# Patient Record
Sex: MALE | Race: WHITE | Employment: OTHER | URBAN - NONMETROPOLITAN AREA
[De-identification: names, ages, dates, MRNs, and addresses within clinical notes are randomized per-mention and may not be internally consistent; named-entity substitution may affect disease eponyms.]

---

## 2017-01-09 ENCOUNTER — AMBULATORY - GICH (OUTPATIENT)
Dept: SCHEDULING | Facility: OTHER | Age: 72
End: 2017-01-09

## 2017-01-09 ENCOUNTER — TRANSFERRED RECORDS (OUTPATIENT)
Dept: HEALTH INFORMATION MANAGEMENT | Facility: OTHER | Age: 72
End: 2017-01-09

## 2017-02-08 ENCOUNTER — COMMUNICATION - GICH (OUTPATIENT)
Dept: INTERNAL MEDICINE | Facility: OTHER | Age: 72
End: 2017-02-08

## 2017-02-08 DIAGNOSIS — R10.13 EPIGASTRIC PAIN: ICD-10-CM

## 2017-05-30 ENCOUNTER — COMMUNICATION - GICH (OUTPATIENT)
Dept: INTERNAL MEDICINE | Facility: OTHER | Age: 72
End: 2017-05-30

## 2017-05-30 DIAGNOSIS — R10.13 EPIGASTRIC PAIN: ICD-10-CM

## 2017-07-12 ENCOUNTER — OFFICE VISIT - GICH (OUTPATIENT)
Dept: INTERNAL MEDICINE | Facility: OTHER | Age: 72
End: 2017-07-12

## 2017-07-12 ENCOUNTER — HISTORY (OUTPATIENT)
Dept: INTERNAL MEDICINE | Facility: OTHER | Age: 72
End: 2017-07-12

## 2017-07-12 DIAGNOSIS — R73.09 OTHER ABNORMAL GLUCOSE: ICD-10-CM

## 2017-07-12 DIAGNOSIS — Z23 ENCOUNTER FOR IMMUNIZATION: ICD-10-CM

## 2017-07-12 DIAGNOSIS — E78.2 MIXED HYPERLIPIDEMIA: ICD-10-CM

## 2017-07-12 DIAGNOSIS — R73.03 PREDIABETES: ICD-10-CM

## 2017-07-12 DIAGNOSIS — Z86.59 PERSONAL HISTORY OF OTHER MENTAL AND BEHAVIORAL DISORDERS: ICD-10-CM

## 2017-07-12 LAB
CHOL/HDL RATIO - HISTORICAL: 3.72
CHOLESTEROL TOTAL: 186 MG/DL
ESTIMATED AVERAGE GLUCOSE: 105 MG/DL
HDLC SERPL-MCNC: 50 MG/DL (ref 23–92)
HEMOGLOBIN A1C MONITORING (POCT) - HISTORICAL: 5.3 % (ref 4–6.2)
LDLC SERPL CALC-MCNC: 117 MG/DL
NON-HDL CHOLESTEROL - HISTORICAL: 136 MG/DL
PATIENT STATUS - HISTORICAL: ABNORMAL
TRIGL SERPL-MCNC: 93 MG/DL

## 2017-07-12 ASSESSMENT — PATIENT HEALTH QUESTIONNAIRE - PHQ9: SUM OF ALL RESPONSES TO PHQ QUESTIONS 1-9: 0

## 2017-07-12 ASSESSMENT — ANXIETY QUESTIONNAIRES
3. WORRYING TOO MUCH ABOUT DIFFERENT THINGS: NOT AT ALL
GAD7 TOTAL SCORE: 0
6. BECOMING EASILY ANNOYED OR IRRITABLE: NOT AT ALL
2. NOT BEING ABLE TO STOP OR CONTROL WORRYING: NOT AT ALL
5. BEING SO RESTLESS THAT IT IS HARD TO SIT STILL: NOT AT ALL
7. FEELING AFRAID AS IF SOMETHING AWFUL MIGHT HAPPEN: NOT AT ALL
4. TROUBLE RELAXING: NOT AT ALL
1. FEELING NERVOUS, ANXIOUS, OR ON EDGE: NOT AT ALL

## 2017-11-27 ENCOUNTER — COMMUNICATION - GICH (OUTPATIENT)
Dept: INTERNAL MEDICINE | Facility: OTHER | Age: 72
End: 2017-11-27

## 2017-11-27 DIAGNOSIS — F41.9 ANXIETY DISORDER: ICD-10-CM

## 2017-12-28 NOTE — TELEPHONE ENCOUNTER
Patient Information     Patient Name MRN Sex Lauro Vázquez [id number] Male 1945      Telephone Encounter by Opal Rider RN at 2017  4:16 PM     Author:  Opal Rider RN Service:  (none) Author Type:  NURS- Registered Nurse     Filed:  2017  4:18 PM Encounter Date:  2017 Status:  Signed     :  Opal Rider RN (NURS- Registered Nurse)            Medication filled 2017 #90 R3  Unable to complete prescription refill per RN Medication Refill Policy.................... Opal Rider RN ....................  2017   4:17 PM

## 2017-12-28 NOTE — ADDENDUM NOTE
Patient Information     Patient Name MRN Sex Lauro Vázquez [id number] Male 1945      Addendum Note by Emmanuelle Austin at 2017  1:57 PM     Author:  Emmanuelle Austin Service:  (none) Author Type:  (none)     Filed:  2017  1:57 PM Encounter Date:  2017 Status:  Signed     :  Emmanuelle Austin       Addended by: EMMANUELLE AUSTIN on: 2017 01:57 PM        Modules accepted: Orders

## 2017-12-28 NOTE — PROGRESS NOTES
Patient Information     Patient Name MRN Sex Lauro Vázquez [id number] Male 1945      Progress Notes by Marco A Hampton MD at 2017  9:20 AM     Author:  Marco A Hampton MD Service:  (none) Author Type:  Physician     Filed:  2017 12:47 PM Encounter Date:  2017 Status:  Signed     :  Marco A Hampton MD (Physician)            Nursing Notes:   Emmanuelle Austin  2017  9:39 AM  Signed  Patient presents to the clinic for medication management.      Emmanuelle Austin LPN        2017 9:18 AM    Lauro Pritchard presents to clinic today for:   Chief Complaint    Patient presents with      Medication Management     HPI: Mr. Pritchard is a 72 y.o. male who presents today for evaluation of above.     (R73.03) Prediabetes - NEW Dx - 2015 - A1c 5.8%  (primary encounter diagnosis)  (E78.2) Mixed hyperlipidemia  (Z86.59) History of anxiety  (Z23) Need for pneumococcal vaccination  (R73.09) Elevated random blood glucose level     Prediabetes, has been really watching his diet.  He cut out a lot of sugared sweets and candies.  He like his labs rechecked today.  + Took metformin for a while, has now stopped. Last A1c was in high-normal.    -Labs today show his prediabetes has resolved.  HEMOGLOBIN A1C MONITORING (POCT) (%)    Date Value   2017 5.3     Hyperlipidemia, tolerating pravastatin 80 mg daily very well.  Denies having any significant myalgias issues.  - Needs refills today.  Last Lipids:  Chol: 2017 186   93  HDL: 2017 50   LDL: 2017 117    History of anxiety, very well controlled with low-dose Zoloft.  He was on 50 mg daily for quite a while but dropped down to 25 mg daily.  This is been managing his symptoms well.  He would like a refill today.    Pneumococcal vaccination due.    Heartburn issues are doing well. No longer using pepcid, rarely using omeprazole.     Mr. Pritchard's Body mass index is 27.12 kg/(m^2). This is out of the normal range for a 72 y.o.  Normal range for ages 18+ is between 18.5 and 24.9. To lose weight we reviewed risks and benefits of appropriate options such as diet, exercise, and medications. Patient's strategy will be  self-directed nutrition plan and self-directed exercise program   BP Readings from Last 1 Encounters:07/12/17 : 134/82  Mr. Sahni blood pressure is out of the normal range for adults. Per JNC-8 guidelines normal adult blood pressure is < 120/80, pre-hypertensive is between 120/80 and 139/89, and hypertension is 140/90 or greater. Risks of hypertension were discussed. Patient's strategy will be weight loss, increased activity and reduced salt intake    Functional Capacity: > 4 METS.   Reports that he can climb a flight of stairs without any chest pain/heaviness or shortness of breath.   Patient reports no current symptoms of fevers, chills, nausea/vomiting.   No cough. No shortness of breath.   No change in bowel/bladder habits. No melena, hematochezia. No Hematuria.   No rashes. No palpitations.  No orthopnea/paroxysmal nocturnal dyspnea   No vision or hearing issues.   No significant mood issues -- hx of severe anxiety - resolved with low-dose zoloft.   No bruising.     JOSE LUIS:  JOSE LUIS-7 ANXIETY SCREENING 7/7/2016 7/12/2017   JOSE LUIS date (doc flow) 7/7/2016 7/12/2017   Nervous, anxious 1 0   Cannot stop worrying 0 0   Worry about different things 0 0   Cannot relax 0 0   Feeling restless 0 0   Easily annoyed/irritated 0 0   Afraid of awful event 0 0   Score 1 0   Severity none none       PHQ9:  PHQ Depression Screening 12/21/2016 7/12/2017   Date of PHQ exam (doc flow) 12/21/2016 7/12/2017   1. Lack of interest/pleasure 0 - Not at all 0 - Not at all   2. Feeling down/depressed 0 - Not at all 0 - Not at all   PHQ-2 TOTAL SCORE 0 0   3. Trouble sleeping 0 - Not at all 0 - Not at all   4. Decreased energy 0 - Not at all 0 - Not at all   5. Appetite change 0 - Not at all 0 - Not at all   6. Feelings of failure 0 - Not at all 0 - Not at all    7. Trouble concentrating 0 - Not at all 0 - Not at all   8. Activity level 0 - Not at all 0 - Not at all   9. Hurting yourself 0 - Not at all 0 - Not at all   PHQ-9 TOTAL SCORE 0 0   PHQ-9 Severity Level none none   Functional Impairment not difficult at all not difficult at all        I have personally reviewed the past medical history, past surgical history, medications, allergies, family and social history as listed below, on 7/12/2017.    Patient Active Problem List      Diagnosis Date Noted     History of prediabetes 07/12/2017     Complete tear of right rotator cuff 12/21/2016     Arthritis of right acromioclavicular joint 12/21/2016     Nocturia 07/07/2016     Special screening for malignant neoplasms, colon 09/09/2015     History of colon polyps 04/22/2015     Mixed hyperlipidemia 09/17/2014     History of tobacco use 09/11/2014     Health care maintenance 09/11/2014     Dyspepsia 07/10/2013     History of anxiety 07/10/2013     Past Medical History:     Diagnosis  Date     Anxiety     lifetime      Past Surgical History:      Procedure  Laterality Date     COLONOSCOPY  2010    follow up due 2015 -- s/p polyp removal --> next in 2018       KNEE ARTHROSCOPY Right 2015     DE COLONOSCOPY REMOVE JULIO POLYP LESN SNARE  9/10/2015            Current Outpatient Prescriptions       Medication  Sig Dispense Refill     omeprazole (PRILOSEC) 20 mg Delayed-Release capsule TAKE 1 CAPSULE BY MOUTH ONCE DAILY. TAKE 30 TO 60 MINUTES PRIOR TO 1ST MEAL OF THE DAY. 90 capsule 1     pravastatin (PRAVACHOL) 80 mg tablet Take 1 tablet by mouth at bedtime. For Cholesterol and Heart Attack Risk Reduction 90 tablet 3     sertraline (ZOLOFT) 25 mg tablet Take 1 tablet by mouth once daily. 90 tablet 3     No Known Allergies  Family History       Problem   Relation Age of Onset     Osteoporosis  Mother      Genetic        Mother - Diabetes.  No FHx of CVA, No early family HX of CAD, no known cancers       Family Status     Relation   Status     Father  at age 91    heart failure      Mother  at age 88    heart failure      Sister Alive     Sister Alive     Brother Alive     Brother Alive     Brother Alive     Sister Alive    6 sibs healthy      Social History     Social History        Marital status:       Spouse name: Pamela     Number of children:  4     Years of education:  N/A     Occupational History               retired      Social History Main Topics         Smoking status:  Former Smoker      Packs/day: 0.50      Years: 20.00      Types: Cigarettes      Quit date: 7/10/1985      Smokeless tobacco:  Never Used      Alcohol use  7.0 oz/week     14 Cans of beer per week      Drug use:  No      Sexual activity:  Not on file      Other Topics   Concern      Service  Yes     [employer] x[employer] years - served on a [employer]      Seat Belt  Yes     Social History Narrative     , lives with wife, retired .     Pertinent ROS was performed and was negative as noted in HPI above.     EXAM:   Vitals:     17 0919   BP: 134/82   Pulse: 60   Weight: 90.7 kg (200 lb)   Height: 1.829 m (6')     BP Readings from Last 3 Encounters:    17 134/82   16 136/76   16 138/74     Wt Readings from Last 3 Encounters:    17 90.7 kg (200 lb)   16 93.2 kg (205 lb 6 oz)   16 92.8 kg (204 lb 8 oz)     Estimated body mass index is 27.12 kg/(m^2) as calculated from the following:    Height as of this encounter: 1.829 m (6').    Weight as of this encounter: 90.7 kg (200 lb).     EXAM:  Constitutional: Pleasant, alert, appropriate appearance for age. No acute distress  ENT: Normocephalic, Atraumatic, Thyroid without nodules or tenderness  Lymphatic Exam: Non-palpable nodes in neck, clavicular regions  Pulmonary: Lungs are clear to auscultation bilaterally, without wheezes or crackles  Cardiovascular Exam: regular rate and rhythm, brisk carotid upstroke without bruits, no pedal edema, no  murmur, click, rub or gallop appreciated  Gastrointestinal Exam: Soft, non-tender, non-distended, positive bowel sounds  Integument: No abnormal rashes, sores, or ulcerations noted  Neurologic Exam: CN 3-12 grossly intact   Musculoskeletal Exam: Moves lower extremities symmetrically, No focal weakness, some mild limited right shoulder ROM.   Gait and station appear grossly normal  Psychiatric Exam: Awake and Alert, Affect and mood appropriate  Speech is fluent, Thought process is normal    INVESTIGATIONS:  Results for orders placed or performed in visit on 07/12/17      LIPID PANEL      Result  Value Ref Range    CHOLESTEROL,TOTAL 186 <200 mg/dL    TRIGLYCERIDES 93 <150 mg/dL    HDL CHOLESTEROL 50 23 - 92 mg/dL    NON-HDL CHOLESTEROL 136 <145 mg/dl    CHOL/HDL RATIO            3.72 <4.50                    LDL CHOLESTEROL 117 (H) <100 mg/dL    PATIENT STATUS            FASTING                   Hgb A1c      Result  Value Ref Range    HEMOGLOBIN A1C MONITORING (POCT) 5.3 4.0 - 6.2 %    ESTIMATED AVERAGE GLUCOSE  105 mg/dL       ASSESSMENT AND PLAN:  Lauro was seen today for medication management.    Diagnoses and all orders for this visit:    Prediabetes - NEW Dx - 9/4/2015 - A1c 5.8%  -     pravastatin (PRAVACHOL) 80 mg tablet; Take 1 tablet by mouth at bedtime. For Cholesterol and Heart Attack Risk Reduction    Mixed hyperlipidemia  -     pravastatin (PRAVACHOL) 80 mg tablet; Take 1 tablet by mouth at bedtime. For Cholesterol and Heart Attack Risk Reduction  -     LIPID PANEL; Future  -     LIPID PANEL    History of anxiety  -     sertraline (ZOLOFT) 25 mg tablet; Take 1 tablet by mouth once daily.    Need for pneumococcal vaccination  -     OMNI PNEUMOCOCCAL VACCINE 23-VALENT => 3 YO IM  -     DE ADMIN PNEUMOCOCCAL VACC (MEDICARE)    Elevated random blood glucose level  -     Hgb A1c; Future  -     Hgb A1c      lab results and schedule of future lab studies reviewed with patient, reviewed diet, exercise and  weight control, recommended sodium restriction, cardiovascular risk and specific lipid/LDL goals reviewed    -- Expected clinical course discussed   -- Medications and their side effects discussed    The 10-year ASCVD risk score ([place] DC Jr, et al., 2013) is: 21.5%    Values used to calculate the score:      Age: 72 years      Sex: Male      Is Non- : No      Diabetic: No      Tobacco smoker: No      Systolic Blood Pressure: 134 mmHg      Is BP treated: No      HDL Cholesterol: 50 mg/dL      Total Cholesterol: 186 mg/dL    Lauro is also recommended to eat a heart-healthy diet, do regular aerobic exercises, maintain a desirable body weight, and avoid tobacco products. These recommendations are from the American Heart Association (AHA) which stresses the importance of lifestyle changes to lower cardiovascular disease risk.     Return in about 1 year (around 7/12/2018) for -- annual follow-up.    Patient Instructions     Immunization History     Administered  Date(s) Administered     Herpes-zoster Vaccine 01/20/2016     Influenza, IIV3 (Age >=3 years) 09/23/2014     Pneumococcal conj 13-Valent (Prevnar 13) 01/19/2016     Tdap 09/22/2014        Pneumococcal PCV 23 shot today.     Pneumococcal Pneumonia vaccines (PCV 13 and PCV 23)     Pneumococcal Conjugate 13 - Valent Vaccine (One time only).     Pneumococcal 23 - Valent Vaccine -- Two doses (One before age 65 and One After)    -- repeat every 5 years in certain patient populations.     PCV 13 should be given prior to PCV 23 -- THEN -- In eight weeks or more, PCV 23 can be given.   If the patient has already received PCV 23, they should not receive PCV 13 for one year.      Medications refilled.    Recheck cholesterol level and hemoglobin A1c today.    Return in approximately 1 year, or sooner as needed for follow-up with Dr. Hampton.  -- annual follow-up    Clinic : [phone]  Appointment line: [phone]      Marco A Hampton,  MD

## 2017-12-29 NOTE — PATIENT INSTRUCTIONS
Patient Information     Patient Name MRN Sex Lauro Vázquez [id number] Male 1945      Patient Instructions by Marco A Hampton MD at 2017  9:20 AM     Author:  Marco A Hampton MD  Service:  (none) Author Type:  Physician     Filed:  2017  9:44 AM  Encounter Date:  2017 Status:  Addendum     :  Marco A Hampton MD (Physician)        Related Notes: Original Note by Marco A Hampton MD (Physician) filed at 2017  9:43 AM            Immunization History     Administered  Date(s) Administered     Herpes-zoster Vaccine 2016     Influenza, IIV3 (Age >=3 years) 2014     Pneumococcal conj 13-Valent (Prevnar 13) 2016     Tdap 2014        Pneumococcal PCV 23 shot today.     Pneumococcal Pneumonia vaccines (PCV 13 and PCV 23)     Pneumococcal Conjugate 13 - Valent Vaccine (One time only).     Pneumococcal 23 - Valent Vaccine -- Two doses (One before age 65 and One After)    -- repeat every 5 years in certain patient populations.     PCV 13 should be given prior to PCV 23 -- THEN -- In eight weeks or more, PCV 23 can be given.   If the patient has already received PCV 23, they should not receive PCV 13 for one year.      Medications refilled.    Recheck cholesterol level and hemoglobin A1c today.    Return in approximately 1 year, or sooner as needed for follow-up with Dr. Hampton.  -- annual follow-up    Clinic : [phone]  Appointment line: [phone]

## 2017-12-30 NOTE — NURSING NOTE
Patient Information     Patient Name MRN Lauro Linda [id number] Male 1945      Nursing Note by Emmanuelle Austin at 2017  9:20 AM     Author:  Emmanuelle Austin Service:  (none) Author Type:  (none)     Filed:  2017  9:39 AM Encounter Date:  2017 Status:  Signed     :  Emmanuelle Austin            Patient presents to the clinic for medication management.      Emmanuelle Austin LPN        2017 9:18 AM

## 2018-01-03 NOTE — TELEPHONE ENCOUNTER
Patient Information     Patient Name MRN Sex Lauro Vázquez [id number] Male 1945      Telephone Encounter by Annette Arthur RN at 2017  1:55 PM     Author:  Annette Arthur RN Service:  (none) Author Type:  NURS- Registered Nurse     Filed:  2017  1:57 PM Encounter Date:  2017 Status:  Signed     :  Annette Arthur RN (NURS- Registered Nurse)            Proton Pump Inhibitors    Office visit in the past 12 months or per provider note.    Last visit with MICHELE PEREYRA was on: 2016 in [facility]  Next visit with MICHELE PEREYRA is on: No future appointment listed with this provider  Next visit with Internal Medicine is on: No future appointment listed in this department    Max refill for 12 months from last office visit or per provider note.  Prescription refilled per RN Medication Refill Policy.................... ANNETTE ARTHUR RN ....................  2017   1:57 PM

## 2018-01-05 NOTE — TELEPHONE ENCOUNTER
Patient Information     Patient Name MRN Sex Lauro Vázquez [id number] Male 1945      Telephone Encounter by Annette Arthur RN at 2017  9:03 AM     Author:  Annette Arthur RN Service:  (none) Author Type:  NURS- Registered Nurse     Filed:  2017  9:04 AM Encounter Date:  2017 Status:  Signed     :  Annette Arthur RN (NURS- Registered Nurse)            Proton Pump Inhibitors    Office visit in the past 12 months or per provider note.    Last visit with MICHELE PEREYRA was on: 2016 in [facility]  Next visit with MICHELE PEREYRA is on: 2017 in [facility]  Next visit with Internal Medicine is on: 2017 in [facility]    Max refill for 12 months from last office visit or per provider note.  Prescription refilled per RN Medication Refill Policy.................... ANNETTE ARTHUR RN ....................  2017   9:04 AM

## 2018-01-26 VITALS
DIASTOLIC BLOOD PRESSURE: 82 MMHG | HEIGHT: 72 IN | SYSTOLIC BLOOD PRESSURE: 134 MMHG | WEIGHT: 200 LBS | HEART RATE: 60 BPM | BODY MASS INDEX: 27.09 KG/M2

## 2018-02-02 ASSESSMENT — ANXIETY QUESTIONNAIRES: GAD7 TOTAL SCORE: 0

## 2018-02-02 ASSESSMENT — PATIENT HEALTH QUESTIONNAIRE - PHQ9: SUM OF ALL RESPONSES TO PHQ QUESTIONS 1-9: 0

## 2018-02-19 DIAGNOSIS — R10.13 DYSPEPSIA: Primary | ICD-10-CM

## 2018-02-21 ENCOUNTER — DOCUMENTATION ONLY (OUTPATIENT)
Dept: FAMILY MEDICINE | Facility: OTHER | Age: 73
End: 2018-02-21

## 2018-02-21 PROBLEM — Z87.898 HISTORY OF PREDIABETES: Status: ACTIVE | Noted: 2017-07-12

## 2018-02-21 RX ORDER — PRAVASTATIN SODIUM 80 MG/1
80 TABLET ORAL AT BEDTIME
COMMUNITY
Start: 2017-07-12 | End: 2018-08-15

## 2018-02-21 RX ORDER — SERTRALINE HYDROCHLORIDE 25 MG/1
25 TABLET, FILM COATED ORAL DAILY
COMMUNITY
Start: 2017-07-12 | End: 2018-08-15

## 2018-02-22 DIAGNOSIS — R12 HEARTBURN: Primary | ICD-10-CM

## 2018-02-22 NOTE — TELEPHONE ENCOUNTER
Prescription approved per [facility] Refill Protocol.  LOV: 7/12/17  Bre León RN on 2/22/2018 at 2:21 PM

## 2018-02-28 NOTE — TELEPHONE ENCOUNTER
Refill request for Omeprazole 20 mg from [facility].    Jovan Sandhu, AGNIESZKA 02/28/18 8:25 AM

## 2018-03-08 ENCOUNTER — DOCUMENTATION ONLY (OUTPATIENT)
Dept: FAMILY MEDICINE | Facility: OTHER | Age: 73
End: 2018-03-08

## 2018-03-25 ENCOUNTER — HEALTH MAINTENANCE LETTER (OUTPATIENT)
Age: 73
End: 2018-03-25

## 2018-07-23 NOTE — PROGRESS NOTES
Patient Information     Patient Name  Lauro Pritchard MRN  [id number] Sex  Male   1945      Letter by Marco A Hampton MD at      Author:  Marco A Hampton MD Service:  (none) Author Type:  (none)    Filed:   Encounter Date:  2017 Status:  (Other)           Lauro Pritchard  [address] [facility] [address]          2017    Dear Mr. Pritchard:    Following are the tests completed during your last clinic visit.  The results of these tests are included below.      Cholesterol levels look much better.  Recommend continuing pravastatin at the current dose.    Your prediabetes has totally resolved.  Hemoglobin A1c is now normal.    Keep working to try maintain healthy weight, healthy diet and regular exercise.     Results for orders placed or performed in visit on 17      LIPID PANEL      Result  Value Ref Range    CHOLESTEROL,TOTAL 186 <200 mg/dL    TRIGLYCERIDES 93 <150 mg/dL    HDL CHOLESTEROL 50 23 - 92 mg/dL    NON-HDL CHOLESTEROL 136 <145 mg/dl    CHOL/HDL RATIO            3.72 <4.50                    LDL CHOLESTEROL 117 (H) <100 mg/dL    PATIENT STATUS            FASTING                   Hgb A1c      Result  Value Ref Range    HEMOGLOBIN A1C MONITORING (POCT) 5.3 4.0 - 6.2 %    ESTIMATED AVERAGE GLUCOSE  105 mg/dL         If you have any further questions or problems contact my office at  [phone]   --- or send [employer] message --- otherwise schedule an appointment.    Clinic : [phone]  Appointment line: [phone]     Thank you,    Marco A Hampton MD    Internal Medicine  [facility]     Reviewed and electronically signed by provider.

## 2018-08-15 ENCOUNTER — OFFICE VISIT (OUTPATIENT)
Dept: INTERNAL MEDICINE | Facility: OTHER | Age: 73
End: 2018-08-15
Attending: INTERNAL MEDICINE
Payer: MEDICARE

## 2018-08-15 VITALS
HEART RATE: 72 BPM | WEIGHT: 203.13 LBS | DIASTOLIC BLOOD PRESSURE: 72 MMHG | BODY MASS INDEX: 27.55 KG/M2 | SYSTOLIC BLOOD PRESSURE: 118 MMHG

## 2018-08-15 DIAGNOSIS — E78.2 MIXED HYPERLIPIDEMIA: Primary | ICD-10-CM

## 2018-08-15 DIAGNOSIS — R12 HEARTBURN: ICD-10-CM

## 2018-08-15 DIAGNOSIS — G89.29 CHRONIC NECK PAIN: ICD-10-CM

## 2018-08-15 DIAGNOSIS — M54.2 CHRONIC NECK PAIN: ICD-10-CM

## 2018-08-15 DIAGNOSIS — Z12.5 SPECIAL SCREENING FOR MALIGNANT NEOPLASM OF PROSTATE: ICD-10-CM

## 2018-08-15 DIAGNOSIS — F41.9 ANXIETY: ICD-10-CM

## 2018-08-15 PROCEDURE — 99214 OFFICE O/P EST MOD 30 MIN: CPT | Performed by: INTERNAL MEDICINE

## 2018-08-15 PROCEDURE — G0463 HOSPITAL OUTPT CLINIC VISIT: HCPCS

## 2018-08-15 RX ORDER — PRAVASTATIN SODIUM 80 MG/1
80 TABLET ORAL AT BEDTIME
Qty: 90 TABLET | Refills: 3 | Status: SHIPPED | OUTPATIENT
Start: 2018-08-15 | End: 2019-05-31

## 2018-08-15 RX ORDER — SERTRALINE HYDROCHLORIDE 25 MG/1
25 TABLET, FILM COATED ORAL DAILY
Qty: 90 TABLET | Refills: 3 | Status: SHIPPED | OUTPATIENT
Start: 2018-08-15 | End: 2019-05-31

## 2018-08-15 ASSESSMENT — ENCOUNTER SYMPTOMS
CONFUSION: 0
NECK PAIN: 1
DIARRHEA: 0
SHORTNESS OF BREATH: 0
VOMITING: 0
BRUISES/BLEEDS EASILY: 0
CHILLS: 0
NAUSEA: 0
FEVER: 0
ABDOMINAL PAIN: 0
DYSURIA: 0
LIGHT-HEADEDNESS: 0
EYE PAIN: 0
FATIGUE: 0
ARTHRALGIAS: 0
WHEEZING: 0
AGITATION: 0
PALPITATIONS: 0
MYALGIAS: 0
COUGH: 0
DIZZINESS: 0
HEMATURIA: 0

## 2018-08-15 ASSESSMENT — ANXIETY QUESTIONNAIRES
5. BEING SO RESTLESS THAT IT IS HARD TO SIT STILL: NOT AT ALL
1. FEELING NERVOUS, ANXIOUS, OR ON EDGE: NOT AT ALL
7. FEELING AFRAID AS IF SOMETHING AWFUL MIGHT HAPPEN: NOT AT ALL
2. NOT BEING ABLE TO STOP OR CONTROL WORRYING: NOT AT ALL
GAD7 TOTAL SCORE: 0
3. WORRYING TOO MUCH ABOUT DIFFERENT THINGS: NOT AT ALL
6. BECOMING EASILY ANNOYED OR IRRITABLE: NOT AT ALL
IF YOU CHECKED OFF ANY PROBLEMS ON THIS QUESTIONNAIRE, HOW DIFFICULT HAVE THESE PROBLEMS MADE IT FOR YOU TO DO YOUR WORK, TAKE CARE OF THINGS AT HOME, OR GET ALONG WITH OTHER PEOPLE: NOT DIFFICULT AT ALL

## 2018-08-15 ASSESSMENT — PAIN SCALES - GENERAL: PAINLEVEL: MODERATE PAIN (5)

## 2018-08-15 ASSESSMENT — PATIENT HEALTH QUESTIONNAIRE - PHQ9: 5. POOR APPETITE OR OVEREATING: NOT AT ALL

## 2018-08-15 NOTE — NURSING NOTE
Patient presents to the clinic for medication management.    Chief Complaint   Patient presents with     Recheck Medication       Initial There were no vitals taken for this visit. Estimated body mass index is 27.12 kg/(m^2) as calculated from the following:    Height as of 7/12/17: 6' (1.829 m).    Weight as of 7/12/17: 200 lb (90.7 kg).  Medication Reconciliation: complete    Emmanuelle Austin LPN

## 2018-08-15 NOTE — MR AVS SNAPSHOT
After Visit Summary   8/15/2018    Lauro Pritchard    MRN: [id number]           Patient Information     Date Of Birth          1945        Visit Information        Provider Department      8/15/2018 8:20 AM Marco A Hampton MD [facility]        Today's Diagnoses     Mixed hyperlipidemia    -  1    Anxiety        Heartburn        Special screening for malignant neoplasm of prostate        Chronic neck pain          Care Instructions    1. Mixed hyperlipidemia  - pravastatin (PRAVACHOL) 80 MG tablet; Take 1 tablet (80 mg) by mouth At Bedtime  Dispense: 90 tablet; Refill: 3  - Lipid Panel; Future    2. Anxiety  - sertraline (ZOLOFT) 25 MG tablet; Take 1 tablet (25 mg) by mouth daily  Dispense: 90 tablet; Refill: 3    3. Heartburn  - omeprazole (PRILOSEC) 20 MG CR capsule; Take 1 capsule (20 mg) by mouth daily Take 30 to 60 minutes prior to 1st meal of the day.  Dispense: 90 capsule; Refill: 3    4. Special screening for malignant neoplasm of prostate  - PSA Screen GH; Future    5. Chronic neck pain  - XR Cervical Spine Flex/Ext 2/3 Views; Future    bilateral upper cervical pain, just lateral from midline bilaterally ? facet arthropathy.   --Depending on x-ray results, could consider facet injection.    Facet Joint Injection  Back or neck pain may be caused by a problem with your facet joints. If so, a facet joint injection may help. With this treatment, medicine is injected into certain facet joints. The injection can help your doctor find problem joints. It may also relieve your pain.    What is a facet joint?  Bones called vertebrae make up your spine. Each vertebra has facets (flat surfaces) that touch where the vertebrae fit together. These form a structure called a facet joint on each side of the vertebrae.  What is a facet joint injection?  One or more facet joints in your back or neck can become inflamed (swollen and irritated). This may cause pain. During a facet joint  injection, medicine is injected into the inflamed joints. This treatment may help reduce inflammation and relieve pain. Pain relief may last for weeks to months or longer. If the pain returns, you may need a repeat injection.    Getting ready  To get ready for your treatment, do the following:    At least a week before treatment, tell your healthcare provider what medicines you take. This includes aspirin. Ask whether you should stop taking any of them before treatment.    Tell your provider if you are pregnant or allergic to any medicines.    Follow any directions you are given for not eating or drinking before the treatment.    If asked, bring X-rays, MRIs, or other tests with you on the day of your treatment.  Date Last Reviewed: 9/21/2015 2000-2017 [employer]. [address] [place] [address], [place] [address]. All rights reserved. This information is not intended as a substitute for professional medical care. Always follow your healthcare professional's instructions.      Return in approximately 1 year, or sooner as needed for follow-up with Dr. Hampton.  - Annual follow-up    Clinic : [phone]  Appointment line: [phone]            Follow-ups after your visit        Follow-up notes from your care team     Return in about 1 year (around 8/15/2019) for - Annual follow-up.      Future tests that were ordered for you today     Open Future Orders        Priority Expected Expires Ordered    XR Cervical Spine Flex/Ext 2/3 Views Routine 8/15/2018 8/15/2019 8/15/2018    Lipid Panel Routine  8/15/2019 8/15/2018    PSA Screen GH Routine  8/15/2019 8/15/2018            Who to contact     If you have questions or need follow up information about today's clinic visit or your schedule please contact [facility] directly at [phone].  Normal or non-critical lab and imaging results will be communicated to you by MyChart, letter or phone within 4 business days after the clinic  has received the results. If you do not hear from us within 7 days, please contact the clinic through [employer] or phone. If you have a critical or abnormal lab result, we will notify you by phone as soon as possible.  Submit refill requests through [employer] or call your pharmacy and they will forward the refill request to us. Please allow 3 business days for your refill to be completed.          Additional Information About Your Visit        [employer] Information     [employer] gives you secure access to your electronic health record. If you see a primary care provider, you can also send messages to your care team and make appointments. If you have questions, please call your primary care clinic.  If you do not have a primary care provider, please call [phone] and they will assist you.        Care EveryWhere ID     This is your Care EveryWhere ID. This could be used by other organizations to access your [facility] medical records  [id number]        Your Vitals Were     Pulse BMI (Body Mass Index)                72 27.55 kg/m2           Blood Pressure from Last 3 Encounters:   08/15/18 118/72   07/12/17 134/82   12/21/16 136/76    Weight from Last 3 Encounters:   08/15/18 203 lb 2 oz (92.1 kg)   07/12/17 200 lb (90.7 kg)   12/21/16 205 lb 6 oz (93.2 kg)                 Where to get your medicines      These medications were sent to [id number] - [place], [place] - [address]  [address], [facility] [address]     Phone:  [phone]     omeprazole 20 MG CR capsule    pravastatin 80 MG tablet    sertraline 25 MG tablet          Primary Care Provider Office Phone # Fax #    Marco A Hampton [phone] [address]  [facility] [address]        Equal Access to Services     ORA JAY : Margarita Steward, anibal garcia, qastacia barone. So [facility] [phone].    ATENCIÓN: Si  jenny guerra, tiene a nava disposición servicios gratuitos de asistencia lingüística. Sal maier [phone].    We comply with applicable federal civil rights laws and [place] laws. We do not discriminate on the basis of race, color, national origin, age, disability, sex, sexual orientation, or gender identity.            Thank you!     Thank you for choosing [facility]  for your care. Our goal is always to provide you with excellent care. Hearing back from our patients is one way we can continue to improve our services. Please take a few minutes to complete the written survey that you may receive in the mail after your visit with us. Thank you!             Your Updated Medication List - Protect others around you: Learn how to safely use, store and throw away your medicines at www.disposemymeds.org.          This list is accurate as of 8/15/18  8:51 AM.  Always use your most recent med list.                   Brand Name Dispense Instructions for use Diagnosis    omeprazole 20 MG CR capsule    priLOSEC    90 capsule    Take 1 capsule (20 mg) by mouth daily Take 30 to 60 minutes prior to 1st meal of the day.    Heartburn       pravastatin 80 MG tablet    PRAVACHOL    90 tablet    Take 1 tablet (80 mg) by mouth At Bedtime    Mixed hyperlipidemia       sertraline 25 MG tablet    ZOLOFT    90 tablet    Take 1 tablet (25 mg) by mouth daily    Anxiety

## 2018-08-15 NOTE — PATIENT INSTRUCTIONS
1. Mixed hyperlipidemia  - pravastatin (PRAVACHOL) 80 MG tablet; Take 1 tablet (80 mg) by mouth At Bedtime  Dispense: 90 tablet; Refill: 3  - Lipid Panel; Future    2. Anxiety  - sertraline (ZOLOFT) 25 MG tablet; Take 1 tablet (25 mg) by mouth daily  Dispense: 90 tablet; Refill: 3    3. Heartburn  - omeprazole (PRILOSEC) 20 MG CR capsule; Take 1 capsule (20 mg) by mouth daily Take 30 to 60 minutes prior to 1st meal of the day.  Dispense: 90 capsule; Refill: 3    4. Special screening for malignant neoplasm of prostate  - PSA Screen GH; Future    5. Chronic neck pain  - XR Cervical Spine Flex/Ext 2/3 Views; Future    bilateral upper cervical pain, just lateral from midline bilaterally ? facet arthropathy.   --Depending on x-ray results, could consider facet injection.    Facet Joint Injection  Back or neck pain may be caused by a problem with your facet joints. If so, a facet joint injection may help. With this treatment, medicine is injected into certain facet joints. The injection can help your doctor find problem joints. It may also relieve your pain.    What is a facet joint?  Bones called vertebrae make up your spine. Each vertebra has facets (flat surfaces) that touch where the vertebrae fit together. These form a structure called a facet joint on each side of the vertebrae.  What is a facet joint injection?  One or more facet joints in your back or neck can become inflamed (swollen and irritated). This may cause pain. During a facet joint injection, medicine is injected into the inflamed joints. This treatment may help reduce inflammation and relieve pain. Pain relief may last for weeks to months or longer. If the pain returns, you may need a repeat injection.    Getting ready  To get ready for your treatment, do the following:    At least a week before treatment, tell your healthcare provider what medicines you take. This includes aspirin. Ask whether you should stop taking any of them before  treatment.    Tell your provider if you are pregnant or allergic to any medicines.    Follow any directions you are given for not eating or drinking before the treatment.    If asked, bring X-rays, MRIs, or other tests with you on the day of your treatment.  Date Last Reviewed: 9/21/2015 2000-2017 [employer] [address] [place] [address]. All rights reserved. This information is not intended as a substitute for professional medical care. Always follow your healthcare professional's instructions.      Return in approximately 1 year, or sooner as needed for follow-up with Dr. Hampton.  - Annual follow-up    Clinic : [phone]  Appointment line: [phone]

## 2018-08-15 NOTE — PROGRESS NOTES
Nursing Notes:   Emmanuelle Austin LPN  8/15/2018  8:37 AM  Signed  Patient presents to the clinic for medication management.    Chief Complaint   Patient presents with     Recheck Medication       Initial There were no vitals taken for this visit. Estimated body mass index is 27.12 kg/(m^2) as calculated from the following:    Height as of 7/12/17: 6' (1.829 m).    Weight as of 7/12/17: 200 lb (90.7 kg).  Medication Reconciliation: complete    Emmanuelle Austin LPN    Nursing note reviewed with patient.  Accurracy and completeness verified.   Mr. Pritchard is a 73 year old male who:  Patient presents with:  Recheck Medication    HPI     ICD-10-CM    1. Mixed hyperlipidemia E78.2 pravastatin (PRAVACHOL) 80 MG tablet     Lipid Panel   2. Anxiety F41.9 sertraline (ZOLOFT) 25 MG tablet   3. Heartburn R12 omeprazole (PRILOSEC) 20 MG CR capsule   4. Special screening for malignant neoplasm of prostate Z12.5 PSA Screen GH   5. Chronic neck pain M54.2 XR Cervical Spine Flex/Ext 2/3 Views    G89.29      Patient presents for yearly follow-up.  Tolerating pravastatin well.  Needs refills.  Check labs.    Anxiety, chronic, doing well with Zoloft.  Would like to continue with same dose.  Refills today.    Heartburn, much improved.  Now rarely uses omeprazole.  He needs refills today.  Reports was drinking beer regularly which he has now discontinued and his heartburn significantly reduced.    History of nocturia, most recently does not have any more of these issues.  He would like PSA screening today for prostate cancer.    Chronic neck pain, mostly upper neck.  Neck rotation and even sometimes flexion-extension causes some pain just lateral from midline in the upper neck.  Has had issues with this on and off for years.  Does not radiate down the arms at all.  No hand weakness or numbness or tingling.  Localized pain.  Pain is somewhat sharp with movement or activity.  Mild to moderate in severity.  Rarely needs any medication for  this.  Check neck x-rays today.  Consider facet joint injection if noted abnormal on x-ray.  Patient will let us know when symptoms worsen enough to proceed with any further investigations.    Functional Capacity: > 4 METS.   Reports that he can climb a flight of stairs without any chest pain/heaviness or shortness of breath.   No orthopnea/paroxysmal nocturnal dyspnea  Review of Systems   Constitutional: Negative for chills, fatigue and fever.   HENT: Negative for congestion and hearing loss.    Eyes: Negative for pain and visual disturbance.   Respiratory: Negative for cough, shortness of breath and wheezing.    Cardiovascular: Negative for chest pain and palpitations.   Gastrointestinal: Negative for abdominal pain, diarrhea, nausea and vomiting.   Endocrine: Negative for cold intolerance and heat intolerance.   Genitourinary: Negative for dysuria and hematuria.   Musculoskeletal: Positive for neck pain. Negative for arthralgias and myalgias.   Skin: Negative for pallor.   Allergic/Immunologic: Negative for immunocompromised state.   Neurological: Negative for dizziness and light-headedness.   Hematological: Does not bruise/bleed easily.   Psychiatric/Behavioral: Negative for agitation and confusion.      JOSE LUIS:   JOSE LUIS-7 SCORE 7/7/2016 7/12/2017 8/15/2018   Total Score 1 0 0     PHQ9:  PHQ-9 SCORE 12/21/2016 7/12/2017 8/15/2018   Total Score 0 0 0       I have personally reviewed the past medical history, past surgical history, medications, allergies, family and social history as listed below, on 8/15/2018.    No Known Allergies    Current Outpatient Prescriptions   Medication Sig Dispense Refill     omeprazole (PRILOSEC) 20 MG CR capsule Take 1 capsule (20 mg) by mouth daily Take 30 to 60 minutes prior to 1st meal of the day. 90 capsule 3     pravastatin (PRAVACHOL) 80 MG tablet Take 1 tablet (80 mg) by mouth At Bedtime 90 tablet 3     sertraline (ZOLOFT) 25 MG tablet Take 1 tablet (25 mg) by mouth daily 90 tablet  3     [DISCONTINUED] pravastatin (PRAVACHOL) 80 MG tablet Take 80 mg by mouth At Bedtime       [DISCONTINUED] sertraline (ZOLOFT) 25 MG tablet Take 25 mg by mouth daily          Patient Active Problem List    Diagnosis Date Noted     Chronic neck pain 08/15/2018     Priority: Medium     History of prediabetes 07/12/2017     Priority: Medium     Arthritis of right acromioclavicular joint 12/21/2016     Priority: Medium     Complete tear of right rotator cuff 12/21/2016     Priority: Medium     Special screening for malignant neoplasms, colon 09/09/2015     Priority: Medium     History of colon polyps 04/22/2015     Priority: Medium     Mixed hyperlipidemia 09/17/2014     Priority: Medium     Health care maintenance 09/11/2014     Priority: Medium     History of tobacco use 09/11/2014     Priority: Medium     History of anxiety 07/10/2013     Priority: Medium     Past Medical History:   Diagnosis Date     Anxiety disorder     lifetime     Past Surgical History:   Procedure Laterality Date     ARTHROSCOPY KNEE      2015     COLONOSCOPY      2010,follow up due 2015 -- s/p polyp removal --> next in 2018     OTHER SURGICAL HISTORY      9/10/2015,29771.0,DC COLONOSCOPY REMOVE JULIO POLYP LESN JANETTE     Social History     Social History     Marital status:      Spouse name: Pamela     Number of children: N/A     Years of education: N/A     Social History Main Topics     Smoking status: Former Smoker     Packs/day: 0.50     Years: 20.00     Types: Cigarettes     Quit date: 7/10/1985     Smokeless tobacco: Never Used     Alcohol use 7.0 oz/week     Drug use: None      Comment: Drug use: No     Sexual activity: Not Asked     Other Topics Concern     None     Social History Narrative    , lives with wife, retired .     Family History   Problem Relation Age of Onset     Osteoperosis Mother      Osteoporosis     Genetic Disorder Other      Genetic,Mother - Diabetes.  No FHx of CVA, No early family HX of CAD,  no known cancers       EXAM:   Vitals:    08/15/18 0830   BP: 118/72   BP Location: Right arm   Patient Position: Sitting   Cuff Size: Adult Regular   Pulse: 72   Weight: 203 lb 2 oz (92.1 kg)       Current Pain Score: Moderate Pain (5)     BP Readings from Last 3 Encounters:   08/15/18 118/72   07/12/17 134/82   12/21/16 136/76    Wt Readings from Last 3 Encounters:   08/15/18 203 lb 2 oz (92.1 kg)   07/12/17 200 lb (90.7 kg)   12/21/16 205 lb 6 oz (93.2 kg)      Estimated body mass index is 27.55 kg/(m^2) as calculated from the following:    Height as of 7/12/17: 6' (1.829 m).    Weight as of this encounter: 203 lb 2 oz (92.1 kg).     Physical Exam   Constitutional: He appears well-developed and well-nourished. No distress.   HENT:   Head: Normocephalic and atraumatic.   Eyes: Conjunctivae are normal. No scleral icterus.   Neck: No thyromegaly present.   Cardiovascular: Normal rate and regular rhythm.    No carotid Bruits   Pulmonary/Chest: Effort normal. No respiratory distress. He has no wheezes.   Abdominal: Soft. There is no tenderness.   Musculoskeletal: He exhibits no deformity.   Mild tenderness just lateral from midline in the upper cervical spine.  More focal tenderness to palpation over the facet joints.  Minimal paraspinal muscle spasticity.  No step-offs.  No spinous process tenderness.   Lymphadenopathy:     He has no cervical adenopathy.   Neurological: He is alert. No cranial nerve deficit.   Skin: Skin is warm and dry.   Psychiatric: He has a normal mood and affect.        INVESTIGATIONS:  Results for orders placed or performed in visit on 07/12/17   Lipid Profile   Result Value Ref Range    HDL Cholesterol 50 23 - 92 mg/dL    Cholesterol Total 186 <200 mg/dL    Non-HDL Cholesterol - Historical 136 <145 mg/dL    LDL Cholesterol Calculated 117 (H) <100 mg/dL    Triglycerides 93 <150 mg/dL    Cholesterol/HDL Ratio - Historical 3.72 <4.50    Patient Status - Historical FASTING    Hemoglobin A1c POCT    Result Value Ref Range    Hemoglobin A1C Monitoring - Historical 5.3 4.0 - 6.2 %    Estimated Average Glucose 105 mg/dL    Narrative         (<=6.9%)           Indicates good control       (7.0% to 7.9%)     Indicates fair control       (>=8.0%)           Indicates poor control     NOTE:  These thresholds are guidelines and            individual targets may vary.       ASSESSMENT AND PLAN:  Problem List Items Addressed This Visit        Nervous and Auditory    Chronic neck pain    Relevant Orders    XR Cervical Spine Flex/Ext 2/3 Views       Endocrine    Mixed hyperlipidemia - Primary    Relevant Medications    pravastatin (PRAVACHOL) 80 MG tablet    Other Relevant Orders    Lipid Panel      Other Visit Diagnoses     Anxiety        Relevant Medications    sertraline (ZOLOFT) 25 MG tablet    Heartburn        Relevant Medications    omeprazole (PRILOSEC) 20 MG CR capsule    Special screening for malignant neoplasm of prostate        Relevant Orders    PSA Screen GH        reviewed diet, exercise and weight control, recommended sodium restriction, cardiovascular risk and specific lipid/LDL goals reviewed  -- Expected clinical course discussed    -- Medications and their side effects discussed    The 10-year ASCVD risk score (Chris VILLA Jr, et al., 2013) is: 18.8%    Values used to calculate the score:      Age: 73 years      Sex: Male      Is Non- : No      Diabetic: No      Tobacco smoker: No      Systolic Blood Pressure: 118 mmHg      Is BP treated: No      HDL Cholesterol: 50 mg/dL      Total Cholesterol: 186 mg/dL    Patient Instructions   1. Mixed hyperlipidemia  - pravastatin (PRAVACHOL) 80 MG tablet; Take 1 tablet (80 mg) by mouth At Bedtime  Dispense: 90 tablet; Refill: 3  - Lipid Panel; Future    2. Anxiety  - sertraline (ZOLOFT) 25 MG tablet; Take 1 tablet (25 mg) by mouth daily  Dispense: 90 tablet; Refill: 3    3. Heartburn  - omeprazole (PRILOSEC) 20 MG CR capsule; Take 1 capsule (20  mg) by mouth daily Take 30 to 60 minutes prior to 1st meal of the day.  Dispense: 90 capsule; Refill: 3    4. Special screening for malignant neoplasm of prostate  - PSA Screen GH; Future    5. Chronic neck pain  - XR Cervical Spine Flex/Ext 2/3 Views; Future    bilateral upper cervical pain, just lateral from midline bilaterally ? facet arthropathy.   --Depending on x-ray results, could consider facet injection.    Facet Joint Injection  Back or neck pain may be caused by a problem with your facet joints. If so, a facet joint injection may help. With this treatment, medicine is injected into certain facet joints. The injection can help your doctor find problem joints. It may also relieve your pain.    What is a facet joint?  Bones called vertebrae make up your spine. Each vertebra has facets (flat surfaces) that touch where the vertebrae fit together. These form a structure called a facet joint on each side of the vertebrae.  What is a facet joint injection?  One or more facet joints in your back or neck can become inflamed (swollen and irritated). This may cause pain. During a facet joint injection, medicine is injected into the inflamed joints. This treatment may help reduce inflammation and relieve pain. Pain relief may last for weeks to months or longer. If the pain returns, you may need a repeat injection.    Getting ready  To get ready for your treatment, do the following:    At least a week before treatment, tell your healthcare provider what medicines you take. This includes aspirin. Ask whether you should stop taking any of them before treatment.    Tell your provider if you are pregnant or allergic to any medicines.    Follow any directions you are given for not eating or drinking before the treatment.    If asked, bring X-rays, MRIs, or other tests with you on the day of your treatment.  Date Last Reviewed: 9/21/2015 2000-2017 [employer]. [address] [place] [address], [place] [address]. All  rights reserved. This information is not intended as a substitute for professional medical care. Always follow your healthcare professional's instructions.      Return in approximately 1 year, or sooner as needed for follow-up with Dr. Hampton.  - Annual follow-up    Clinic : [phone]  Appointment line: [phone]      Marco A Hampton MD  Internal Medicine  [facility]

## 2018-08-16 ASSESSMENT — PATIENT HEALTH QUESTIONNAIRE - PHQ9: SUM OF ALL RESPONSES TO PHQ QUESTIONS 1-9: 0

## 2018-08-16 ASSESSMENT — ANXIETY QUESTIONNAIRES: GAD7 TOTAL SCORE: 0

## 2018-08-20 ENCOUNTER — HOSPITAL ENCOUNTER (OUTPATIENT)
Dept: GENERAL RADIOLOGY | Facility: OTHER | Age: 73
Discharge: HOME | End: 2018-08-20
Attending: INTERNAL MEDICINE | Admitting: INTERNAL MEDICINE
Payer: MEDICARE

## 2018-08-20 ENCOUNTER — MYC MEDICAL ADVICE (OUTPATIENT)
Dept: INTERNAL MEDICINE | Facility: OTHER | Age: 73
End: 2018-08-20

## 2018-08-20 DIAGNOSIS — M54.2 CHRONIC NECK PAIN: ICD-10-CM

## 2018-08-20 DIAGNOSIS — G89.29 CHRONIC NECK PAIN: ICD-10-CM

## 2018-08-20 PROCEDURE — 72040 X-RAY EXAM NECK SPINE 2-3 VW: CPT

## 2018-08-23 ENCOUNTER — MYC MEDICAL ADVICE (OUTPATIENT)
Dept: INTERNAL MEDICINE | Facility: OTHER | Age: 73
End: 2018-08-23

## 2018-08-23 DIAGNOSIS — G89.29 CHRONIC NECK PAIN: Primary | ICD-10-CM

## 2018-08-23 DIAGNOSIS — M54.2 CHRONIC NECK PAIN: Primary | ICD-10-CM

## 2018-08-23 NOTE — TELEPHONE ENCOUNTER
Dear Lauro Pritchard,     The result of your recent tests are included below:     Arthritic changes noted.         Results for orders placed or performed during the hospital encounter of 08/20/18   XR Cervical Spine Flex/Ext 2/3 Views     Narrative     XR CERVICAL SPINE FLEX/EXT 2/3 VW     HISTORY: bilateral upper cervical pain, just lateral from midline  bilaterally ? facet arthropathy; Chronic neck pain; Chronic neck pain  .     TECHNIQUE: Flexion and extension views of the cervical spine.     COMPARISON: None.     FINDINGS:     Assessment is limited inferior to C6. No acute fracture. The  craniocervical junction is intact. Range of motion with flexion is  slightly limited. Degenerative changes are present diffusely,  relatively pronounced at the atlantodens interval and at C2-3.      Impression     IMPRESSION:      Diffuse degenerative changes. No evidence of abnormal translation.       CALOS SANDERS MD

## 2018-08-23 NOTE — TELEPHONE ENCOUNTER
Noted.  Would probably start with epidural cervical injection and see how much improvement was noted.  May end up needing an MRI of different injections are needed.    We can order this if he would like to proceed.    Marco A Hampton MD

## 2018-08-27 DIAGNOSIS — Z86.0101 H/O ADENOMATOUS POLYP OF COLON: Primary | ICD-10-CM

## 2018-08-27 NOTE — TELEPHONE ENCOUNTER
Screening Questions for the Scheduling of Screening Colonoscopies   (If Colonoscopy is diagnostic, Provider should review the chart before scheduling.)  Are you younger than 50 or older than 80?   NO   Do you take aspirin or fish oil?  NO  (if yes, tell patient to stop 1 week prior to Colonoscopy)  Do you take warfarin (Coumadin), clopidogrel (Plavix), apixaban (Eliquis), dabigatram (Pradaxa), rivaroxaban (Xarelto) or any blood thinner?  NO   Do you use oxygen at home?   NO   Do you have kidney disease?   NO   Are you on dialysis?   NO   Have you had a stroke or heart attack in the last year?   NO   Have you had a stent in your heart or any blood vessel in the last year?  NO   Have you had a transplant of any organ?  NO   Have you had a colonoscopy or upper endoscopy (EGD) before?  YES           When?   2015   -  [facility]     Date of scheduled Colonoscopy.  09/17/2018  [facility]

## 2018-08-29 RX ORDER — BISACODYL 5 MG
TABLET, DELAYED RELEASE (ENTERIC COATED) ORAL
Qty: 2 TABLET | Refills: 0 | Status: SHIPPED | OUTPATIENT
Start: 2018-08-29 | End: 2018-11-28

## 2018-08-29 RX ORDER — POLYETHYLENE GLYCOL 3350, SODIUM CHLORIDE, SODIUM BICARBONATE, POTASSIUM CHLORIDE 420; 11.2; 5.72; 1.48 G/4L; G/4L; G/4L; G/4L
4000 POWDER, FOR SOLUTION ORAL ONCE
Qty: 4000 ML | Refills: 0 | Status: SHIPPED | OUTPATIENT
Start: 2018-08-29 | End: 2018-08-29

## 2018-08-30 ENCOUNTER — MYC MEDICAL ADVICE (OUTPATIENT)
Dept: INTERNAL MEDICINE | Facility: OTHER | Age: 73
End: 2018-08-30

## 2018-08-30 DIAGNOSIS — M54.2 CHRONIC NECK PAIN: Primary | ICD-10-CM

## 2018-08-30 DIAGNOSIS — M62.830 MUSCLE SPASM OF BACK: ICD-10-CM

## 2018-08-30 DIAGNOSIS — G89.29 CHRONIC NECK PAIN: Primary | ICD-10-CM

## 2018-08-30 DIAGNOSIS — M19.90 ARTHRITIS: ICD-10-CM

## 2018-09-05 ENCOUNTER — MYC MEDICAL ADVICE (OUTPATIENT)
Dept: INTERNAL MEDICINE | Facility: OTHER | Age: 73
End: 2018-09-05

## 2018-09-06 ENCOUNTER — TELEPHONE (OUTPATIENT)
Dept: INTERNAL MEDICINE | Facility: OTHER | Age: 73
End: 2018-09-06

## 2018-09-06 DIAGNOSIS — M54.2 CHRONIC NECK PAIN: Primary | ICD-10-CM

## 2018-09-06 DIAGNOSIS — G89.29 CHRONIC NECK PAIN: Primary | ICD-10-CM

## 2018-09-06 NOTE — TELEPHONE ENCOUNTER
Patient needs an MRI of the neck in order to get future injections.      Emmanuelle Austin LPN 9/6/2018 4:35 PM

## 2018-09-07 ENCOUNTER — MYC MEDICAL ADVICE (OUTPATIENT)
Dept: INTERNAL MEDICINE | Facility: OTHER | Age: 73
End: 2018-09-07

## 2018-09-07 DIAGNOSIS — F40.240 CLAUSTROPHOBIA: Primary | ICD-10-CM

## 2018-09-07 RX ORDER — ALPRAZOLAM 1 MG
1 TABLET ORAL ONCE
Qty: 1 TABLET | Refills: 0 | Status: SHIPPED | OUTPATIENT
Start: 2018-09-07 | End: 2018-09-07

## 2018-09-07 NOTE — TELEPHONE ENCOUNTER
1. Claustrophobia  START:   - ALPRAZolam (XANAX) 1 MG tablet; Take 1 tablet (1 mg) by mouth once for 1 dose  Dispense: 1 tablet; Refill: 0    Marco A Hampton MD

## 2018-09-07 NOTE — TELEPHONE ENCOUNTER
Notified patient Marco A Hampton MD has wrote a prescription for anxiety medication and this will be faxed to [facility] pharmacy.  He will call with any questions or concerns.    Janice Lopez LPN............9/7/2018 1:55 PM

## 2018-09-14 ENCOUNTER — HOSPITAL ENCOUNTER (OUTPATIENT)
Dept: MRI IMAGING | Facility: OTHER | Age: 73
Discharge: HOME | End: 2018-09-14
Attending: INTERNAL MEDICINE | Admitting: INTERNAL MEDICINE
Payer: MEDICARE

## 2018-09-14 ENCOUNTER — HOSPITAL ENCOUNTER (OUTPATIENT)
Dept: GENERAL RADIOLOGY | Facility: OTHER | Age: 73
End: 2018-09-14
Attending: INTERNAL MEDICINE
Payer: MEDICARE

## 2018-09-14 DIAGNOSIS — M54.2 CHRONIC NECK PAIN: ICD-10-CM

## 2018-09-14 DIAGNOSIS — G89.29 CHRONIC NECK PAIN: ICD-10-CM

## 2018-09-14 PROCEDURE — 25000125 ZZHC RX 250: Performed by: RADIOLOGY

## 2018-09-14 PROCEDURE — 72141 MRI NECK SPINE W/O DYE: CPT

## 2018-09-14 PROCEDURE — 62321 NJX INTERLAMINAR CRV/THRC: CPT

## 2018-09-14 PROCEDURE — 25500064 ZZH RX 255 OP 636: Performed by: RADIOLOGY

## 2018-09-14 PROCEDURE — 25000128 H RX IP 250 OP 636: Performed by: RADIOLOGY

## 2018-09-14 RX ORDER — LIDOCAINE HYDROCHLORIDE 10 MG/ML
10 INJECTION, SOLUTION INFILTRATION; PERINEURAL ONCE
Status: COMPLETED | OUTPATIENT
Start: 2018-09-14 | End: 2018-09-14

## 2018-09-14 RX ORDER — BETAMETHASONE SODIUM PHOSPHATE AND BETAMETHASONE ACETATE 3; 3 MG/ML; MG/ML
12 INJECTION, SUSPENSION INTRA-ARTICULAR; INTRALESIONAL; INTRAMUSCULAR; SOFT TISSUE ONCE
Status: COMPLETED | OUTPATIENT
Start: 2018-09-14 | End: 2018-09-14

## 2018-09-14 RX ADMIN — BETAMETHASONE SODIUM PHOSPHATE AND BETAMETHASONE ACETATE 12 MG: 3; 3 INJECTION, SUSPENSION INTRA-ARTICULAR; INTRALESIONAL; INTRAMUSCULAR at 11:29

## 2018-09-14 RX ADMIN — IOHEXOL 2 ML: 240 INJECTION, SOLUTION INTRATHECAL; INTRAVASCULAR; INTRAVENOUS; ORAL at 11:28

## 2018-09-14 RX ADMIN — LIDOCAINE HYDROCHLORIDE 2 ML: 10 INJECTION, SOLUTION INFILTRATION; PERINEURAL at 11:29

## 2018-09-17 ENCOUNTER — SURGERY (OUTPATIENT)
Age: 73
End: 2018-09-17

## 2018-09-17 ENCOUNTER — HOSPITAL ENCOUNTER (OUTPATIENT)
Facility: OTHER | Age: 73
Discharge: HOME | End: 2018-09-17
Attending: SURGERY | Admitting: SURGERY
Payer: MEDICARE

## 2018-09-17 ENCOUNTER — ANESTHESIA (OUTPATIENT)
Dept: SURGERY | Facility: OTHER | Age: 73
End: 2018-09-17
Payer: MEDICARE

## 2018-09-17 ENCOUNTER — ANESTHESIA EVENT (OUTPATIENT)
Dept: SURGERY | Facility: OTHER | Age: 73
End: 2018-09-17
Payer: MEDICARE

## 2018-09-17 VITALS
DIASTOLIC BLOOD PRESSURE: 91 MMHG | OXYGEN SATURATION: 96 % | RESPIRATION RATE: 14 BRPM | WEIGHT: 193.8 LBS | TEMPERATURE: 98.2 F | SYSTOLIC BLOOD PRESSURE: 133 MMHG | HEIGHT: 72 IN | BODY MASS INDEX: 26.25 KG/M2

## 2018-09-17 PROBLEM — K63.5 COLON POLYPS: Status: ACTIVE | Noted: 2018-09-17

## 2018-09-17 PROBLEM — K57.30 SIGMOID DIVERTICULOSIS: Status: ACTIVE | Noted: 2018-09-17

## 2018-09-17 PROCEDURE — 45380 COLONOSCOPY AND BIOPSY: CPT | Performed by: NURSE ANESTHETIST, CERTIFIED REGISTERED

## 2018-09-17 PROCEDURE — 40000010 ZZH STATISTIC ANES STAT CODE-CRNA PER MINUTE: Performed by: SURGERY

## 2018-09-17 PROCEDURE — 25000125 ZZHC RX 250: Performed by: NURSE ANESTHETIST, CERTIFIED REGISTERED

## 2018-09-17 PROCEDURE — 88305 TISSUE EXAM BY PATHOLOGIST: CPT

## 2018-09-17 PROCEDURE — 25000128 H RX IP 250 OP 636: Performed by: NURSE ANESTHETIST, CERTIFIED REGISTERED

## 2018-09-17 PROCEDURE — 99100 ANES PT EXTEME AGE<1 YR&>70: CPT | Performed by: NURSE ANESTHETIST, CERTIFIED REGISTERED

## 2018-09-17 PROCEDURE — 25000125 ZZHC RX 250: Performed by: SURGERY

## 2018-09-17 PROCEDURE — 45380 COLONOSCOPY AND BIOPSY: CPT | Mod: PT | Performed by: SURGERY

## 2018-09-17 PROCEDURE — 25000132 ZZH RX MED GY IP 250 OP 250 PS 637: Mod: GY | Performed by: SURGERY

## 2018-09-17 PROCEDURE — 25000128 H RX IP 250 OP 636: Performed by: SURGERY

## 2018-09-17 RX ORDER — PROPOFOL 10 MG/ML
INJECTION, EMULSION INTRAVENOUS CONTINUOUS PRN
Status: DISCONTINUED | OUTPATIENT
Start: 2018-09-17 | End: 2018-09-17

## 2018-09-17 RX ORDER — LIDOCAINE HYDROCHLORIDE 20 MG/ML
INJECTION, SOLUTION INFILTRATION; PERINEURAL PRN
Status: DISCONTINUED | OUTPATIENT
Start: 2018-09-17 | End: 2018-09-17

## 2018-09-17 RX ORDER — NALOXONE HYDROCHLORIDE 0.4 MG/ML
.1-.4 INJECTION, SOLUTION INTRAMUSCULAR; INTRAVENOUS; SUBCUTANEOUS
Status: DISCONTINUED | OUTPATIENT
Start: 2018-09-17 | End: 2018-09-17 | Stop reason: HOSPADM

## 2018-09-17 RX ORDER — PROPOFOL 10 MG/ML
INJECTION, EMULSION INTRAVENOUS PRN
Status: DISCONTINUED | OUTPATIENT
Start: 2018-09-17 | End: 2018-09-17

## 2018-09-17 RX ORDER — SODIUM CHLORIDE, SODIUM LACTATE, POTASSIUM CHLORIDE, CALCIUM CHLORIDE 600; 310; 30; 20 MG/100ML; MG/100ML; MG/100ML; MG/100ML
INJECTION, SOLUTION INTRAVENOUS CONTINUOUS
Status: DISCONTINUED | OUTPATIENT
Start: 2018-09-17 | End: 2018-09-17 | Stop reason: HOSPADM

## 2018-09-17 RX ORDER — FLUMAZENIL 0.1 MG/ML
0.2 INJECTION, SOLUTION INTRAVENOUS
Status: DISCONTINUED | OUTPATIENT
Start: 2018-09-17 | End: 2018-09-17 | Stop reason: HOSPADM

## 2018-09-17 RX ORDER — SIMETHICONE
LIQUID (ML) MISCELLANEOUS PRN
Status: DISCONTINUED | OUTPATIENT
Start: 2018-09-17 | End: 2018-09-17 | Stop reason: HOSPADM

## 2018-09-17 RX ORDER — LIDOCAINE 40 MG/G
CREAM TOPICAL
Status: DISCONTINUED | OUTPATIENT
Start: 2018-09-17 | End: 2018-09-17 | Stop reason: HOSPADM

## 2018-09-17 RX ADMIN — WATER 200 ML: 1 IRRIGANT IRRIGATION at 10:42

## 2018-09-17 RX ADMIN — PROPOFOL 80 MG: 10 INJECTION, EMULSION INTRAVENOUS at 10:34

## 2018-09-17 RX ADMIN — LIDOCAINE HYDROCHLORIDE 60 MG: 20 INJECTION, SOLUTION INFILTRATION; PERINEURAL at 10:34

## 2018-09-17 RX ADMIN — SODIUM CHLORIDE, SODIUM LACTATE, POTASSIUM CHLORIDE, AND CALCIUM CHLORIDE: 600; 310; 30; 20 INJECTION, SOLUTION INTRAVENOUS at 10:31

## 2018-09-17 RX ADMIN — Medication 1.5 ML: at 10:42

## 2018-09-17 RX ADMIN — SODIUM CHLORIDE, SODIUM LACTATE, POTASSIUM CHLORIDE, AND CALCIUM CHLORIDE: 600; 310; 30; 20 INJECTION, SOLUTION INTRAVENOUS at 10:02

## 2018-09-17 RX ADMIN — PROPOFOL 140 MCG/KG/MIN: 10 INJECTION, EMULSION INTRAVENOUS at 10:34

## 2018-09-17 NOTE — IP AVS SNAPSHOT
[facility]    [address]    [place] [address]    Phone:  [phone]    Fax:  [phone]                                       After Visit Summary   9/17/2018    Lauro Pritchard    MRN: [id number]           After Visit Summary Signature Page     I have received my discharge instructions, and my questions have been answered. I have discussed any challenges I see with this plan with the nurse or doctor.    ..........................................................................................................................................  Patient/Patient Representative Signature      ..........................................................................................................................................  Patient Representative Print Name and Relationship to Patient    ..................................................               ................................................  Date                                   Time    ..........................................................................................................................................  Reviewed by Signature/Title    ...................................................              ..............................................  Date                                               Time          22EPIC Rev 08/18

## 2018-09-17 NOTE — DISCHARGE INSTRUCTIONS
[facility]   Adult Discharge Orders & Instructions     For 12 hours after surgery    1. Get plenty of rest.  A responsible adult must stay with you for at least 12 hours after you leave the hospital.   2. Do not drive or use heavy equipment.  If you have weakness or tingling, don't drive or use heavy equipment until this feeling goes away.  3. Do not drink alcohol.  4. Avoid strenuous or risky activities.  Ask for help when climbing stairs.   5. You may feel lightheaded.  IF so, sit for a few minutes before standing.  Have someone help you get up.   6. If you have nausea (feel sick to your stomach): Drink only clear liquids such as apple juice, ginger ale, broth or 7-Up.  Rest may also help.  Be sure to drink enough fluids.  Move to a regular diet as you feel able.  7. You may have a slight fever. Call the doctor if your fever is over 101 F (38.3 C) (taken under the tongue) or lasts longer than 24 hours.  8. You may have a dry mouth, a sore throat, muscle aches or trouble sleeping.  These should go away after 24 hours.  9. Do not make important or legal decisions.   Call your doctor for any of the followin.  Signs of infection (fever, growing tenderness at the surgery site, a large amount of drainage or bleeding, severe pain, foul-smelling drainage, redness, swelling).    2. It has been over 8 to 10 hours since surgery and you are still not able to urinate (pass water).    3.  Headache for over 24 hours.    4.  Numbness, tingling or weakness the day after surgery (if you had spinal anesthesia).  To contact a doctor, call _________[phone]_______________________

## 2018-09-17 NOTE — ANESTHESIA CARE TRANSFER NOTE
Patient: Lauro Pritchard    Procedure(s):  Colonoscopy - Wound Class: III-Contaminated    Diagnosis: history of polyps  Diagnosis Additional Information: No value filed.    Anesthesia Type:   MAC     Note:  Airway :Room Air  Patient transferred to:Phase II  Handoff Report: Identifed the Patient, Identified the Reponsible Provider, Reviewed the pertinent medical history, Discussed the surgical course, Reviewed Intra-OP anesthesia mangement and issues during anesthesia, Set expectations for post-procedure period and Allowed opportunity for questions and acknowledgement of understanding      Vitals: (Last set prior to Anesthesia Care Transfer)    CRNA VITALS  9/17/2018 1038 - 9/17/2018 1111      9/17/2018             Pulse: 58    Ht Rate: 58    SpO2: 95 %    Resp Rate (set): 10                Electronically Signed By: JENNIFER oJhns CRNA  September 17, 2018  11:11 AM

## 2018-09-17 NOTE — OP NOTE
PROCEDURE NOTE    DATE OF SERVICE: 9/17/2018    SURGEON: TOMÁS Vidal MD     PRE-OP DIAGNOSIS:    History of Polyps        POST-OP DIAGNOSIS:  Same  Sigmoid Diverticulosis  Polyps at splenic flexure     PROCEDURE:   Colonoscopy with biopsy         ANESTHESIA:  KRYSTYNA Liz CRNA    INDICATION FOR THE PROCEDURE: The patient is a 73 year old male. The patient has no other complaints. After explaining the risks to include bleeding, perforation, potential inability toreach the cecum, the patient wished to proceed.    PROCEDURE:After adequate sedation, the patient was in the left lateral decubitus position.  Rectal exam was performed.  There was normal tone and no palpable masses.  The colonoscope was introduced into the rectum and advanced to the cecum with Mild difficulty.  The patient's prep was excellent.  The terminal cecum was reached.  The cecum, ascending, transverse, descending and sigmoid colon was remarkable for one small <5mm polyp at the splenic felxure. The polyp was removed with cold forceps. The scope was retroflexed in the rectum.  The rectum was unremarkable.  The scope was straightened and removed.  The patient tolerated the procedure well.     ESTIMATED BLOOD LOSS: none    COMPLICATIONS:  None    TISSUE REMOVED:  Yes    RECOMMEND:    Follow-up pending pathology  Fiber  Given literature on diverticulosis    TOMÁS Vidal MD

## 2018-09-17 NOTE — H&P
PRE-PROCEDURE NOTE    CHIEFCOMPLAINT / REASON FOR PROCEDURE:  History of polyps    PERTINENT HISTORY   Patient is due for colonoscopy. Personal history of polyps. 2015 transverse colon polyps, tubular adenoma. No family history of colon cancer. Denies diarrhea, constipation or bloody/black tarry stools.       Past Medical History:   Diagnosis Date     Anxiety disorder     lifetime       Past Surgical History:   Procedure Laterality Date     ARTHROSCOPY KNEE      2015     COLONOSCOPY      2010,follow up due 2015 -- s/p polyp removal --> next in 2018     COLONOSCOPY      9/10/2015,69390.0,MO COLONOSCOPY REMOVE JULIO POLYP LESN SNARE     SHOULDER SURGERY Right 2016         Other:  None  Bleeding tendencies: No     Relevant Family History:  None     Relevant Social History:  None     10 point ROS of systems including Constitutional, Eyes, Respiratory, Cardiovascular, Gastroenterology, Genitourinary, Integumentary, Muscularskeletal, Psychiatric were all negative except for pertinent positives noted in my HPI.      ALLERGIES/SENSITIVITIES: No Known Allergies     CURRENT MEDICATIONS:      No current facility-administered medications on file prior to encounter.   Current Outpatient Prescriptions on File Prior to Encounter:  bisacodyl (DULCOLAX) 5 MG EC tablet Use as directed per colonoscopy prep.   omeprazole (PRILOSEC) 20 MG CR capsule Take 1 capsule (20 mg) by mouth daily Take 30 to 60 minutes prior to 1st meal of the day.   pravastatin (PRAVACHOL) 80 MG tablet Take 1 tablet (80 mg) by mouth At Bedtime   sertraline (ZOLOFT) 25 MG tablet Take 1 tablet (25 mg) by mouth daily           PRE-SEDATION ASSESSMENT:    LUNGS:  CTA B/L, no wheezing or crackles.  Heart & CV:  RRR no murmur.  Intact distal pulses, good cap refill.        IMPRESSION: 73 year old male in need of colonoscopy for history of polyps.     PLAN:  I discussed colonoscopy with possible biopsy with the patient, including risks and benefits. Patient demonstrated  understanding and is willing to proceed.       Tristan Vidal MD    9/17/2018 10:24 AM

## 2018-09-17 NOTE — IP AVS SNAPSHOT
MRN:[id number]                      After Visit Summary   9/17/2018    Lauro Pritchard    MRN: [id number]           Thank you!     Thank you for choosing [facility] for your care. Our goal is always to provide you with excellent care. Hearing back from our patients is one way we can continue to improve our services. Please take a few minutes to complete the written survey that you may receive in the mail after you visit with us. Thank you!        Patient Information     Date Of Birth          1945        Designated Caregiver       Most Recent Value    Caregiver    Will someone help with your care after discharge? yes      About your hospital stay     You were admitted on:  September 17, 2018 You last received care in the:  [facility]    You were discharged on:  September 17, 2018       Who to Call     For medical emergencies, please call 911.  For non-urgent questions about your medical care, please call your primary care provider or clinic, [phone]  For questions related to your surgery, please call your surgery clinic        Attending Provider     Provider Tristan Prescott MD General Surgery       Primary Care Provider Office Phone # Fax #    Marco A Hampton [phone]      After Care Instructions     Discharge Instructions       Resume pre procedure diet            Discharge Instructions       Restart home medications.                  Further instructions from your care team       [facility]   Adult Discharge Orders & Instructions     For 12 hours after surgery    1. Get plenty of rest.  A responsible adult must stay with you for at least 12 hours after you leave the hospital.   2. Do not drive or use heavy equipment.  If you have weakness or tingling, don't drive or use heavy equipment until this feeling goes away.  3. Do not drink alcohol.  4. Avoid strenuous or risky activities.  Ask for help when climbing  stairs.   5. You may feel lightheaded.  IF so, sit for a few minutes before standing.  Have someone help you get up.   6. If you have nausea (feel sick to your stomach): Drink only clear liquids such as apple juice, ginger ale, broth or 7-Up.  Rest may also help.  Be sure to drink enough fluids.  Move to a regular diet as you feel able.  7. You may have a slight fever. Call the doctor if your fever is over 101 F (38.3 C) (taken under the tongue) or lasts longer than 24 hours.  8. You may have a dry mouth, a sore throat, muscle aches or trouble sleeping.  These should go away after 24 hours.  9. Do not make important or legal decisions.   Call your doctor for any of the followin.  Signs of infection (fever, growing tenderness at the surgery site, a large amount of drainage or bleeding, severe pain, foul-smelling drainage, redness, swelling).    2. It has been over 8 to 10 hours since surgery and you are still not able to urinate (pass water).    3.  Headache for over 24 hours.    4.  Numbness, tingling or weakness the day after surgery (if you had spinal anesthesia).  To contact a doctor, call _________[phone]_______________________    Pending Results     Date and Time Order Name Status Description    2018 1056 Surgical pathology exam In process             Admission Information     Date & Time Provider Department Dept. Phone    2018 Tristan Vidal MD [facility] [phone]      Your Vitals Were     Blood Pressure Temperature Respirations Height Weight Pulse Oximetry    120/80 98.2  F (36.8  C) (Temporal) 16 1.829 m (6') 87.9 kg (193 lb 12.8 oz) 94%    BMI (Body Mass Index)                   26.28 kg/m2           MyChart Information     [facility] gives you secure access to your electronic health record. If you see a primary care provider, you can also send messages to your care team and make appointments. If you have questions, please call your primary care clinic.   If you do not have a primary care provider, please call [phone] and they will assist you.        Care EveryWhere ID     This is your Care EveryWhere ID. This could be used by other organizations to access your [facility] medical records  [id number]        Equal Access to Services     LONNYINDIA MISTY : Hadii aad ku hadkarenadavid Sosushilaali, waaxda luqadaha, qaybta kaalmada adeegyada, waxred salbador estelitadoug lund grcaesimona manrique. So [facility] [phone].    ATENCIÓN: Si habla español, tiene a nava disposición servicios gratuitos de asistencia lingüística. Llame al [phone].    We comply with applicable federal civil rights laws and [place] laws. We do not discriminate on the basis of race, color, national origin, age, disability, sex, sexual orientation, or gender identity.               Review of your medicines      UNREVIEWED medicines. Ask your doctor about these medicines        Dose / Directions    bisacodyl 5 MG EC tablet   Used for:  H/O adenomatous polyp of colon        Use as directed per colonoscopy prep.   Quantity:  2 tablet   Refills:  0       omeprazole 20 MG CR capsule   Commonly known as:  priLOSEC   Used for:  Heartburn        Dose:  20 mg   Take 1 capsule (20 mg) by mouth daily Take 30 to 60 minutes prior to 1st meal of the day.   Quantity:  90 capsule   Refills:  3       pravastatin 80 MG tablet   Commonly known as:  PRAVACHOL   Used for:  Mixed hyperlipidemia        Dose:  80 mg   Take 1 tablet (80 mg) by mouth At Bedtime   Quantity:  90 tablet   Refills:  3       sertraline 25 MG tablet   Commonly known as:  ZOLOFT   Used for:  Anxiety        Dose:  25 mg   Take 1 tablet (25 mg) by mouth daily   Quantity:  90 tablet   Refills:  3                Protect others around you: Learn how to safely use, store and throw away your medicines at www.disposemymeds.org.             Medication List: This is a list of all your medications and when to take them. Check marks below indicate your daily home schedule. Keep  this list as a reference.      Medications           Morning Afternoon Evening Bedtime As Needed    bisacodyl 5 MG EC tablet   Use as directed per colonoscopy prep.                                omeprazole 20 MG CR capsule   Commonly known as:  priLOSEC   Take 1 capsule (20 mg) by mouth daily Take 30 to 60 minutes prior to 1st meal of the day.                                pravastatin 80 MG tablet   Commonly known as:  PRAVACHOL   Take 1 tablet (80 mg) by mouth At Bedtime                                sertraline 25 MG tablet   Commonly known as:  ZOLOFT   Take 1 tablet (25 mg) by mouth daily

## 2018-09-17 NOTE — ANESTHESIA POSTPROCEDURE EVALUATION
Patient: Lauro Pritchard    Procedure(s):  Colonoscopy - Wound Class: III-Contaminated    Diagnosis:history of polyps  Diagnosis Additional Information: No value filed.    Anesthesia Type:  MAC    Note:  Anesthesia Post Evaluation    Patient location during evaluation: Phase 2  Patient participation: Able to fully participate in evaluation  Level of consciousness: awake and alert  Pain management: adequate  Airway patency: patent  Cardiovascular status: blood pressure returned to baseline, acceptable and hemodynamically stable  Respiratory status: acceptable  Hydration status: acceptable  PONV: none     Anesthetic complications: None          Last vitals:  Vitals:    09/17/18 0944   BP: 145/84   Resp: 16   Temp: 98.3  F (36.8  C)   SpO2: 95%         Electronically Signed By: JENNIFER Johns CRNA  September 17, 2018  11:21 AM

## 2018-09-19 PROBLEM — K63.5 COLON POLYPS: Status: RESOLVED | Noted: 2018-09-17 | Resolved: 2018-09-19

## 2018-09-25 ENCOUNTER — HOSPITAL ENCOUNTER (OUTPATIENT)
Dept: GENERAL RADIOLOGY | Facility: OTHER | Age: 73
Discharge: HOME | End: 2018-09-25
Attending: INTERNAL MEDICINE | Admitting: INTERNAL MEDICINE
Payer: MEDICARE

## 2018-09-25 DIAGNOSIS — M54.2 CHRONIC NECK PAIN: ICD-10-CM

## 2018-09-25 DIAGNOSIS — M62.830 MUSCLE SPASM OF BACK: ICD-10-CM

## 2018-09-25 DIAGNOSIS — G89.29 CHRONIC NECK PAIN: ICD-10-CM

## 2018-09-25 DIAGNOSIS — M19.90 ARTHRITIS: ICD-10-CM

## 2018-09-25 PROCEDURE — 25500064 ZZH RX 255 OP 636: Performed by: RADIOLOGY

## 2018-09-25 PROCEDURE — 64490 INJ PARAVERT F JNT C/T 1 LEV: CPT | Mod: 50

## 2018-09-25 PROCEDURE — 25000125 ZZHC RX 250: Performed by: RADIOLOGY

## 2018-09-25 PROCEDURE — 25000128 H RX IP 250 OP 636: Mod: GZ | Performed by: RADIOLOGY

## 2018-09-25 RX ORDER — BUPIVACAINE HYDROCHLORIDE 5 MG/ML
2 INJECTION, SOLUTION PERINEURAL ONCE
Status: COMPLETED | OUTPATIENT
Start: 2018-09-25 | End: 2018-09-25

## 2018-09-25 RX ORDER — TRIAMCINOLONE ACETONIDE 40 MG/ML
80 INJECTION, SUSPENSION INTRA-ARTICULAR; INTRAMUSCULAR ONCE
Status: COMPLETED | OUTPATIENT
Start: 2018-09-25 | End: 2018-09-25

## 2018-09-25 RX ADMIN — LIDOCAINE HYDROCHLORIDE 2 ML: 10 INJECTION, SOLUTION INFILTRATION; PERINEURAL at 15:39

## 2018-09-25 RX ADMIN — TRIAMCINOLONE ACETONIDE 80 MG: 40 INJECTION, SUSPENSION INTRA-ARTICULAR; INTRAMUSCULAR at 15:39

## 2018-09-25 RX ADMIN — IOHEXOL 2 ML: 240 INJECTION, SOLUTION INTRATHECAL; INTRAVASCULAR; INTRAVENOUS; ORAL at 15:39

## 2018-09-25 RX ADMIN — BUPIVACAINE HYDROCHLORIDE 20 MG: 5 INJECTION, SOLUTION PERINEURAL at 15:38

## 2018-10-17 ENCOUNTER — MYC MEDICAL ADVICE (OUTPATIENT)
Dept: INTERNAL MEDICINE | Facility: OTHER | Age: 73
End: 2018-10-17

## 2018-10-17 DIAGNOSIS — M54.2 CHRONIC NECK PAIN: Primary | ICD-10-CM

## 2018-10-17 DIAGNOSIS — G89.29 CHRONIC NECK PAIN: Primary | ICD-10-CM

## 2018-10-26 ENCOUNTER — HOSPITAL ENCOUNTER (OUTPATIENT)
Dept: PHYSICAL THERAPY | Facility: OTHER | Age: 73
Setting detail: THERAPIES SERIES
End: 2018-10-26
Attending: INTERNAL MEDICINE
Payer: MEDICARE

## 2018-10-26 DIAGNOSIS — M54.2 CHRONIC NECK PAIN: ICD-10-CM

## 2018-10-26 DIAGNOSIS — G89.29 CHRONIC NECK PAIN: ICD-10-CM

## 2018-10-26 PROCEDURE — 97140 MANUAL THERAPY 1/> REGIONS: CPT | Mod: GP | Performed by: PHYSICAL THERAPIST

## 2018-10-26 PROCEDURE — G8982 BODY POS GOAL STATUS: HCPCS | Mod: GP,CI | Performed by: PHYSICAL THERAPIST

## 2018-10-26 PROCEDURE — 40000185 ZZHC STATISTIC PT OUTPT VISIT: Performed by: PHYSICAL THERAPIST

## 2018-10-26 PROCEDURE — 97161 PT EVAL LOW COMPLEX 20 MIN: CPT | Mod: GP | Performed by: PHYSICAL THERAPIST

## 2018-10-26 PROCEDURE — 97110 THERAPEUTIC EXERCISES: CPT | Mod: GP | Performed by: PHYSICAL THERAPIST

## 2018-10-26 PROCEDURE — G8981 BODY POS CURRENT STATUS: HCPCS | Mod: GP,CK | Performed by: PHYSICAL THERAPIST

## 2018-10-26 NOTE — PROGRESS NOTES
" 10/26/18 1300   General Information   Type of Visit Initial OP Ortho PT Evaluation   Start of Care Date 10/26/18   Referring Physician Memo   Patient/Family Goals Statement Reduce neck pain   Orders Evaluate and Treat   Date of Order 10/17/18   Insurance Type Medicare   Medical Diagnosis Chronic neck pain M54.2, G89.29    Surgical/Medical history reviewed Yes   Precautions/Limitations no known precautions/limitations   General Information Comments please refer to pt's medical record for any additional information   Body Part(s)   Body Part(s) Cervical Spine   Presentation and Etiology   Pertinent history of current problem (include personal factors and/or comorbidities that impact the POC) Pt has been having pain in his neck area for \"years\" but it seems to have increased over the past 6 months. The good majority of his pain is with cervical rotation movements.    Impairments A. Pain;D. Decreased ROM;E. Decreased flexibility   Functional Limitations perform activities of daily living;perform desired leisure / sports activities   Symptom Location cervical spine   How/Where did it occur With repetition/overuse;From insidious onset   Onset date of current episode/exacerbation 10/26/17  (chronic)   Chronicity Chronic   Pain rating (0-10 point scale) Best (/10);Worst (/10)   Best (/10) 2   Worst (/10) 6   Pain quality B. Dull   Frequency of pain/symptoms C. With activity   Pain/symptoms are: Worse during the day   Pain/symptoms exacerbated by G. Certain positions  (cervical rotation)   Pain/symptoms eased by E. Changing positions;F. Certain positions   Progression of symptoms since onset: Worsened   Current / Previous Interventions   Diagnostic Tests: MRI   MRI Results Results   MRI results stenosis at multiple levels - see pt's chart for full MRI results   Prior Level of Function   Prior Level of Function-Mobility Ind   Prior Level of Function-ADLs Ind   Current Level of Function   Current Community Support " Family/friend caregiver   Patient role/employment history F. Retired  (electrican)   [facility]   Current equipment-Gait/Locomotion None   Current equipment-ADL None   Fall Risk Screen   Fall screen completed by PT   Have you fallen 2 or more times in the past year? No   Have you fallen and had an injury in the past year? No   Is patient a fall risk? No   Abuse Risk Screen   QUESTION TO PATIENT:  Has a member of your family or a partner(now or in the past) intimidated, hurt, manipulated, or controlled you in any way? no   QUESTION TO PATIENT: Do you feel safe going back to the place where you are living? yes   OBSERVATION: Is there reason to believe there has been maltreatment of a vulnerable adult (ie. Physical/Sexual/Emotional abuse, self neglect, lack of adequate food, shelter, medical care, or financial exploitation)? no   Functional Scales   Functional Scales Other   Other Scales  PSFS   Cervical Spine   Observation pt is pleasant and in no acute distress   Posture forward head & rounded shoulders   Cervical Flexion ROM 50   Cervical Extension ROM 60   Cervical Right Side Bending ROM 20   Cervical Left Side Bending ROM 25   Cervical Right Rotation ROM 40   Cervical Left Rotation ROM 30   Cervical/Thoracic/Shoulder ROM Comments normal shoulder ROM   Shoulder/Wrist/Hand Strength Comments normal   Cervical Flexibility Comments tightness noted with visual observation   Spurling Test NEG   Cervical Distraction Test NEG   Palpation soft tissue tighntess throughout bilat UTs and cervical paraspinals   Neurological Testing Comments no UE dermatome or myotome deficits    Planned Therapy Interventions   Planned Therapy Interventions joint mobilization;motor coordination training;manual therapy;neuromuscular re-education;strengthening;stretching;ROM   Planned Modality Interventions   Planned Modality Interventions Cryotherapy;Electrical stimulation;Ultrasound   Clinical Impression   Criteria for  Skilled Therapeutic Interventions Met yes, treatment indicated   PT Diagnosis cervical pain   Influenced by the following impairments decreased cervical ROM   Functional limitations due to impairments ADLs   Clinical Presentation Stable/Uncomplicated   Clinical Decision Making (Complexity) Low complexity   Therapy Frequency 2 times/Week   Predicted Duration of Therapy Intervention (days/wks) 8 weeks   Risk & Benefits of therapy have been explained Yes   Patient, Family & other staff in agreement with plan of care Yes   ORTHO GOALS   PT Ortho Eval Goals 1;2;3   Ortho Goal 1   Goal Identifier Pain   Goal Description Pt to report a max pain level of 1/10 for improved ADLs   Target Date 12/21/18   Ortho Goal 2   Goal Identifier Driving   Goal Description Pt to be able to look over his shoulders while driving with less discomfort for improved driving/ADLs   Target Date 12/21/18   Ortho Goal 3   Goal Identifier ADLs   Goal Description Pt to be able to do house chores, ie looking up while cleaning fans, without increasing neck pain for improved ADLs   Target Date 12/21/18   Total Evaluation Time   Total Evaluation Time 20   Therapy Certification   Certification date from 10/26/18   Certification date to 12/21/18   Medical Diagnosis Chronic neck pain M54.2, G89.29

## 2018-10-26 NOTE — PROGRESS NOTES
[facility]          OUTPATIENT PHYSICAL THERAPY ORTHOPEDIC EVALUATION  PLAN OF TREATMENT FOR OUTPATIENT REHABILITATION  (COMPLETE FOR INITIAL CLAIMS ONLY)  Patient's Last Name, First Name, M.I.  YOB: 1945  FrancheskaLauro  CABRERA    Provider s Name:  [facility]   Medical Record No.  [id number]   Start of Care Date:  10/26/18   Onset Date:  10/26/17 (chronic)   Type:     _X__PT   ___OT   ___SLP Medical Diagnosis:  Chronic neck pain M54.2, G89.29      PT Diagnosis:  cervical pain   Visits from SOC:  1      _________________________________________________________________________________  Plan of Treatment/Functional Goals:  joint mobilization, motor coordination training, manual therapy, neuromuscular re-education, strengthening, stretching, ROM     Cryotherapy, Electrical stimulation, Ultrasound     Goals  Goal Identifier: Pain  Goal Description: Pt to report a max pain level of 1/10 for improved ADLs  Target Date: 12/21/18    Goal Identifier: Driving  Goal Description: Pt to be able to look over his shoulders while driving with less discomfort for improved driving/ADLs  Target Date: 12/21/18    Goal Identifier: ADLs  Goal Description: Pt to be able to do house chores, ie looking up while cleaning fans, without increasing neck pain for improved ADLs  Target Date: 12/21/18       Therapy Frequency:  (P) 2 times/Week  Predicted Duration of Therapy Intervention:  (P) 8 weeks    Coleman Kaufman, PT                 I CERTIFY THE NEED FOR THESE SERVICES FURNISHED UNDER        THIS PLAN OF TREATMENT AND WHILE UNDER MY CARE     (Physician co-signature of this document indicates review and certification of the therapy plan).                       Certification Date From:  10/26/18   Certification Date To:  12/21/18    Referring Provider:  Memo    Initial Assessment        See Epic Evaluation Start of Care Date: 10/26/18

## 2018-10-29 DIAGNOSIS — F41.9 ANXIETY: ICD-10-CM

## 2018-10-31 ENCOUNTER — HOSPITAL ENCOUNTER (OUTPATIENT)
Dept: PHYSICAL THERAPY | Facility: OTHER | Age: 73
Setting detail: THERAPIES SERIES
End: 2018-10-31
Attending: INTERNAL MEDICINE
Payer: MEDICARE

## 2018-10-31 PROCEDURE — 97110 THERAPEUTIC EXERCISES: CPT | Mod: GP | Performed by: PHYSICAL THERAPIST

## 2018-10-31 PROCEDURE — 97140 MANUAL THERAPY 1/> REGIONS: CPT | Mod: GP | Performed by: PHYSICAL THERAPIST

## 2018-10-31 PROCEDURE — 40000185 ZZHC STATISTIC PT OUTPT VISIT: Performed by: PHYSICAL THERAPIST

## 2018-11-01 RX ORDER — SERTRALINE HYDROCHLORIDE 25 MG/1
TABLET, FILM COATED ORAL
Qty: 90 TABLET | Refills: 0 | OUTPATIENT
Start: 2018-11-01

## 2018-11-02 ENCOUNTER — MYC MEDICAL ADVICE (OUTPATIENT)
Dept: INTERNAL MEDICINE | Facility: OTHER | Age: 73
End: 2018-11-02

## 2018-11-02 ENCOUNTER — HOSPITAL ENCOUNTER (OUTPATIENT)
Dept: PHYSICAL THERAPY | Facility: OTHER | Age: 73
Setting detail: THERAPIES SERIES
End: 2018-11-02
Attending: INTERNAL MEDICINE
Payer: MEDICARE

## 2018-11-02 DIAGNOSIS — F41.9 ANXIETY: ICD-10-CM

## 2018-11-02 PROCEDURE — 40000185 ZZHC STATISTIC PT OUTPT VISIT

## 2018-11-02 PROCEDURE — 97140 MANUAL THERAPY 1/> REGIONS: CPT | Mod: GP

## 2018-11-02 PROCEDURE — 97110 THERAPEUTIC EXERCISES: CPT | Mod: GP

## 2018-11-06 RX ORDER — SERTRALINE HYDROCHLORIDE 25 MG/1
TABLET, FILM COATED ORAL
Qty: 90 TABLET | Refills: 0 | OUTPATIENT
Start: 2018-11-06

## 2018-11-06 NOTE — TELEPHONE ENCOUNTER
Filled 8/15/18 #90 x 3. Pharmacy alerted. Unable to complete prescription refill per RNMedication Refill Policy.................... Lizbeth Kerr ....................  11/6/2018   3:14 PM    sertraline (ZOLOFT) 25 MG tablet 90 tablet 3 8/15/2018  No   Sig - Route: Take 1 tablet (25 mg) by mouth daily - Oral   Class: E-Prescribe   Order: [id number]   E-Prescribing Status: Receipt confirmed by pharmacy (8/15/2018  8:41 AM CDT)   Printout Tracking   External Result Report   Pharmacy   [id number] - [place], [place] - [address]

## 2018-11-07 ENCOUNTER — HOSPITAL ENCOUNTER (OUTPATIENT)
Dept: PHYSICAL THERAPY | Facility: OTHER | Age: 73
Setting detail: THERAPIES SERIES
End: 2018-11-07
Attending: INTERNAL MEDICINE
Payer: MEDICARE

## 2018-11-07 PROCEDURE — 97110 THERAPEUTIC EXERCISES: CPT | Mod: GP | Performed by: PHYSICAL THERAPIST

## 2018-11-07 PROCEDURE — 40000185 ZZHC STATISTIC PT OUTPT VISIT: Performed by: PHYSICAL THERAPIST

## 2018-11-07 PROCEDURE — 97140 MANUAL THERAPY 1/> REGIONS: CPT | Mod: GP | Performed by: PHYSICAL THERAPIST

## 2018-11-09 ENCOUNTER — HOSPITAL ENCOUNTER (OUTPATIENT)
Dept: PHYSICAL THERAPY | Facility: OTHER | Age: 73
Setting detail: THERAPIES SERIES
End: 2018-11-09
Attending: INTERNAL MEDICINE
Payer: MEDICARE

## 2018-11-09 PROCEDURE — 97140 MANUAL THERAPY 1/> REGIONS: CPT | Mod: GP | Performed by: PHYSICAL THERAPIST

## 2018-11-09 PROCEDURE — 40000185 ZZHC STATISTIC PT OUTPT VISIT: Performed by: PHYSICAL THERAPIST

## 2018-11-09 PROCEDURE — 97110 THERAPEUTIC EXERCISES: CPT | Mod: GP | Performed by: PHYSICAL THERAPIST

## 2018-11-14 ENCOUNTER — HOSPITAL ENCOUNTER (OUTPATIENT)
Dept: PHYSICAL THERAPY | Facility: OTHER | Age: 73
Setting detail: THERAPIES SERIES
End: 2018-11-14
Attending: INTERNAL MEDICINE
Payer: MEDICARE

## 2018-11-14 PROCEDURE — 40000185 ZZHC STATISTIC PT OUTPT VISIT: Performed by: PHYSICAL THERAPIST

## 2018-11-14 PROCEDURE — 97110 THERAPEUTIC EXERCISES: CPT | Mod: GP | Performed by: PHYSICAL THERAPIST

## 2018-11-14 PROCEDURE — 97140 MANUAL THERAPY 1/> REGIONS: CPT | Mod: GP | Performed by: PHYSICAL THERAPIST

## 2018-11-28 ENCOUNTER — OFFICE VISIT (OUTPATIENT)
Dept: INTERNAL MEDICINE | Facility: OTHER | Age: 73
End: 2018-11-28
Attending: INTERNAL MEDICINE
Payer: COMMERCIAL

## 2018-11-28 VITALS
HEART RATE: 64 BPM | WEIGHT: 199.38 LBS | BODY MASS INDEX: 27.04 KG/M2 | DIASTOLIC BLOOD PRESSURE: 78 MMHG | SYSTOLIC BLOOD PRESSURE: 144 MMHG

## 2018-11-28 DIAGNOSIS — M48.02 NEUROFORAMINAL STENOSIS OF CERVICAL SPINE: Primary | ICD-10-CM

## 2018-11-28 PROCEDURE — G0463 HOSPITAL OUTPT CLINIC VISIT: HCPCS

## 2018-11-28 PROCEDURE — 99213 OFFICE O/P EST LOW 20 MIN: CPT | Performed by: INTERNAL MEDICINE

## 2018-11-28 ASSESSMENT — PATIENT HEALTH QUESTIONNAIRE - PHQ9
5. POOR APPETITE OR OVEREATING: NOT AT ALL
SUM OF ALL RESPONSES TO PHQ QUESTIONS 1-9: 0

## 2018-11-28 ASSESSMENT — ENCOUNTER SYMPTOMS
NECK PAIN: 1
NUMBNESS: 0
NECK STIFFNESS: 1
MYALGIAS: 1

## 2018-11-28 ASSESSMENT — ANXIETY QUESTIONNAIRES
5. BEING SO RESTLESS THAT IT IS HARD TO SIT STILL: NOT AT ALL
6. BECOMING EASILY ANNOYED OR IRRITABLE: NOT AT ALL
GAD7 TOTAL SCORE: 0
3. WORRYING TOO MUCH ABOUT DIFFERENT THINGS: NOT AT ALL
7. FEELING AFRAID AS IF SOMETHING AWFUL MIGHT HAPPEN: NOT AT ALL
1. FEELING NERVOUS, ANXIOUS, OR ON EDGE: NOT AT ALL
IF YOU CHECKED OFF ANY PROBLEMS ON THIS QUESTIONNAIRE, HOW DIFFICULT HAVE THESE PROBLEMS MADE IT FOR YOU TO DO YOUR WORK, TAKE CARE OF THINGS AT HOME, OR GET ALONG WITH OTHER PEOPLE: NOT DIFFICULT AT ALL
2. NOT BEING ABLE TO STOP OR CONTROL WORRYING: NOT AT ALL

## 2018-11-28 ASSESSMENT — PAIN SCALES - GENERAL: PAINLEVEL: MODERATE PAIN (5)

## 2018-11-28 NOTE — PATIENT INSTRUCTIONS
Understanding Radiofrequency Denervation  Radiofrequency denervation is a treatment choice for some kinds of lower back and neck pain. It uses an electrical current created by radio waves. The radio waves make heat that destroys nerves along the spine that are causing pain. It s also known as radiofrequency lesioning, ablation, neurotomy, or rhizomotomy.  How to say it  RAY-antelmo-oh-FREE-nisha-see Michelle-megan   Why radiofrequency denervation is done  This treatment may be an option to reduce or stop lower back or neck pain that has lasted for a month or more. It can help treat pain caused by arthritis, normal wear and tear, disk disease, injury, and other problems. It s used when other treatment hasn t worked, such as medicine and physical therapy. It may be done after an injection of medicine into the nerve area to confirm that the nerve will respond to treatment.  How radiofrequency denervation is done  The procedure is done in a hospital or medical clinic. During the treatment:    You lie on your stomach. The area in your back or neck to be treated may be numbed. You may be given some medicine to help you relax.    The healthcare provider inserts a thin tube through the skin over the spine in your lower back or neck. He or she uses moving X-ray machine called a fluoroscope to help make sure the thin tube is in the right place. You may feel some discomfort.    When the tube is in place, the provider puts a wire through the tube. The wire is connected to a machine that sends radio waves through the wire. The radio waves heat the nerve and destroy it.    More than one nerve may need to be treated. You can go home 1 to 2 hours after the procedure.   You may feel more pain right after the treatment. The pain should then go away over 1 to 3 weeks. The pain relief may last for less than a month, or for 6 months or more. This depends on what is causing your pain, and how well this treatment can work for it. It may  help you be able to take less medicine for pain. The nerve will likely grow back. But it may not cause pain, or as much pain as before.  Risks of radiofrequency denervation    More pain after the procedure for a period of time    Mild burning feeling that may last up to 3 weeks    Numbness in the area that may last up to 4 months  Date Last Reviewed: 6/1/2016 2000-2018 [employer]. [address] [place] [address]. All rights reserved. This information is not intended as a substitute for professional medical care. Always follow your healthcare professional's instructions.    Return as needed for follow-up with Dr. Hampton.    Clinic : [phone]  Appointment line: [phone]

## 2018-11-28 NOTE — NURSING NOTE
Patient presents to the clinic for follow up with chronic neck pain.      Chief Complaint   Patient presents with     Clinic Care Coordination - Follow-up       Initial There were no vitals taken for this visit. Estimated body mass index is 26.28 kg/(m^2) as calculated from the following:    Height as of 9/17/18: 6' (1.829 m).    Weight as of 9/17/18: 193 lb 12.8 oz (87.9 kg).  Medication Reconciliation: complete    Emmanuelle Austin LPN

## 2018-11-28 NOTE — MR AVS SNAPSHOT
After Visit Summary   11/28/2018    Lauro Pritchard    MRN: [id number]           Patient Information     Date Of Birth          1945        Visit Information        Provider Department      11/28/2018 9:20 AM Marco A Hampton MD [facility]        Today's Diagnoses     Neuroforaminal stenosis of cervical spine    -  1      Care Instructions    Understanding Radiofrequency Denervation  Radiofrequency denervation is a treatment choice for some kinds of lower back and neck pain. It uses an electrical current created by radio waves. The radio waves make heat that destroys nerves along the spine that are causing pain. It s also known as radiofrequency lesioning, ablation, neurotomy, or rhizomotomy.  How to say it  RAY-antelmo-oh-FREE-nisha-oanh villa-Phuong   Why radiofrequency denervation is done  This treatment may be an option to reduce or stop lower back or neck pain that has lasted for a month or more. It can help treat pain caused by arthritis, normal wear and tear, disk disease, injury, and other problems. It s used when other treatment hasn t worked, such as medicine and physical therapy. It may be done after an injection of medicine into the nerve area to confirm that the nerve will respond to treatment.  How radiofrequency denervation is done  The procedure is done in a hospital or medical clinic. During the treatment:    You lie on your stomach. The area in your back or neck to be treated may be numbed. You may be given some medicine to help you relax.    The healthcare provider inserts a thin tube through the skin over the spine in your lower back or neck. He or she uses moving X-ray machine called a fluoroscope to help make sure the thin tube is in the right place. You may feel some discomfort.    When the tube is in place, the provider puts a wire through the tube. The wire is connected to a machine that sends radio waves through the wire. The radio waves heat the nerve  and destroy it.    More than one nerve may need to be treated. You can go home 1 to 2 hours after the procedure.   You may feel more pain right after the treatment. The pain should then go away over 1 to 3 weeks. The pain relief may last for less than a month, or for 6 months or more. This depends on what is causing your pain, and how well this treatment can work for it. It may help you be able to take less medicine for pain. The nerve will likely grow back. But it may not cause pain, or as much pain as before.  Risks of radiofrequency denervation    More pain after the procedure for a period of time    Mild burning feeling that may last up to 3 weeks    Numbness in the area that may last up to 4 months  Date Last Reviewed: 6/1/2016 2000-2018 [employer]. [address] [place] [address]. All rights reserved. This information is not intended as a substitute for professional medical care. Always follow your healthcare professional's instructions.    Return as needed for follow-up with Dr. Hampton.    Clinic : [phone]  Appointment line: [phone]            Follow-ups after your visit        Who to contact     If you have questions or need follow up information about today's clinic visit or your schedule please contact [facility] directly at [phone].  Normal or non-critical lab and imaging results will be communicated to you by MyChart, letter or phone within 4 business days after the clinic has received the results. If you do not hear from us within 7 days, please contact the clinic through MyChart or phone. If you have a critical or abnormal lab result, we will notify you by phone as soon as possible.  Submit refill requests through [employer] or call your pharmacy and they will forward the refill request to us. Please allow 3 business days for your refill to be completed.          Additional Information About Your Visit        [facility] Information      [employer] gives you secure access to your electronic health record. If you see a primary care provider, you can also send messages to your care team and make appointments. If you have questions, please call your primary care clinic.  If you do not have a primary care provider, please call [phone] and they will assist you.        Care EveryWhere ID     This is your Care EveryWhere ID. This could be used by other organizations to access your [facility] medical records  [id number]        Your Vitals Were     Pulse BMI (Body Mass Index)                64 27.04 kg/m2           Blood Pressure from Last 3 Encounters:   11/28/18 144/78   09/17/18 (!) 133/91   08/15/18 118/72    Weight from Last 3 Encounters:   11/28/18 199 lb 6 oz (90.4 kg)   09/17/18 193 lb 12.8 oz (87.9 kg)   08/15/18 203 lb 2 oz (92.1 kg)              Today, you had the following     No orders found for display       Primary Care Provider Office Phone # Fax #    Marco A Hampton [phone]       [address] [facility] [address]        Equal Access to Services     [facility]: Hadii aad uriah tango Soluis alberto, waaxda lulatoya, qaybta kaalmada ademargotda, stacia gallagher . [facility] [phone].    ATENCIÓN: Si habla español, tiene a nava disposición servicios gratuitos de asistencia lingüística. Llame al [phone].    We comply with applicable federal civil rights laws and [place] laws. We do not discriminate on the basis of race, color, national origin, age, disability, sex, sexual orientation, or gender identity.            Thank you!     Thank you for choosing [facility] AND [facility]  for your care. Our goal is always to provide you with excellent care. Hearing back from our patients is one way we can continue to improve our services. Please take a few minutes to complete the written survey that you may receive in the mail after your visit with us. Thank you!             Your Updated  Medication List - Protect others around you: Learn how to safely use, store and throw away your medicines at www.disposemymeds.org.          This list is accurate as of 11/28/18 10:02 AM.  Always use your most recent med list.                   Brand Name Dispense Instructions for use Diagnosis    omeprazole 20 MG DR capsule    priLOSEC    90 capsule    Take 1 capsule (20 mg) by mouth daily Take 30 to 60 minutes prior to 1st meal of the day.    Heartburn       pravastatin 80 MG tablet    PRAVACHOL    90 tablet    Take 1 tablet (80 mg) by mouth At Bedtime    Mixed hyperlipidemia       sertraline 25 MG tablet    ZOLOFT    90 tablet    Take 1 tablet (25 mg) by mouth daily    Anxiety

## 2018-11-28 NOTE — PROGRESS NOTES
Nursing Notes:   Emmanuelle Austin LPN  11/28/2018  9:51 AM  Signed  Patient presents to the clinic for follow up with chronic neck pain.      Chief Complaint   Patient presents with     Clinic Care Coordination - Follow-up       Initial There were no vitals taken for this visit. Estimated body mass index is 26.28 kg/(m^2) as calculated from the following:    Height as of 9/17/18: 6' (1.829 m).    Weight as of 9/17/18: 193 lb 12.8 oz (87.9 kg).  Medication Reconciliation: complete    Emmanuelle Austin LPN      Nursing note reviewed with patient.  Accurracy and completeness verified.   Mr. Pritchard is a 73 year old male who:  Patient presents with:  Clinic Care Coordination - Follow-up      ICD-10-CM    1. Neuroforaminal stenosis of cervical spine M99.81      HPI  Patient presents today for follow-up of neck pain.  Recently has had a couple different neck injections and the radiologist is now recommending medial branch blocks and/or rhizotomy.    He has many questions about this and is wondering how this might work, what is involved.  He has neuroforaminal stenosis of the cervical spine.  We reviewed some 3D anatomy structures and questions were addressed.    He does not take any blood thinners.  He would be fine to proceed with injections whenever it works for his schedule.    Functional Capacity: > 4 METS.   No orthopnea/paroxysmal nocturnal dyspnea  Review of Systems   Musculoskeletal: Positive for myalgias, neck pain and neck stiffness.   Neurological: Negative for numbness.      JOSE LUIS:   JOSE LUIS-7 SCORE 7/12/2017 8/15/2018 11/28/2018   Total Score 0 0 0     PHQ9:  PHQ-9 SCORE 7/12/2017 8/15/2018 11/28/2018   PHQ-9 Total Score 0 0 0       I have personally reviewed the past medical history, past surgical history, medications, allergies, family and social history as listed below, on 11/28/2018.    No Known Allergies    Current Outpatient Prescriptions   Medication Sig Dispense Refill     omeprazole (PRILOSEC) 20 MG CR capsule  Take 1 capsule (20 mg) by mouth daily Take 30 to 60 minutes prior to 1st meal of the day. 90 capsule 3     pravastatin (PRAVACHOL) 80 MG tablet Take 1 tablet (80 mg) by mouth At Bedtime 90 tablet 3     sertraline (ZOLOFT) 25 MG tablet Take 1 tablet (25 mg) by mouth daily 90 tablet 3        Patient Active Problem List    Diagnosis Date Noted     Neuroforaminal stenosis of cervical spine 11/28/2018     Priority: Medium     Sigmoid diverticulosis 09/17/2018     Priority: Medium     Chronic neck pain 08/15/2018     Priority: Medium     History of prediabetes 07/12/2017     Priority: Medium     Arthritis of right acromioclavicular joint 12/21/2016     Priority: Medium     Complete tear of right rotator cuff 12/21/2016     Priority: Medium     History of colon polyps, tubular adenoma x1, next scope 2028 04/22/2015     Priority: Medium     Mixed hyperlipidemia 09/17/2014     Priority: Medium     Health care maintenance 09/11/2014     Priority: Medium     History of tobacco use 09/11/2014     Priority: Medium     History of anxiety 07/10/2013     Priority: Medium     Past Medical History:   Diagnosis Date     Anxiety disorder     lifetime     History of adenomatous polyp of colon     tubular adenoma x1, next scope 2028     Past Surgical History:   Procedure Laterality Date     ARTHROSCOPY KNEE      2015     COLONOSCOPY      2010,follow up due 2015 -- s/p polyp removal --> next in 2018     COLONOSCOPY      9/10/2015,97168.0,NM COLONOSCOPY REMOVE JULIO POLYP LESN SNARE     COLONOSCOPY  09/17/2018    splenic flexure polyp     COLONOSCOPY N/A 9/17/2018    1 small tubular adenoma follow up 2028     SHOULDER SURGERY Right 2016     Social History     Social History     Marital status:      Spouse name: Pamela     Number of children: N/A     Years of education: N/A     Social History Main Topics     Smoking status: Former Smoker     Packs/day: 0.50     Years: 20.00     Types: Cigarettes     Quit date: 7/10/1985     Smokeless  tobacco: Never Used     Alcohol use 7.0 oz/week     Drug use: No     Sexual activity: No     Other Topics Concern     None     Social History Narrative    , lives with wife, retired .     Family History   Problem Relation Age of Onset     Osteoporosis Mother      Osteoporosis     Genetic Disorder Other      Genetic,Mother - Diabetes.  No FHx of CVA, No early family HX of CAD, no known cancers       EXAM:   Vitals:    11/28/18 0927   BP: 144/78   BP Location: Right arm   Patient Position: Sitting   Cuff Size: Adult Regular   Pulse: 64   Weight: 199 lb 6 oz (90.4 kg)       Current Pain Score: Moderate Pain (5)     BP Readings from Last 3 Encounters:   11/28/18 144/78   09/17/18 (!) 133/91   08/15/18 118/72      Wt Readings from Last 3 Encounters:   11/28/18 199 lb 6 oz (90.4 kg)   09/17/18 193 lb 12.8 oz (87.9 kg)   08/15/18 203 lb 2 oz (92.1 kg)      Estimated body mass index is 27.04 kg/(m^2) as calculated from the following:    Height as of 9/17/18: 6' (1.829 m).    Weight as of this encounter: 199 lb 6 oz (90.4 kg).     Physical Exam   Constitutional: He appears well-developed and well-nourished.   Cardiovascular: Normal rate.    Musculoskeletal: He exhibits tenderness (+ Neck and shoulder muscle tenderness to palpation).    Procedures   INVESTIGATIONS:  Results for orders placed or performed during the hospital encounter of 09/25/18   XR Cervical Thoracic Facet Injection Lvl 2 Lito    Narrative    Procedure: XR CERVICAL THORACIC FACET INJ LEVEL 2 BILATERAL    HISTORY: , Cervical neck pain.  Facet arthropathy.; Muscle spasm of  back; Chronic neck pain; Chronic neck pain; Arthritis    COMPARISON: 9/14/2018.    TECHNIQUE:    The risks and benefits of cervical facet steroid injection were  discussed with the patient, including infection, bleeding / epidural  hematoma, inadvertent intrathecal puncture, spinal headache, nerve  root injury, cord injury and reaction to contrast agents. The  patient  expressed understanding and a willingness to proceed. Written informed  consent was obtained.    This was placed prone on the fluoroscopy table. The C2-3 and C3-4  facet joints were identified via oblique fluoroscopy. The overlying  skin was marked, prepped and draped in the standard sterile fashion.  Lidocaine was used to anesthetize the skin entry site. A 22-gauge  spinal needle was advanced under fluoroscopic observation into the  targeted facet joints. Injection of small volume contrast confirmed  intra-articular positioning. Subsequent administration of the  medications resulted in dilution of the previously seen intraarticular  contrast. 2 mL kenalog 40, 4 mL bupivcaine .5%, 2 mL lido1% local, 2  mL omni 240 The patient tolerated the procedure well. .42 minutes of  fluoro time were used.    Prior to the procedure, the patient had pain 7 out of 10 in severity.  After, 80% improved in severity.      Impression    IMPRESSION:     Successful bilateral C2-3 and C3-4 facet joint steroid injections. R1.    Depending on the duration of relief, these may be repeated after 3  months or longer. If pain returns sooner, medial branch blocks could  be performed prior to potential rhizotomy.      CALOS SANDERS MD       ASSESSMENT AND PLAN:  Problem List Items Addressed This Visit        Musculoskeletal and Integumentary    Neuroforaminal stenosis of cervical spine - Primary        -- Expected clinical course discussed    -- Medications and their side effects discussed    15 minutes spent with patient, with greater than 50% spent in counseling.    The 10-year ASCVD risk score (Chrisbrinda VILLA Jr, et al., 2013) is: 29.3%    Values used to calculate the score:      Age: 73 years      Sex: Male      Is Non- : No      Diabetic: No      Tobacco smoker: No      Systolic Blood Pressure: 144 mmHg      Is BP treated: No      HDL Cholesterol: 36 mg/dL      Total Cholesterol: 200 mg/dL    Patient  Instructions     Understanding Radiofrequency Denervation  Radiofrequency denervation is a treatment choice for some kinds of lower back and neck pain. It uses an electrical current created by radio waves. The radio waves make heat that destroys nerves along the spine that are causing pain. It s also known as radiofrequency lesioning, ablation, neurotomy, or rhizomotomy.  How to say it  RAY-antelmoTameraoh-FREE-nisha-see Zaynab   Why radiofrequency denervation is done  This treatment may be an option to reduce or stop lower back or neck pain that has lasted for a month or more. It can help treat pain caused by arthritis, normal wear and tear, disk disease, injury, and other problems. It s used when other treatment hasn t worked, such as medicine and physical therapy. It may be done after an injection of medicine into the nerve area to confirm that the nerve will respond to treatment.  How radiofrequency denervation is done  The procedure is done in a hospital or medical clinic. During the treatment:    You lie on your stomach. The area in your back or neck to be treated may be numbed. You may be given some medicine to help you relax.    The healthcare provider inserts a thin tube through the skin over the spine in your lower back or neck. He or she uses moving X-ray machine called a fluoroscope to help make sure the thin tube is in the right place. You may feel some discomfort.    When the tube is in place, the provider puts a wire through the tube. The wire is connected to a machine that sends radio waves through the wire. The radio waves heat the nerve and destroy it.    More than one nerve may need to be treated. You can go home 1 to 2 hours after the procedure.   You may feel more pain right after the treatment. The pain should then go away over 1 to 3 weeks. The pain relief may last for less than a month, or for 6 months or more. This depends on what is causing your pain, and how well this treatment can work  for it. It may help you be able to take less medicine for pain. The nerve will likely grow back. But it may not cause pain, or as much pain as before.  Risks of radiofrequency denervation    More pain after the procedure for a period of time    Mild burning feeling that may last up to 3 weeks    Numbness in the area that may last up to 4 months  Date Last Reviewed: 6/1/2016 2000-2018 [employer]. [address] [place] [address]. All rights reserved. This information is not intended as a substitute for professional medical care. Always follow your healthcare professional's instructions.    Return as needed for follow-up with Dr. Hampton.    Clinic : [phone]  Appointment line: [phone]      Marco A Hampton MD  Internal Medicine  [facility]     Portions of this note were dictated using speech recognition software. The note has been proofread but errors in the text may have been overlooked. Please contact me if there are any concerns regarding the accuracy of the dictation.

## 2018-11-29 ASSESSMENT — ANXIETY QUESTIONNAIRES: GAD7 TOTAL SCORE: 0

## 2018-12-06 NOTE — ADDENDUM NOTE
Encounter addended by: Coleman Kaufman, PT on: 12/6/2018 11:38 AM<BR>     Actions taken: Sign clinical note, Episode resolved

## 2018-12-06 NOTE — PROGRESS NOTES
Outpatient Physical Therapy Discharge Note     Patient: Lauro Pritchard  : 1945    Beginning/End Dates:  10/26/18 to 18    Referring Provider: Dr Hampton    Therapy Diagnosis: Chronic neck pain M54.2, G89.29      Plan:  Discharge from therapy.    Discharge:   Pt has not returned to physical therapy after a trial of self mgmt techniques indicating that he is doing well with HEP. Please refer to pt's chart for most recent information on objective measurements, goals or any additional information. This is an unplanned DC    Reason for Discharge: No further expectation of progress.  Patient chooses to discontinue therapy.    Discharge Plan: Patient to continue home program.

## 2018-12-11 ENCOUNTER — MYC MEDICAL ADVICE (OUTPATIENT)
Dept: INTERNAL MEDICINE | Facility: OTHER | Age: 73
End: 2018-12-11

## 2018-12-11 DIAGNOSIS — M54.2 CHRONIC NECK PAIN: Primary | ICD-10-CM

## 2018-12-11 DIAGNOSIS — G89.29 CHRONIC NECK PAIN: Primary | ICD-10-CM

## 2018-12-12 NOTE — TELEPHONE ENCOUNTER
[facility] representative offers the following suggestions, repeat facet injection and or order Transforaminal, Interlaminar.  Dr. Christina will be in later today and representative also suggests a conversation with him for further guidance.      Emmanuelle Austin LPN 12/12/2018 8:33 AM

## 2018-12-12 NOTE — TELEPHONE ENCOUNTER
Noted.  Please coordinate with [facility].  I will sign off on what ever the next step is.  They can send over orders.  Marco A Hampton MD

## 2018-12-13 ENCOUNTER — MYC MEDICAL ADVICE (OUTPATIENT)
Dept: INTERNAL MEDICINE | Facility: OTHER | Age: 73
End: 2018-12-13

## 2018-12-14 ENCOUNTER — TELEPHONE (OUTPATIENT)
Dept: INTERNAL MEDICINE | Facility: OTHER | Age: 73
End: 2018-12-14

## 2018-12-14 NOTE — TELEPHONE ENCOUNTER
Called patient back and let him know that Emmanuelle BROWN Had talked with [facility] and got some suggestions in further treatment for him and she routed her message to Dr. Hampton to review.  Let him know that Dr. Hampton had not responded to it yet and that he is not here today, but will be back on Monday.  Emmanuelle Fairbanks LPN .......12/14/2018 3:53 PM

## 2018-12-14 NOTE — TELEPHONE ENCOUNTER
Spoke with patient see new note from 12/14/18.  Emmanuelle Fairbanks LPN .......12/14/2018 3:54 PM

## 2018-12-18 ENCOUNTER — TELEPHONE (OUTPATIENT)
Dept: INTERNAL MEDICINE | Facility: OTHER | Age: 73
End: 2018-12-18

## 2018-12-18 NOTE — TELEPHONE ENCOUNTER
Called and spoke with patient. Notified pt that his message from last week will be addressed by PCP. We will notify him.     Ghislaine Srivastava LPN on 12/18/2018 at 9:56 AM

## 2019-01-04 ENCOUNTER — HOSPITAL ENCOUNTER (OUTPATIENT)
Dept: GENERAL RADIOLOGY | Facility: OTHER | Age: 74
Discharge: HOME | End: 2019-01-04
Attending: INTERNAL MEDICINE | Admitting: INTERNAL MEDICINE
Payer: COMMERCIAL

## 2019-01-04 DIAGNOSIS — M47.819 FACET ARTHROPATHY: ICD-10-CM

## 2019-01-04 PROCEDURE — 64490 INJ PARAVERT F JNT C/T 1 LEV: CPT | Mod: LT

## 2019-01-04 PROCEDURE — 25500064 ZZH RX 255 OP 636: Performed by: RADIOLOGY

## 2019-01-04 PROCEDURE — 25000128 H RX IP 250 OP 636: Performed by: RADIOLOGY

## 2019-01-04 PROCEDURE — 25000125 ZZHC RX 250: Performed by: RADIOLOGY

## 2019-01-04 RX ORDER — LIDOCAINE HYDROCHLORIDE 10 MG/ML
3 INJECTION, SOLUTION INFILTRATION; PERINEURAL ONCE
Status: COMPLETED | OUTPATIENT
Start: 2019-01-04 | End: 2019-01-04

## 2019-01-04 RX ORDER — BUPIVACAINE HYDROCHLORIDE 5 MG/ML
3 INJECTION, SOLUTION EPIDURAL; INTRACAUDAL ONCE
Status: COMPLETED | OUTPATIENT
Start: 2019-01-04 | End: 2019-01-04

## 2019-01-04 RX ADMIN — BUPIVACAINE HYDROCHLORIDE 3 ML: 5 INJECTION, SOLUTION EPIDURAL; INTRACAUDAL at 10:28

## 2019-01-04 RX ADMIN — IOHEXOL 3 ML: 240 INJECTION, SOLUTION INTRATHECAL; INTRAVASCULAR; INTRAVENOUS; ORAL at 10:28

## 2019-01-04 RX ADMIN — LIDOCAINE HYDROCHLORIDE 3 ML: 10 INJECTION, SOLUTION INFILTRATION; PERINEURAL at 10:28

## 2019-01-08 ENCOUNTER — MYC MEDICAL ADVICE (OUTPATIENT)
Dept: INTERNAL MEDICINE | Facility: OTHER | Age: 74
End: 2019-01-08

## 2019-01-08 NOTE — TELEPHONE ENCOUNTER
Is he having pain on the lower part, back of his head?      If so --- We could consider trying an occipital nerve block.    Marco A Hampton MD

## 2019-01-11 NOTE — PROGRESS NOTES
"Nursing Notes:   Emmanuelle Austin LPN  1/14/2019  8:45 AM  Signed  Patient presents to the clinic for follow up with chronic neck pain.    Chief Complaint   Patient presents with     Clinic Care Coordination - Follow-up       Initial /74 (BP Location: Right arm, Patient Position: Sitting, Cuff Size: Adult Regular)   Pulse 64   Ht 1.822 m (5' 11.75\")   Wt 91.2 kg (201 lb)   BMI 27.45 kg/m    Estimated body mass index is 27.45 kg/m  as calculated from the following:    Height as of this encounter: 1.822 m (5' 11.75\").    Weight as of this encounter: 91.2 kg (201 lb).  Medication Reconciliation: complete    Emmanuelle Austin LPN    Nursing note reviewed with patient.  Accuracy and completeness verified.   Mr. Pritchard is a 73 year old male who:  Patient presents with:  Clinic Care Coordination - Follow-up      ICD-10-CM    1. Chronic neck pain M54.2 NEUROSURGERY REFERRAL    G89.29 diclofenac (VOLTAREN) 1 % topical gel   2. Neuroforaminal stenosis of cervical spine M99.81 NEUROSURGERY REFERRAL     diclofenac (VOLTAREN) 1 % topical gel   3. Cervical stenosis of spinal canal M48.02 NEUROSURGERY REFERRAL     diclofenac (VOLTAREN) 1 % topical gel     HPI  Patient comes in for follow-up.  He recently had left-sided cervical medial branch block with minimal improvement in pain.    Most recent MRI showed moderate central canal stenosis, severe neuroforaminal stenosis.  He has chronic neck pain.  He is wondering about getting neurosurgical consult.    Certain movements or activities do worsen the pain.  Some positions improve the pain.  He has had pain for many years gradually worsening.  No bowel or bladder incontinence.  Has some neck muscle spasticity.  Diclofenac gel has been helpful.    Diclofenac gel has been helpful and would like a refill.  Prescription sent to pharmacy.    States he wants to find out if there is something that can be fixed or if he just has to learn to figure out how to live with the " pain.    Otherwise reports everything else is stable at this time.    Functional Capacity: about 4 METS.   Review of Systems   Constitutional: Negative for chills and fever.   HENT: Negative for congestion.    Eyes: Negative for visual disturbance.   Respiratory: Negative for chest tightness and shortness of breath.    Cardiovascular: Negative for chest pain.   Gastrointestinal: Negative for abdominal pain.   Genitourinary: Negative for hematuria.   Musculoskeletal: Positive for myalgias, neck pain and neck stiffness. Negative for back pain.   Skin: Negative for rash and wound.   Neurological: Negative for syncope.   Hematological: Negative for adenopathy. Does not bruise/bleed easily.   Psychiatric/Behavioral: Negative for confusion.      JOSE LUIS:   JOSE LUIS-7 SCORE 8/15/2018 11/28/2018 1/14/2019   Total Score 0 0 0     PHQ9:  PHQ-9 SCORE 8/15/2018 11/28/2018 1/14/2019   PHQ-9 Total Score 0 0 0       I have personally reviewed the past medical history, past surgical history, medications, allergies, family and social history as listed below, on 1/10/2019.    No Known Allergies    Current Outpatient Medications   Medication Sig Dispense Refill     diclofenac (VOLTAREN) 1 % topical gel Place 4 g onto the skin 4 times daily 2 Tube 3     omeprazole (PRILOSEC) 20 MG CR capsule Take 1 capsule (20 mg) by mouth daily Take 30 to 60 minutes prior to 1st meal of the day. 90 capsule 3     pravastatin (PRAVACHOL) 80 MG tablet Take 1 tablet (80 mg) by mouth At Bedtime 90 tablet 3     sertraline (ZOLOFT) 25 MG tablet Take 1 tablet (25 mg) by mouth daily 90 tablet 3        Patient Active Problem List    Diagnosis Date Noted     Cervical stenosis of spinal canal 01/14/2019     Priority: Medium     Neuroforaminal stenosis of cervical spine 11/28/2018     Priority: Medium     Sigmoid diverticulosis 09/17/2018     Priority: Medium     Chronic neck pain 08/15/2018     Priority: Medium     History of prediabetes 07/12/2017     Priority: Medium      Arthritis of right acromioclavicular joint 2016     Priority: Medium     Complete tear of right rotator cuff 2016     Priority: Medium     History of colon polyps, tubular adenoma x1, next scope 2015     Priority: Medium     Mixed hyperlipidemia 2014     Priority: Medium     Health care maintenance 2014     Priority: Medium     History of tobacco use 2014     Priority: Medium     History of anxiety 07/10/2013     Priority: Medium     Past Medical History:   Diagnosis Date     Anxiety disorder     lifetime     History of adenomatous polyp of colon     tubular adenoma x1, next scope      Past Surgical History:   Procedure Laterality Date     ARTHROSCOPY KNEE           COLONOSCOPY      ,follow up due  -- s/p polyp removal --> next in      COLONOSCOPY  09/10/2015    9/10/2015,20007.0,[place] COLONOSCOPY REMOVE JULIO POLYP LESN SNARE     COLONOSCOPY  2018    splenic flexure polyp     COLONOSCOPY N/A 2018    1 small tubular adenoma follow up      SHOULDER SURGERY Right      Social History     Socioeconomic History     Marital status:      Spouse name: Pamela     Number of children: None     Years of education: None     Highest education level: None   Social Needs     Financial resource strain: None     Food insecurity - worry: None     Food insecurity - inability: None     Transportation needs - medical: None     Transportation needs - non-medical: None   Occupational History     None   Tobacco Use     Smoking status: Former Smoker     Packs/day: 0.50     Years: 20.00     Pack years: 10.00     Types: Cigarettes     Last attempt to quit: 7/10/1985     Years since quittin.5     Smokeless tobacco: Never Used   Substance and Sexual Activity     Alcohol use: No     Alcohol/week: 7.0 oz     Frequency: Never     Drug use: No     Sexual activity: No   Other Topics Concern     Parent/sibling w/ CABG, MI or angioplasty before 65F 55M? Not Asked  "  Social History Narrative    , lives with wife, retired .     Family History   Problem Relation Age of Onset     Osteoporosis Mother         Osteoporosis     Genetic Disorder Other         Genetic,Mother - Diabetes.  No FHx of CVA, No early family HX of CAD, no known cancers       EXAM:   Vitals:    01/14/19 0838   BP: 130/74   BP Location: Right arm   Patient Position: Sitting   Cuff Size: Adult Regular   Pulse: 64   Weight: 91.2 kg (201 lb)   Height: 1.822 m (5' 11.75\")       Current Pain Score: Severe Pain (7)     BP Readings from Last 3 Encounters:   01/14/19 130/74   11/28/18 144/78   09/17/18 (!) 133/91      Wt Readings from Last 3 Encounters:   01/14/19 91.2 kg (201 lb)   11/28/18 90.4 kg (199 lb 6 oz)   09/17/18 87.9 kg (193 lb 12.8 oz)      Estimated body mass index is 27.45 kg/m  as calculated from the following:    Height as of this encounter: 1.822 m (5' 11.75\").    Weight as of this encounter: 91.2 kg (201 lb).     Physical Exam   Constitutional: He appears well-developed and well-nourished. No distress.   HENT:   Head: Normocephalic and atraumatic.   Eyes: Conjunctivae are normal. No scleral icterus.   Neck: Neck supple.   Cardiovascular: Normal rate and regular rhythm.   Pulmonary/Chest: Effort normal.   Musculoskeletal: He exhibits tenderness. He exhibits no deformity.   Lymphadenopathy:     He has no cervical adenopathy.   Neurological: He is alert.   Skin: Skin is warm and dry. No rash noted. He is not diaphoretic.   Psychiatric: He has a normal mood and affect.        Procedures   INVESTIGATIONS:  Results for orders placed or performed during the hospital encounter of 01/04/19   XR Medial Branch Block Cervical Left    Narrative    PROCEDURE: XR MEDIAL BRANCH BLOCK CERVICAL LEFT    HISTORY: Facet arthropathy, cervical.    COMPARISON: MR cervical spine 9/14/2018    TECHNIQUE: The risks and benefits of the branch blocks as well as  potential rhizotomy were discussed in detail with " the patient. Risks  associated medial branch block included infection, bleeding and  inadvertent radicular nerve block. Risks associated with rhizotomy  including infection, bleeding, radicular nerve root injury and,  transient increased pain. The patient expressed understanding and  willingness to proceed. Written and informed consent was obtained.    The patient was placed prone on the fluoroscopy table. The expected  locations of the medial branches of the dorsal nerves of C2, C3 and C4  on the left were identified. The overlying skin  was marked prepped  and draped in standard sterile fashion. Lidocaine was used to  anesthetize the skin entry sites. A 22-gauge needle was advanced under  fluoroscopic observation into the expected locations of the targeted  median nerves. Multiplanar fluoroscopic imaging and trace injection of  contrast confirmed our positioning. Subsequently, 0.8 cc of  bupivacaine were administered at each site. The patient tolerated the  procedure well. A total of .48 minutes of fluoroscopy time was used.    Prior to procedure, the pain was 8 out of 10 in severity. One hour  after, it was 40% improved.      Impression    IMPRESSION: Limited efficacy of left C2, C3 and C4 medial branch  blocks. R1.    CALOS SANDERS MD       ASSESSMENT AND PLAN:  Problem List Items Addressed This Visit        Nervous and Auditory    Chronic neck pain - Primary    Relevant Medications    diclofenac (VOLTAREN) 1 % topical gel    Other Relevant Orders    NEUROSURGERY REFERRAL       Musculoskeletal and Integumentary    Neuroforaminal stenosis of cervical spine    Relevant Medications    diclofenac (VOLTAREN) 1 % topical gel    Other Relevant Orders    NEUROSURGERY REFERRAL       Other    Cervical stenosis of spinal canal    Relevant Medications    diclofenac (VOLTAREN) 1 % topical gel    Other Relevant Orders    NEUROSURGERY REFERRAL        -- Expected clinical course discussed    -- Medications and their side  effects discussed    The 10-year ASCVD risk score ([place]brinda VILLA Jr., et al., 2013) is: 25.2%    Values used to calculate the score:      Age: 73 years      Sex: Male      Is Non- : No      Diabetic: No      Tobacco smoker: No      Systolic Blood Pressure: 130 mmHg      Is BP treated: No      HDL Cholesterol: 36 mg/dL      Total Cholesterol: 200 mg/dL    Patient Instructions   Sorry to hear the recent neck treatments did not help at all...      9/14/2018 - MR CERVICAL SPINE W/O CONTRAST     HISTORY: Chronic neck pain.     TECHNIQUE: Sagittal T1, T2 and STIR as well as axial T2 gradient and  3-D T2 images of the cervical spine were obtained.     COMPARISON: 8/20/2018     FINDINGS:       The cervical lordosis is relatively preserved. The vertebral body  heights are maintained. Focal endplate related marrow signal  degenerative changes are associated with focal disc height loss at  C2-3's, C5-6 and C6-7. No suspicious marrow lesion is identified.     The cervical cord has a normal caliber.  There are no areas of  abnormal cord signal.  No masses or fluid collections are seen in the  spinal canal or paravertebral soft tissues.  The craniocervical  junction is unremarkable.     C2-3: Moderate posterior disc osteophyte complex. Moderate left worse  than right uncovertebral and facet joint degenerative changes.  Moderate spinal stenosis. Mild to moderate left and mild right neural  foraminal stenosis.     C3-4: Shallow broad-based disc bulging. Mild uncovertebral and facet  joint degenerative changes. Mild spinal stenosis. Mild neural  foraminal stenosis.     C4-5: Shallow central and left paracentral disc protrusion. Moderate  uncovertebral and facet joint degenerative changes. Mild spinal  stenosis. Moderate neural foraminal stenosis.     C5-6: Moderate posterior disc osteophyte complex with a superimposed  central disc protrusion indenting the ventral cord. No associated  abnormal cord signal is seen.  Moderate to severe uncovertebral and  facet joint degenerative changes. Moderate spinal stenosis. Severe  neural foraminal stenosis.     C6-7: Moderate posterior disc osteophyte complex. Moderate to severe  uncovertebral and facet joint degenerative changes. Mild-to-moderate  spinal stenosis. Moderate to severe neural foraminal stenosis.     C7-T1: Mild facet joint degenerative changes.  No spinal stenosis. No  neural foraminal stenosis.                                                                      IMPRESSION:     Diffuse degenerative changes of the cervical spine results in moderate  spinal stenosis at C2-3 and C5-6. High-grade foraminal stenoses are  present at C5-6 and C6-7.     CALOS SANDERS MD      Neurosurgery referral sent  - they will call with date/time of appointment.      1. Chronic neck pain  2. Neuroforaminal stenosis of cervical spine  3. Cervical stenosis of spinal canal  - NEUROSURGERY REFERRAL    START:   - diclofenac (VOLTAREN) 1 % topical gel; Place 4 g onto the skin 4 times daily  Dispense: 2 Tube; Refill: 3    Return as needed for follow-up with Dr. Hampton.    Clinic : [phone]  Appointment line: [phone]      Marco A Hampton MD  Internal Medicine  [facility]     Portions of this note were dictated using speech recognition software. The note has been proofread but errors in the text may have been overlooked. Please contact me if there are any concerns regarding the accuracy of the dictation.

## 2019-01-14 ENCOUNTER — OFFICE VISIT (OUTPATIENT)
Dept: INTERNAL MEDICINE | Facility: OTHER | Age: 74
End: 2019-01-14
Attending: INTERNAL MEDICINE
Payer: COMMERCIAL

## 2019-01-14 VITALS
BODY MASS INDEX: 27.22 KG/M2 | DIASTOLIC BLOOD PRESSURE: 74 MMHG | HEIGHT: 72 IN | WEIGHT: 201 LBS | HEART RATE: 64 BPM | SYSTOLIC BLOOD PRESSURE: 130 MMHG

## 2019-01-14 DIAGNOSIS — G89.29 CHRONIC NECK PAIN: Primary | ICD-10-CM

## 2019-01-14 DIAGNOSIS — M54.2 CHRONIC NECK PAIN: Primary | ICD-10-CM

## 2019-01-14 DIAGNOSIS — M48.02 NEUROFORAMINAL STENOSIS OF CERVICAL SPINE: ICD-10-CM

## 2019-01-14 DIAGNOSIS — M48.02 CERVICAL STENOSIS OF SPINAL CANAL: ICD-10-CM

## 2019-01-14 PROCEDURE — G0463 HOSPITAL OUTPT CLINIC VISIT: HCPCS

## 2019-01-14 PROCEDURE — 99214 OFFICE O/P EST MOD 30 MIN: CPT | Performed by: INTERNAL MEDICINE

## 2019-01-14 SDOH — HEALTH STABILITY: MENTAL HEALTH: HOW OFTEN DO YOU HAVE A DRINK CONTAINING ALCOHOL?: NEVER

## 2019-01-14 ASSESSMENT — PATIENT HEALTH QUESTIONNAIRE - PHQ9
SUM OF ALL RESPONSES TO PHQ QUESTIONS 1-9: 0
5. POOR APPETITE OR OVEREATING: NOT AT ALL

## 2019-01-14 ASSESSMENT — ENCOUNTER SYMPTOMS
ABDOMINAL PAIN: 0
BRUISES/BLEEDS EASILY: 0
WOUND: 0
ADENOPATHY: 0
CHEST TIGHTNESS: 0
CONFUSION: 0
BACK PAIN: 0
NECK PAIN: 1
CHILLS: 0
FEVER: 0
MYALGIAS: 1
SHORTNESS OF BREATH: 0
HEMATURIA: 0
NECK STIFFNESS: 1

## 2019-01-14 ASSESSMENT — MIFFLIN-ST. JEOR: SCORE: 1690.76

## 2019-01-14 ASSESSMENT — ANXIETY QUESTIONNAIRES
2. NOT BEING ABLE TO STOP OR CONTROL WORRYING: NOT AT ALL
3. WORRYING TOO MUCH ABOUT DIFFERENT THINGS: NOT AT ALL
GAD7 TOTAL SCORE: 0
7. FEELING AFRAID AS IF SOMETHING AWFUL MIGHT HAPPEN: NOT AT ALL
6. BECOMING EASILY ANNOYED OR IRRITABLE: NOT AT ALL
1. FEELING NERVOUS, ANXIOUS, OR ON EDGE: NOT AT ALL
IF YOU CHECKED OFF ANY PROBLEMS ON THIS QUESTIONNAIRE, HOW DIFFICULT HAVE THESE PROBLEMS MADE IT FOR YOU TO DO YOUR WORK, TAKE CARE OF THINGS AT HOME, OR GET ALONG WITH OTHER PEOPLE: NOT DIFFICULT AT ALL
5. BEING SO RESTLESS THAT IT IS HARD TO SIT STILL: NOT AT ALL

## 2019-01-14 ASSESSMENT — PAIN SCALES - GENERAL: PAINLEVEL: SEVERE PAIN (7)

## 2019-01-14 NOTE — NURSING NOTE
"Patient presents to the clinic for follow up with chronic neck pain.    Chief Complaint   Patient presents with     Clinic Care Coordination - Follow-up       Initial /74 (BP Location: Right arm, Patient Position: Sitting, Cuff Size: Adult Regular)   Pulse 64   Ht 1.822 m (5' 11.75\")   Wt 91.2 kg (201 lb)   BMI 27.45 kg/m   Estimated body mass index is 27.45 kg/m  as calculated from the following:    Height as of this encounter: 1.822 m (5' 11.75\").    Weight as of this encounter: 91.2 kg (201 lb).  Medication Reconciliation: complete    Emmanuelle Austin LPN    "

## 2019-01-15 ASSESSMENT — ANXIETY QUESTIONNAIRES: GAD7 TOTAL SCORE: 0

## 2019-04-15 ENCOUNTER — DOCUMENTATION ONLY (OUTPATIENT)
Dept: OTHER | Facility: CLINIC | Age: 74
End: 2019-04-15

## 2019-04-25 ENCOUNTER — OFFICE VISIT (OUTPATIENT)
Dept: FAMILY MEDICINE | Facility: OTHER | Age: 74
End: 2019-04-25
Attending: NURSE PRACTITIONER
Payer: COMMERCIAL

## 2019-04-25 VITALS
RESPIRATION RATE: 20 BRPM | DIASTOLIC BLOOD PRESSURE: 70 MMHG | TEMPERATURE: 97.6 F | OXYGEN SATURATION: 96 % | SYSTOLIC BLOOD PRESSURE: 126 MMHG | WEIGHT: 200.38 LBS | BODY MASS INDEX: 28.05 KG/M2 | HEART RATE: 60 BPM | HEIGHT: 71 IN

## 2019-04-25 DIAGNOSIS — L29.89 PRURITIC ERYTHEMATOUS RASH: Primary | ICD-10-CM

## 2019-04-25 PROCEDURE — G0463 HOSPITAL OUTPT CLINIC VISIT: HCPCS | Performed by: NURSE PRACTITIONER

## 2019-04-25 PROCEDURE — 99213 OFFICE O/P EST LOW 20 MIN: CPT | Performed by: NURSE PRACTITIONER

## 2019-04-25 ASSESSMENT — MIFFLIN-ST. JEOR: SCORE: 1672.06

## 2019-04-25 ASSESSMENT — PAIN SCALES - GENERAL: PAINLEVEL: NO PAIN (0)

## 2019-04-25 NOTE — PROGRESS NOTES
HPI:    Lauro Pritchard is a 73 year old male  who presents to clinic today for rash.    Rash started on Monday (3 days ago).  Rash is on bilateral arms.  Rash has not spread.  Few spots on shoulders.  Rash is itching.  Rash is not painful.  He did stay at a hotel  night.  His wife stayed with him at the hotel but does not have a rash or any symptoms.  He denies any other symptoms - no fevers, no joint or muscle aches, no fatigue.  Feels fine other wise.  No new medications. No new products. No known tick bites.  No URI symptoms.    Using some anti itch cream with relief.  He is most concerned about finding out if the rash is contagious or infectious.  He is ok with dealing with the itching.  He is not taking any OTC antihistamine oral medication.        Past Medical History:   Diagnosis Date     Anxiety disorder     lifetime     History of adenomatous polyp of colon     tubular adenoma x1, next scope      Past Surgical History:   Procedure Laterality Date     ARTHROSCOPY KNEE           COLONOSCOPY      ,follow up due  -- s/p polyp removal --> next in 2018     COLONOSCOPY  09/10/2015    9/10/2015,51859.0,[place] COLONOSCOPY REMOVE JULIO POLYP LESN SNARE     COLONOSCOPY  2018    splenic flexure polyp     COLONOSCOPY N/A 2018    1 small tubular adenoma follow up      SHOULDER SURGERY Right 2016     Social History     Tobacco Use     Smoking status: Former Smoker     Packs/day: 0.50     Years: 20.00     Pack years: 10.00     Types: Cigarettes     Last attempt to quit: 7/10/1985     Years since quittin.8     Smokeless tobacco: Never Used   Substance Use Topics     Alcohol use: No     Alcohol/week: 7.0 oz     Frequency: Never     Current Outpatient Medications   Medication Sig Dispense Refill     omeprazole (PRILOSEC) 20 MG CR capsule Take 1 capsule (20 mg) by mouth daily Take 30 to 60 minutes prior to 1st meal of the day. 90 capsule 3     pravastatin (PRAVACHOL) 80 MG tablet Take 1  "tablet (80 mg) by mouth At Bedtime 90 tablet 3     sertraline (ZOLOFT) 25 MG tablet Take 1 tablet (25 mg) by mouth daily 90 tablet 3     No Known Allergies      Past medical history, past surgical history, current medications and allergies reviewed and accurate to the best of my knowledge.        ROS:  Refer to HPI    /70   Pulse 60   Temp 97.6  F (36.4  C) (Tympanic)   Resp 20   Ht 1.797 m (5' 10.75\")   Wt 90.9 kg (200 lb 6 oz)   SpO2 96%   BMI 28.14 kg/m      EXAM:  General Appearance: Well appearing young elderly male, appropriate appearance for age. No acute distress  Eyes: conjunctivae normal without erythema or irritation, no drainage or crusting, no eyelid swelling, pupils equal   Respiratory: normal chest wall and respirations.  Normal effort.  Clear to auscultation bilaterally, no wheezing, crackles or rhonchi.  No increased work of breathing.  No cough appreciated, oxygen saturation 96%  Cardiac: RRR with no murmurs  Musculoskeletal:  Normal gait.  Equal movement of bilateral upper extremities.  Equal movement of bilateral lower extremities.    Dermatological: bilateral arms with erythematous raised discrete circular papules, plaques, and lesions, mild excoriation, no crusting or bleeding, no surrounding erythema, no vesicles, no pustules, no bruising, no scaling, no fluctuance, no induration.  Psychological: normal affect, alert and pleasant            ASSESSMENT/PLAN:  1. Pruritic erythematous rash    Discussed rash is unlikely scabies or bed bugs as it appeared within hours of staying at hotel which is too soon for rash to appear from exposure to mites.  Likely an allergic or contact dermatitis reaction.  Rash is not clinically consistent with infectious or contagious nature.      Recommend trying Zyrtec 10 mg BID - as patient is not going to be taking Benadryl.      Cool or cold compresses PRN  Baking soda paste for 5 minutes then wash off - PRN    Continue anti-itch cream BID " PRN    Discussed warning signs/symptoms indicative of need to f/u    Follow up if symptoms persist or worsen or concerns

## 2019-04-25 NOTE — NURSING NOTE
"Chief Complaint   Patient presents with     Derm Problem     Itchy, red, spreading rash and welts on arms and shoulders since Monday 4/22/19     Stayed in a hotel in [place] 4/21/19-4/23/19.    Initial /70   Pulse 60   Temp 97.6  F (36.4  C) (Tympanic)   Resp 20   Ht 1.797 m (5' 10.75\")   Wt 90.9 kg (200 lb 6 oz)   SpO2 96%   BMI 28.14 kg/m   Estimated body mass index is 28.14 kg/m  as calculated from the following:    Height as of this encounter: 1.797 m (5' 10.75\").    Weight as of this encounter: 90.9 kg (200 lb 6 oz).    Medication Reconciliation: complete      Norma J. Gosselin, LPN  "

## 2019-05-30 NOTE — PROGRESS NOTES
"Nursing Notes:   Emmanuelle Austin LPN  5/31/2019  8:05 AM  Signed  Patient presents to the clinic for concerns about a possible boil of the low back and left side of the neck, these have been present for the past couple of months.    Chief Complaint   Patient presents with     Derm Problem       Initial /80 (BP Location: Right arm, Patient Position: Sitting, Cuff Size: Adult Regular)   Pulse 60   Temp 96.8  F (36  C) (Tympanic)   Resp 16   Wt 90.4 kg (199 lb 4 oz)   BMI 27.99 kg/m    Estimated body mass index is 27.99 kg/m  as calculated from the following:    Height as of 4/25/19: 1.797 m (5' 10.75\").    Weight as of this encounter: 90.4 kg (199 lb 4 oz).  Medication Reconciliation: complete    Emmanuelle Austin LPN    Nursing note reviewed with patient.  Accuracy and completeness verified.   Mr. Pritchard is a 73 year old male who:  Patient presents with:  Derm Problem      ICD-10-CM    1. Mixed hyperlipidemia E78.2 pravastatin (PRAVACHOL) 80 MG tablet   2. Cervical stenosis of spinal canal M48.02    3. Chronic neck pain M54.2     G89.29    4. 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 Special screening for malignant neoplasm of prostate Z12.5    6. Benign essential hypertension I10    7. Heartburn R12 omeprazole (PRILOSEC) 20 MG DR capsule   8. Anxiety F41.9 sertraline (ZOLOFT) 25 MG tablet   9. Multiple actinic keratoses x5 L57.0 DESTRUCT PREMALIGNANT LESION, 2-14     DESTRUCT PREMALIGNANT LESION, FIRST     HPI  Patient presents for annual follow-up.      Mixed hyperlipidemia, doing well with pravastatin.  No side effects reported.   last labs were controlled.  He declines dose increase   Or med change.  Declines labs today.  Refill sent to pharmacy.    Chronic neck pain.  Has cervical stenosis, neuroforaminal stenosis.  Moving his head he gets worse pain.  He met with 1 of the neurosurgeons in the past and they stated that once he starts having any arm weakness, arm numbness then they might do " something about it until that time they suggested getting a neck brace which he did not think would help so he never tried it.  He has been using ibuprofen which helps so he will continue with with this for now.    Prostate cancer screening, declined labs today.    Hypertension, currently well controlled.  Managed with diet.    Heartburn, doing well with omeprazole.  Needs refills.    Chronic anxiety, managed with Zoloft.  Needs refills.    Has a number of skin lesion.  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  Has not had skin cancer in the past.  Has 2 lesions that he is concerned about.  On exam actually has 5 lesions that are concerning.  x4 on back, x1 on left lower neck.     Evaluation shows actinic keratoses.  Treatment options reviewed and discussed.  He would like to proceed with cryotherapy.  See below.    Functional Capacity: about 4 METS.   Review of Systems   Constitutional: Negative for chills and fever.   HENT: Negative for congestion.    Eyes: Negative for visual disturbance.   Respiratory: Negative for chest tightness and shortness of breath.    Cardiovascular: Negative for chest pain.   Gastrointestinal: Negative for abdominal pain.   Genitourinary: Negative for hematuria.   Musculoskeletal: Positive for myalgias, neck pain (  + worse with neck rotation, improved with ibuprofen) and neck stiffness. Negative for back pain.   Skin: Positive for rash (  + skin lesions on back, left neck). Negative for wound.   Neurological: Negative for syncope.   Hematological: Negative for adenopathy. Does not bruise/bleed easily.   Psychiatric/Behavioral: Negative for confusion.        JOSE LUIS:   JOSE LUIS-7 SCORE 11/28/2018 1/14/2019 5/31/2019   Total Score 0 0 0     PHQ9:  PHQ-9 SCORE 11/28/2018 1/14/2019 5/31/2019   PHQ-9 Total Score 0 0 0       I have personally reviewed the past medical history, past surgical history, medications, allergies, family and social history as listed below.     No Known  Allergies    Current Outpatient Medications   Medication Sig Dispense Refill     omeprazole (PRILOSEC) 20 MG DR capsule Take 1 capsule (20 mg) by mouth daily Take 30 to 60 minutes prior to 1st meal of the day. 90 capsule 3     pravastatin (PRAVACHOL) 80 MG tablet Take 1 tablet (80 mg) by mouth At Bedtime 90 tablet 3     sertraline (ZOLOFT) 25 MG tablet Take 1 tablet (25 mg) by mouth daily 90 tablet 3        Patient Active Problem List    Diagnosis Date Noted     Cervical stenosis of spinal canal 01/14/2019     Priority: Medium     Neuroforaminal stenosis of cervical spine 11/28/2018     Priority: Medium     Sigmoid diverticulosis 09/17/2018     Priority: Medium     Chronic neck pain 08/15/2018     Priority: Medium     History of prediabetes 07/12/2017     Priority: Medium     Arthritis of right acromioclavicular joint 12/21/2016     Priority: Medium     Complete tear of right rotator cuff 12/21/2016     Priority: Medium     History of colon polyps, tubular adenoma x1, next scope 2028 04/22/2015     Priority: Medium     Mixed hyperlipidemia 09/17/2014     Priority: Medium     Health care maintenance 09/11/2014     Priority: Medium     History of tobacco use 09/11/2014     Priority: Medium     History of anxiety 07/10/2013     Priority: Medium     Past Medical History:   Diagnosis Date     Anxiety disorder     lifetime     History of adenomatous polyp of colon     tubular adenoma x1, next scope 2028     Past Surgical History:   Procedure Laterality Date     ARTHROSCOPY KNEE      2015     COLONOSCOPY      2010,follow up due 2015 -- s/p polyp removal --> next in 2018     COLONOSCOPY  09/10/2015    9/10/2015,90924.0,[place] COLONOSCOPY REMOVE JULIO POLYP LESN SNARE     COLONOSCOPY  09/17/2018    splenic flexure polyp     COLONOSCOPY N/A 9/17/2018    1 small tubular adenoma follow up 2028     SHOULDER SURGERY Right 2016     Social History     Socioeconomic History     Marital status:      Spouse name: Pamela     Benny of  children: None     Years of education: None     Highest education level: None   Occupational History     None   Social Needs     Financial resource strain: None     Food insecurity:     Worry: None     Inability: None     Transportation needs:     Medical: None     Non-medical: None   Tobacco Use     Smoking status: Former Smoker     Packs/day: 0.50     Years: 20.00     Pack years: 10.00     Types: Cigarettes     Last attempt to quit: 7/10/1985     Years since quittin.9     Smokeless tobacco: Never Used   Substance and Sexual Activity     Alcohol use: No     Alcohol/week: 7.0 oz     Frequency: Never     Drug use: No     Sexual activity: Never   Lifestyle     Physical activity:     Days per week: None     Minutes per session: None     Stress: None   Relationships     Social connections:     Talks on phone: None     Gets together: None     Attends Congregational service: None     Active member of club or organization: None     Attends meetings of clubs or organizations: None     Relationship status: None     Intimate partner violence:     Fear of current or ex partner: None     Emotionally abused: None     Physically abused: None     Forced sexual activity: None   Other Topics Concern     Parent/sibling w/ CABG, MI or angioplasty before 65F 55M? Not Asked   Social History Narrative    , lives with wife, retired .     Family History   Problem Relation Age of Onset     Osteoporosis Mother         Osteoporosis     Genetic Disorder Other         Genetic,Mother - Diabetes.  No FHx of CVA, No early family HX of CAD, no known cancers       EXAM:   Vitals:    19 0758   BP: 124/80   BP Location: Right arm   Patient Position: Sitting   Cuff Size: Adult Regular   Pulse: 60   Resp: 16   Temp: 96.8  F (36  C)   TempSrc: Tympanic   Weight: 90.4 kg (199 lb 4 oz)       Current Pain Score: No Pain (0)     BP Readings from Last 3 Encounters:   19 124/80   19 126/70   19 130/74      Wt Readings  "from Last 3 Encounters:   05/31/19 90.4 kg (199 lb 4 oz)   04/25/19 90.9 kg (200 lb 6 oz)   01/14/19 91.2 kg (201 lb)      Estimated body mass index is 27.99 kg/m  as calculated from the following:    Height as of 4/25/19: 1.797 m (5' 10.75\").    Weight as of this encounter: 90.4 kg (199 lb 4 oz).     Physical Exam   Constitutional: He appears well-developed and well-nourished. No distress.   HENT:   Head: Normocephalic and atraumatic.   Eyes: Conjunctivae are normal. No scleral icterus.   Neck: Neck supple.   Cardiovascular: Normal rate and regular rhythm.   Pulmonary/Chest: Effort normal.   Musculoskeletal: He exhibits tenderness (  neck stiffness/muscle tenderness to palpation). He exhibits no deformity.   Lymphadenopathy:     He has no cervical adenopathy.   Neurological: He is alert.   Skin: Skin is warm and dry. Rash noted. He is not diaphoretic.   5 skin lesions on back, left neck - actinic keratoses noted.     PROCEDURE:   Reviewed risks and benefits of cryotherapy.After informed consent was obtained, patient elected to proceed. These 5 lesions were treated today.   Performed cryotherapy to #5 lesions x 2 rounds of 30 second freeze/thaw cycles.   Tolerated well. No obvious complications.   Return for signs of infection.    The patient and I reviewed safe sun practices today: the importance of staying out of the sun;especially between 10AM-2PM, wearing sun screen SPF > 30, and protective clothing.   We also discussed the ABC's of skin cancers including: changes in size, uniformity, borders, coloration, bleeding, or any irregularityor changes. If these occur, seek medical attention.   The patient should have a complete skin exam by a medical professional every 6-12 months, and any questionable/suspicious, or changing lesions should be removed.     Psychiatric: He has a normal mood and affect.        Procedures   INVESTIGATIONS:  Results for orders placed or performed during the hospital encounter of 01/04/19 "   XR Medial Branch Block Cervical Left    Narrative    PROCEDURE: XR MEDIAL BRANCH BLOCK CERVICAL LEFT    HISTORY: Facet arthropathy, cervical.    COMPARISON: MR cervical spine 9/14/2018    TECHNIQUE: The risks and benefits of the branch blocks as well as  potential rhizotomy were discussed in detail with the patient. Risks  associated medial branch block included infection, bleeding and  inadvertent radicular nerve block. Risks associated with rhizotomy  including infection, bleeding, radicular nerve root injury and,  transient increased pain. The patient expressed understanding and  willingness to proceed. Written and informed consent was obtained.    The patient was placed prone on the fluoroscopy table. The expected  locations of the medial branches of the dorsal nerves of C2, C3 and C4  on the left were identified. The overlying skin  was marked prepped  and draped in standard sterile fashion. Lidocaine was used to  anesthetize the skin entry sites. A 22-gauge needle was advanced under  fluoroscopic observation into the expected locations of the targeted  median nerves. Multiplanar fluoroscopic imaging and trace injection of  contrast confirmed our positioning. Subsequently, 0.8 cc of  bupivacaine were administered at each site. The patient tolerated the  procedure well. A total of .48 minutes of fluoroscopy time was used.    Prior to procedure, the pain was 8 out of 10 in severity. One hour  after, it was 40% improved.      Impression    IMPRESSION: Limited efficacy of left C2, C3 and C4 medial branch  blocks. R1.    CALOS SANDERS MD       ASSESSMENT AND PLAN:  Problem List Items Addressed This Visit        Nervous and Auditory    Chronic neck pain       Endocrine    Mixed hyperlipidemia - Primary    Relevant Medications    pravastatin (PRAVACHOL) 80 MG tablet       Musculoskeletal and Integumentary    Neuroforaminal stenosis of cervical spine       Other    Cervical stenosis of spinal canal      Other  Visit Diagnoses     Special screening for malignant neoplasm of prostate        Benign essential hypertension        Heartburn        Relevant Medications    omeprazole (PRILOSEC) 20 MG DR capsule    Anxiety        Relevant Medications    sertraline (ZOLOFT) 25 MG tablet    Multiple actinic keratoses x5        Relevant Orders    DESTRUCT PREMALIGNANT LESION, 2-14 (Completed)    DESTRUCT PREMALIGNANT LESION, FIRST (Completed)        reviewed diet, exercise and weight control  -- Expected clinical course discussed    -- Medications and their side effects discussed    The 10-year ASCVD risk score (Chris VILLA Jr., et al., 2013) is: 23.4%    Values used to calculate the score:      Age: 73 years      Sex: Male      Is Non- : No      Diabetic: No      Tobacco smoker: No      Systolic Blood Pressure: 124 mmHg      Is BP treated: No      HDL Cholesterol: 36 mg/dL      Total Cholesterol: 200 mg/dL    There are no Patient Instructions on file for this visit.  Marco A Hampton MD  Internal Medicine  [facility]     Portions of this note were dictated using speech recognition software. The note has been proofread but errors in the text may have been overlooked. Please contact me if there are any concerns regarding the accuracy of the dictation.

## 2019-05-31 ENCOUNTER — OFFICE VISIT (OUTPATIENT)
Dept: INTERNAL MEDICINE | Facility: OTHER | Age: 74
End: 2019-05-31
Attending: INTERNAL MEDICINE
Payer: COMMERCIAL

## 2019-05-31 VITALS
TEMPERATURE: 96.8 F | HEART RATE: 60 BPM | DIASTOLIC BLOOD PRESSURE: 80 MMHG | SYSTOLIC BLOOD PRESSURE: 124 MMHG | BODY MASS INDEX: 27.99 KG/M2 | RESPIRATION RATE: 16 BRPM | WEIGHT: 199.25 LBS

## 2019-05-31 DIAGNOSIS — M54.2 CHRONIC NECK PAIN: ICD-10-CM

## 2019-05-31 DIAGNOSIS — M48.02 NEUROFORAMINAL STENOSIS OF CERVICAL SPINE: ICD-10-CM

## 2019-05-31 DIAGNOSIS — R12 HEARTBURN: ICD-10-CM

## 2019-05-31 DIAGNOSIS — L57.0 MULTIPLE ACTINIC KERATOSES: ICD-10-CM

## 2019-05-31 DIAGNOSIS — M48.02 CERVICAL STENOSIS OF SPINAL CANAL: ICD-10-CM

## 2019-05-31 DIAGNOSIS — Z12.5 SPECIAL SCREENING FOR MALIGNANT NEOPLASM OF PROSTATE: ICD-10-CM

## 2019-05-31 DIAGNOSIS — I10 BENIGN ESSENTIAL HYPERTENSION: ICD-10-CM

## 2019-05-31 DIAGNOSIS — E78.2 MIXED HYPERLIPIDEMIA: Primary | ICD-10-CM

## 2019-05-31 DIAGNOSIS — G89.29 CHRONIC NECK PAIN: ICD-10-CM

## 2019-05-31 DIAGNOSIS — F41.9 ANXIETY: ICD-10-CM

## 2019-05-31 PROCEDURE — 99214 OFFICE O/P EST MOD 30 MIN: CPT | Mod: 25 | Performed by: INTERNAL MEDICINE

## 2019-05-31 PROCEDURE — 17003 DESTRUCT PREMALG LES 2-14: CPT | Performed by: INTERNAL MEDICINE

## 2019-05-31 PROCEDURE — G0463 HOSPITAL OUTPT CLINIC VISIT: HCPCS | Mod: 25

## 2019-05-31 PROCEDURE — G0463 HOSPITAL OUTPT CLINIC VISIT: HCPCS

## 2019-05-31 PROCEDURE — 17000 DESTRUCT PREMALG LESION: CPT | Performed by: INTERNAL MEDICINE

## 2019-05-31 RX ORDER — PRAVASTATIN SODIUM 80 MG/1
80 TABLET ORAL AT BEDTIME
Qty: 90 TABLET | Refills: 3 | Status: SHIPPED | OUTPATIENT
Start: 2019-05-31 | End: 2020-09-11

## 2019-05-31 RX ORDER — SERTRALINE HYDROCHLORIDE 25 MG/1
25 TABLET, FILM COATED ORAL DAILY
Qty: 90 TABLET | Refills: 3 | Status: SHIPPED | OUTPATIENT
Start: 2019-05-31 | End: 2020-09-11

## 2019-05-31 ASSESSMENT — ENCOUNTER SYMPTOMS
WOUND: 0
MYALGIAS: 1
ABDOMINAL PAIN: 0
BACK PAIN: 0
SHORTNESS OF BREATH: 0
CHILLS: 0
BRUISES/BLEEDS EASILY: 0
HEMATURIA: 0
NECK STIFFNESS: 1
CONFUSION: 0
ADENOPATHY: 0
FEVER: 0
CHEST TIGHTNESS: 0
NECK PAIN: 1

## 2019-05-31 ASSESSMENT — ANXIETY QUESTIONNAIRES
6. BECOMING EASILY ANNOYED OR IRRITABLE: NOT AT ALL
3. WORRYING TOO MUCH ABOUT DIFFERENT THINGS: NOT AT ALL
7. FEELING AFRAID AS IF SOMETHING AWFUL MIGHT HAPPEN: NOT AT ALL
2. NOT BEING ABLE TO STOP OR CONTROL WORRYING: NOT AT ALL
GAD7 TOTAL SCORE: 0
5. BEING SO RESTLESS THAT IT IS HARD TO SIT STILL: NOT AT ALL
1. FEELING NERVOUS, ANXIOUS, OR ON EDGE: NOT AT ALL
IF YOU CHECKED OFF ANY PROBLEMS ON THIS QUESTIONNAIRE, HOW DIFFICULT HAVE THESE PROBLEMS MADE IT FOR YOU TO DO YOUR WORK, TAKE CARE OF THINGS AT HOME, OR GET ALONG WITH OTHER PEOPLE: NOT DIFFICULT AT ALL

## 2019-05-31 ASSESSMENT — PATIENT HEALTH QUESTIONNAIRE - PHQ9
SUM OF ALL RESPONSES TO PHQ QUESTIONS 1-9: 0
5. POOR APPETITE OR OVEREATING: NOT AT ALL

## 2019-05-31 ASSESSMENT — PAIN SCALES - GENERAL: PAINLEVEL: NO PAIN (0)

## 2019-05-31 NOTE — PATIENT INSTRUCTIONS
Blood pressures are well controlled.   Medications refilled.   Last labs looked good.     What to Expect Following Cryosurgery (Liquid Nitrogen)   What is Cryosurgery?   Cryosurgery is a technique for removing skin lesions that primarily involve the surface of the skin, such as warts, seborrheic keratosis, or actinic keratosis. It is a quick method of removing the lesion with minimal scarring.   The liquid nitrogen needs to be applied long enough tofreeze the affected skin. By freezing the skin, a blister is created underneath the lesion. Ideally, as the new skin forms underneath the blister, the abnormal skin on the roof of the blister peels off. Occasionally, if thelesion is very thick (such as a large wart), only the surface is blistered off. The base or residual lesion may need to be frozen at another visit.   It takes about one to two weeks for the scab to fall off, which is whenthe new layer of skin has formed under the blister. Areas of thinner skin, such as the face, may heal a little faster.      What to Expect Over the Next Few Weeks     During Treatment - Area beingtreated will sting, burn, and then possibly itch.     Immediately After Treatment - Area will be red, sore, and swollen.     Next Day - Blister or blood blister has formed, tenderness starts to subside. Apply aBand-Aid if   necessary.     7 Days - Surface is dark red/brown and scab-like. Apply Vaseline  or an antibacterial ointment, such as Polysporin , if necessary.     2 to 4 Weeks - The surface startsto peel off. This may be encouraged gently during bathing, when the scab is softened.     No makeup shouldbe applied until area is fully healed.      Howto Take Care of the Skin after Cryosurgery     ABand-Aid can be used for larger blisters or blisters in areas that are more likely to be traumatized -such as fingers and toes. If the area becomes dry or crusted, an ointment (Vaseline , Bacitracin , or Polysporin ) can also be applied.      Cleanse area with a mild cleanser and cool water.     Pat the area dry with a lint-free cloth and apply an ointment (Vaseline , Bacitracin , or Polysporin ).     Avoid glycolic acids, Vitamin C, scrubs, Tretinoin (Retin-A), and Retinol creams for 7 to 10 days.     If approved by your Provider, you may bathe, swim, exercise, and otherwise follow all of your normalactivities.     The area may get wet while bathing, but swimming or hot tub use should be avoided for oneweek following a treatment or while the skin is open.     Within 24 hours, you can expect the area to beswollen and/or blistered. The blister may not be visible to the naked eye.     Within one week, theswelling goes down. The top becomes dark red and scab-like. The scab will loosen over the next weeks, and should fall off within one month.      Adverse Effects     The most common adverse effects are pain, swelling/blistering, potential for infection, and pale discoloration of the skin after it heals.      Blisters        Anytime a blister surfaces, whether from ill-fitting shoes, an oven burn, or liquid nitrogen cryosurgery, it will be a bit painful. For most patients, the pain is a temporary stingwith some discomfort periodically over the next day as the blister forms.     The goal is to achieve ablister. This means, most commonly, patients will have a blister form following treatment. Sometimes, the blister is so thin that it can't be seen and may have minimal swelling. Occasionally, a blood blister forms that canbe quite dramatic but is harmless.     Rarely, the blister may become infected. When this happens, theblister becomes unusually tender, the fluid becomes cloudy, and the redness around it becomes more extensive (and may even form streaks). If this happens, contact our office.     Some lesions, especially those on theface, may leave a slight pale discoloration.     True scarring,involving deeper layers of the skin is unlikely.        Return in  approximately 1 year, or sooner as needed for follow-up with Dr. Hampton.  - Annual Follow-up    Clinic : [phone]  Appointment line: [phone]

## 2019-05-31 NOTE — NURSING NOTE
"Patient presents to the clinic for concerns about a possible boil of the low back and left side of the neck, these have been present for the past couple of months.    Chief Complaint   Patient presents with     Derm Problem       Initial /80 (BP Location: Right arm, Patient Position: Sitting, Cuff Size: Adult Regular)   Pulse 60   Temp 96.8  F (36  C) (Tympanic)   Resp 16   Wt 90.4 kg (199 lb 4 oz)   BMI 27.99 kg/m   Estimated body mass index is 27.99 kg/m  as calculated from the following:    Height as of 4/25/19: 1.797 m (5' 10.75\").    Weight as of this encounter: 90.4 kg (199 lb 4 oz).  Medication Reconciliation: complete    Emmanuelle Austin LPN    "

## 2019-06-01 ASSESSMENT — ANXIETY QUESTIONNAIRES: GAD7 TOTAL SCORE: 0

## 2019-08-20 NOTE — ANESTHESIA PREPROCEDURE EVALUATION
Anesthesia Evaluation     . Pt has had prior anesthetic. Type: MAC and General    No history of anesthetic complications          ROS/MED HX    ENT/Pulmonary:  - neg pulmonary ROS     Neurologic:  - neg neurologic ROS     Cardiovascular:  - neg cardiovascular ROS       METS/Exercise Tolerance:  >4 METS   Hematologic:  - neg hematologic  ROS       Musculoskeletal:  - neg musculoskeletal ROS       GI/Hepatic:     (+) GERD Asymptomatic on medication,       Renal/Genitourinary:  - ROS Renal section negative       Endo:  - neg endo ROS       Psychiatric:  - neg psychiatric ROS       Infectious Disease:  - neg infectious disease ROS       Malignancy:      - no malignancy   Other:    (+) No chance of pregnancy C-spine cleared: N/A, no H/O Chronic Pain,no other significant disability   - neg other ROS                 Physical Exam  Normal systems: cardiovascular, pulmonary and dental    Airway   Mallampati: I  TM distance: >3 FB  Neck ROM: full    Dental     Cardiovascular   Rhythm and rate: regular and normal      Pulmonary    breath sounds clear to auscultation                    Anesthesia Plan      History & Physical Review      ASA Status:  2 .    NPO Status:  > 6 hours    Plan for MAC with Propofol induction.          Postoperative Care      Consents  Anesthetic plan, risks, benefits and alternatives discussed with:  Patient.  Use of blood products discussed: No .   .                          .   "Nitza Khanna presents for initial post-operative visit following a left total hip arthroplasty performed by Dr. Lopez on 7/9/2019.  Tolerating pain medication well.     Exam:  Height 5' 5" (1.651 m), weight 102 kg (224 lb 13.9 oz).   Patient is ambulating well without assistive device.   Incision is completely healed.      Post-operative radiographs reviewed today revealing satisfactory position of the prosthesis.    A/P  6 weeks s/p left total hip replacement  - outpatient PT ongoing at [facility] location  - Pain medication refill not needed  - Follow up in 6 weeks with Dr. Lopez. Pt will call clinic immediately with problems/concerns.     "

## 2019-08-28 ENCOUNTER — OFFICE VISIT (OUTPATIENT)
Dept: INTERNAL MEDICINE | Facility: OTHER | Age: 74
End: 2019-08-28
Attending: INTERNAL MEDICINE
Payer: COMMERCIAL

## 2019-08-28 VITALS
RESPIRATION RATE: 16 BRPM | HEIGHT: 71 IN | HEART RATE: 62 BPM | WEIGHT: 194.6 LBS | SYSTOLIC BLOOD PRESSURE: 120 MMHG | BODY MASS INDEX: 27.24 KG/M2 | TEMPERATURE: 96.7 F | DIASTOLIC BLOOD PRESSURE: 82 MMHG

## 2019-08-28 DIAGNOSIS — E78.2 MIXED HYPERLIPIDEMIA: ICD-10-CM

## 2019-08-28 DIAGNOSIS — G89.29 CHRONIC NECK PAIN: ICD-10-CM

## 2019-08-28 DIAGNOSIS — M54.2 CHRONIC NECK PAIN: ICD-10-CM

## 2019-08-28 DIAGNOSIS — M48.02 CERVICAL STENOSIS OF SPINAL CANAL: ICD-10-CM

## 2019-08-28 DIAGNOSIS — Z12.5 SCREENING PSA (PROSTATE SPECIFIC ANTIGEN): ICD-10-CM

## 2019-08-28 DIAGNOSIS — I10 BENIGN ESSENTIAL HYPERTENSION: Primary | ICD-10-CM

## 2019-08-28 LAB
ALBUMIN SERPL-MCNC: 4.1 G/DL (ref 3.5–5.7)
ALP SERPL-CCNC: 48 U/L (ref 34–104)
ALT SERPL W P-5'-P-CCNC: 12 U/L (ref 7–52)
ANION GAP SERPL CALCULATED.3IONS-SCNC: 7 MMOL/L (ref 3–14)
AST SERPL W P-5'-P-CCNC: 18 U/L (ref 13–39)
BASOPHILS # BLD AUTO: 0 10E9/L (ref 0–0.2)
BASOPHILS NFR BLD AUTO: 0.5 %
BILIRUB SERPL-MCNC: 0.7 MG/DL (ref 0.3–1)
BUN SERPL-MCNC: 15 MG/DL (ref 7–25)
CALCIUM SERPL-MCNC: 8.9 MG/DL (ref 8.6–10.3)
CHLORIDE SERPL-SCNC: 104 MMOL/L (ref 98–107)
CHOLEST SERPL-MCNC: 173 MG/DL
CO2 SERPL-SCNC: 27 MMOL/L (ref 21–31)
CREAT SERPL-MCNC: 0.85 MG/DL (ref 0.7–1.3)
DIFFERENTIAL METHOD BLD: NORMAL
EOSINOPHIL # BLD AUTO: 0.3 10E9/L (ref 0–0.7)
EOSINOPHIL NFR BLD AUTO: 4.1 %
ERYTHROCYTE [DISTWIDTH] IN BLOOD BY AUTOMATED COUNT: 13.5 % (ref 10–15)
GFR SERPL CREATININE-BSD FRML MDRD: 88 ML/MIN/{1.73_M2}
GLUCOSE SERPL-MCNC: 94 MG/DL (ref 70–105)
HCT VFR BLD AUTO: 42.8 % (ref 40–53)
HDLC SERPL-MCNC: 47 MG/DL (ref 23–92)
HGB BLD-MCNC: 14.6 G/DL (ref 13.3–17.7)
IMM GRANULOCYTES # BLD: 0 10E9/L (ref 0–0.4)
IMM GRANULOCYTES NFR BLD: 0.2 %
LDLC SERPL CALC-MCNC: 102 MG/DL
LYMPHOCYTES # BLD AUTO: 1.9 10E9/L (ref 0.8–5.3)
LYMPHOCYTES NFR BLD AUTO: 30.2 %
MCH RBC QN AUTO: 32.3 PG (ref 26.5–33)
MCHC RBC AUTO-ENTMCNC: 34.1 G/DL (ref 31.5–36.5)
MCV RBC AUTO: 95 FL (ref 78–100)
MONOCYTES # BLD AUTO: 0.7 10E9/L (ref 0–1.3)
MONOCYTES NFR BLD AUTO: 10.6 %
NEUTROPHILS # BLD AUTO: 3.5 10E9/L (ref 1.6–8.3)
NEUTROPHILS NFR BLD AUTO: 54.4 %
NONHDLC SERPL-MCNC: 126 MG/DL
PLATELET # BLD AUTO: 185 10E9/L (ref 150–450)
POTASSIUM SERPL-SCNC: 3.9 MMOL/L (ref 3.5–5.1)
PROT SERPL-MCNC: 7.3 G/DL (ref 6.4–8.9)
PSA SERPL-ACNC: 1.19 NG/ML
RBC # BLD AUTO: 4.52 10E12/L (ref 4.4–5.9)
SODIUM SERPL-SCNC: 138 MMOL/L (ref 134–144)
TRIGL SERPL-MCNC: 121 MG/DL
WBC # BLD AUTO: 6.4 10E9/L (ref 4–11)

## 2019-08-28 PROCEDURE — 99213 OFFICE O/P EST LOW 20 MIN: CPT | Performed by: INTERNAL MEDICINE

## 2019-08-28 PROCEDURE — 80053 COMPREHEN METABOLIC PANEL: CPT | Mod: ZL | Performed by: INTERNAL MEDICINE

## 2019-08-28 PROCEDURE — 85025 COMPLETE CBC W/AUTO DIFF WBC: CPT | Mod: ZL | Performed by: INTERNAL MEDICINE

## 2019-08-28 PROCEDURE — 80061 LIPID PANEL: CPT | Mod: ZL | Performed by: INTERNAL MEDICINE

## 2019-08-28 PROCEDURE — G0463 HOSPITAL OUTPT CLINIC VISIT: HCPCS

## 2019-08-28 PROCEDURE — G0103 PSA SCREENING: HCPCS | Mod: ZL | Performed by: INTERNAL MEDICINE

## 2019-08-28 PROCEDURE — 36415 COLL VENOUS BLD VENIPUNCTURE: CPT | Mod: ZL | Performed by: INTERNAL MEDICINE

## 2019-08-28 ASSESSMENT — ENCOUNTER SYMPTOMS
CHILLS: 0
FEVER: 0
MYALGIAS: 1
NECK PAIN: 1
CONFUSION: 0
SHORTNESS OF BREATH: 0
CHEST TIGHTNESS: 0
ABDOMINAL PAIN: 0
WOUND: 0
BACK PAIN: 0
ADENOPATHY: 0
HEMATURIA: 0
BRUISES/BLEEDS EASILY: 0
NECK STIFFNESS: 1

## 2019-08-28 ASSESSMENT — PAIN SCALES - GENERAL: PAINLEVEL: EXTREME PAIN (8)

## 2019-08-28 ASSESSMENT — MIFFLIN-ST. JEOR: SCORE: 1640.86

## 2019-08-28 NOTE — NURSING NOTE
Chief Complaint   Patient presents with     Physical   Patient presents to the clinic for physical    Medication Reconciliation: completed   Frieda Gaines LPN  8/28/2019 2:28 PM

## 2019-08-28 NOTE — LETTER
August 28, 2019      Lauro REES Francheska  [id number] [facility] [address]        Dear ,    We are writing to inform you of your test results.    Labs look good.  Continue current medications.    Resulted Orders   PSA Screen GH   Result Value Ref Range    PSA Screen 1.192 <3.100 ng/mL      Comment:      The DXI Access PSAS WHO assay is a two site immunoenzymatic assay. Assay   values obtained with different assay methods cannot be used interchangeably   due to differences in assay methods and reagent specificity.     Lipid Panel   Result Value Ref Range    Cholesterol 173 <200 mg/dL    Triglycerides 121 <150 mg/dL    HDL Cholesterol 47 23 - 92 mg/dL    LDL Cholesterol Calculated 102 (H) <100 mg/dL      Comment:      Above desirable:  100-129 mg/dl  Borderline High:  130-159 mg/dL  High:             160-189 mg/dL  Very high:       >189 mg/dl      Non HDL Cholesterol 126 <130 mg/dL   CBC and Differential   Result Value Ref Range    WBC 6.4 4.0 - 11.0 10e9/L    RBC Count 4.52 4.4 - 5.9 10e12/L    Hemoglobin 14.6 13.3 - 17.7 g/dL    Hematocrit 42.8 40.0 - 53.0 %    MCV 95 78 - 100 fl    MCH 32.3 26.5 - 33.0 pg    MCHC 34.1 31.5 - 36.5 g/dL    RDW 13.5 10.0 - 15.0 %    Platelet Count 185 150 - 450 10e9/L    Diff Method Automated Method     % Neutrophils 54.4 %    % Lymphocytes 30.2 %    % Monocytes 10.6 %    % Eosinophils 4.1 %    % Basophils 0.5 %    % Immature Granulocytes 0.2 %    Absolute Neutrophil 3.5 1.6 - 8.3 10e9/L    Absolute Lymphocytes 1.9 0.8 - 5.3 10e9/L    Absolute Monocytes 0.7 0.0 - 1.3 10e9/L    Absolute Eosinophils 0.3 0.0 - 0.7 10e9/L    Absolute Basophils 0.0 0.0 - 0.2 10e9/L    Abs Immature Granulocytes 0.0 0 - 0.4 10e9/L   Comprehensive Metabolic Panel   Result Value Ref Range    Sodium 138 134 - 144 mmol/L    Potassium 3.9 3.5 - 5.1 mmol/L    Chloride 104 98 - 107 mmol/L    Carbon Dioxide 27 21 - 31 mmol/L    Anion Gap 7 3 - 14 mmol/L    Glucose 94 70 - 105 mg/dL    Urea Nitrogen 15 7 -  25 mg/dL    Creatinine 0.85 0.70 - 1.30 mg/dL    GFR Estimate 88 >60 mL/min/[1.73_m2]    GFR Estimate If Black >90 >60 mL/min/[1.73_m2]    Calcium 8.9 8.6 - 10.3 mg/dL    Bilirubin Total 0.7 0.3 - 1.0 mg/dL    Albumin 4.1 3.5 - 5.7 g/dL    Protein Total 7.3 6.4 - 8.9 g/dL    Alkaline Phosphatase 48 34 - 104 U/L    ALT 12 7 - 52 U/L    AST 18 13 - 39 U/L       If you have any questions or concerns, please call the clinic at the number listed above.       Sincerely,        Marco A Hampton MD

## 2019-08-28 NOTE — PROGRESS NOTES
Nursing Notes:   Frieda Gaines LPN  8/28/2019  2:42 PM  Signed  Chief Complaint   Patient presents with     Physical   Patient presents to the clinic for physical    Medication Reconciliation: completed   Frieda AGNIESZKA Gaines  8/28/2019 2:28 PM   Nursing note reviewed with patient.  Accuracy and completeness verified.   Mr. Pritchard is a 74 year old male who:  Patient presents with:  Physical      ICD-10-CM    1. Benign essential hypertension I10 CBC and Differential     Comprehensive Metabolic Panel     CBC and Differential     Comprehensive Metabolic Panel   2. Mixed hyperlipidemia E78.2 Lipid Panel     Lipid Panel   3. Screening PSA (prostate specific antigen) Z12.5 PSA Screen GH     PSA Screen GH   4. Cervical stenosis of spinal canal M48.02    5. Chronic neck pain M54.2     G89.29      HPI  Hypertension, currently well controlled.  Managing with diet.  Labs today.    Mixed hyperlipidemia, doing well with pravastatin.  No side effects reported.  Continue current dosing.  Labs today.    Screening PSA.  Denies urinary health changes or concerns.    Has some chronic neck pain, previously noted to have spinal stenosis.  Has limited range of motion of the neck.  Continues to work on some home exercises but really has become more and more limited with his activities because of his neck concerns.  But continues to try to remain active.  Still gets out and does some fishing during the summer.  He is not having excessive numbness or weakness of the hands or arms.  At this point he wants to continue conservative treatment.    Functional Capacity: > 4 METS.   Review of Systems   Constitutional: Negative for chills and fever.   HENT: Negative for congestion.    Eyes: Negative for visual disturbance.   Respiratory: Negative for chest tightness and shortness of breath.    Cardiovascular: Negative for chest pain.   Gastrointestinal: Negative for abdominal pain.   Genitourinary: Negative for hematuria.   Musculoskeletal: Positive for  myalgias, neck pain (  + worse with neck rotation, improved with ibuprofen) and neck stiffness. Negative for back pain.   Skin: Negative for rash (  ) and wound.   Neurological: Negative for syncope.   Hematological: Negative for adenopathy. Does not bruise/bleed easily.   Psychiatric/Behavioral: Negative for confusion.      JOSE LUIS:   JOSE LUIS-7 SCORE 11/28/2018 1/14/2019 5/31/2019   Total Score 0 0 0     PHQ9:  PHQ-9 SCORE 11/28/2018 1/14/2019 5/31/2019   PHQ-9 Total Score 0 0 0       I have personally reviewed the past medical history, past surgical history, medications, allergies, family and social history as listed below.     No Known Allergies    Current Outpatient Medications   Medication Sig Dispense Refill     omeprazole (PRILOSEC) 20 MG DR capsule Take 1 capsule (20 mg) by mouth daily Take 30 to 60 minutes prior to 1st meal of the day. 90 capsule 3     pravastatin (PRAVACHOL) 80 MG tablet Take 1 tablet (80 mg) by mouth At Bedtime 90 tablet 3     sertraline (ZOLOFT) 25 MG tablet Take 1 tablet (25 mg) by mouth daily 90 tablet 3        Patient Active Problem List    Diagnosis Date Noted     Cervical stenosis of spinal canal 01/14/2019     Priority: Medium     Neuroforaminal stenosis of cervical spine 11/28/2018     Priority: Medium     Sigmoid diverticulosis 09/17/2018     Priority: Medium     Chronic neck pain 08/15/2018     Priority: Medium     History of prediabetes 07/12/2017     Priority: Medium     Arthritis of right acromioclavicular joint 12/21/2016     Priority: Medium     Complete tear of right rotator cuff 12/21/2016     Priority: Medium     History of colon polyps, tubular adenoma x1, next scope 2028 04/22/2015     Priority: Medium     Mixed hyperlipidemia 09/17/2014     Priority: Medium     Health care maintenance 09/11/2014     Priority: Medium     History of tobacco use 09/11/2014     Priority: Medium     History of anxiety 07/10/2013     Priority: Medium     Past Medical History:   Diagnosis Date      Anxiety disorder     lifetime     History of adenomatous polyp of colon     tubular adenoma x1, next scope      Past Surgical History:   Procedure Laterality Date     ARTHROSCOPY KNEE           COLONOSCOPY      ,follow up due  -- s/p polyp removal --> next in 2018     COLONOSCOPY  09/10/2015    9/10/2015,58920.0,DE COLONOSCOPY REMOVE JULIO POLYP LESN SNARE     COLONOSCOPY  2018    splenic flexure polyp     COLONOSCOPY N/A 2018    1 small tubular adenoma follow up      SHOULDER SURGERY Right 2016     Social History     Socioeconomic History     Marital status:      Spouse name: Pamela     Number of children: None     Years of education: None     Highest education level: None   Occupational History     None   Social Needs     Financial resource strain: None     Food insecurity:     Worry: None     Inability: None     Transportation needs:     Medical: None     Non-medical: None   Tobacco Use     Smoking status: Former Smoker     Packs/day: 0.50     Years: 20.00     Pack years: 10.00     Types: Cigarettes     Last attempt to quit: 7/10/1985     Years since quittin.1     Smokeless tobacco: Never Used   Substance and Sexual Activity     Alcohol use: No     Alcohol/week: 7.0 oz     Frequency: Never     Drug use: No     Sexual activity: Never   Lifestyle     Physical activity:     Days per week: None     Minutes per session: None     Stress: None   Relationships     Social connections:     Talks on phone: None     Gets together: None     Attends Anabaptism service: None     Active member of club or organization: None     Attends meetings of clubs or organizations: None     Relationship status: None     Intimate partner violence:     Fear of current or ex partner: None     Emotionally abused: None     Physically abused: None     Forced sexual activity: None   Other Topics Concern     Parent/sibling w/ CABG, MI or angioplasty before 65F 55M? Not Asked   Social History Narrative     ", lives with wife, retired .     Family History   Problem Relation Age of Onset     Osteoporosis Mother         Osteoporosis     Genetic Disorder Other         Genetic,Mother - Diabetes.  No FHx of CVA, No early family HX of CAD, no known cancers       EXAM:   Vitals:    08/28/19 1429   BP: 120/82   BP Location: Right arm   Patient Position: Sitting   Cuff Size: Adult Regular   Pulse: 62   Resp: 16   Temp: 96.7  F (35.9  C)   TempSrc: Tympanic   Weight: 88.3 kg (194 lb 9.6 oz)   Height: 1.797 m (5' 10.75\")       Current Pain Score: Extreme Pain (8)     BP Readings from Last 3 Encounters:   08/28/19 120/82   05/31/19 124/80   04/25/19 126/70      Wt Readings from Last 3 Encounters:   08/28/19 88.3 kg (194 lb 9.6 oz)   05/31/19 90.4 kg (199 lb 4 oz)   04/25/19 90.9 kg (200 lb 6 oz)      Estimated body mass index is 27.33 kg/m  as calculated from the following:    Height as of this encounter: 1.797 m (5' 10.75\").    Weight as of this encounter: 88.3 kg (194 lb 9.6 oz).     Physical Exam   Constitutional: He appears well-developed and well-nourished. No distress.   HENT:   Head: Normocephalic and atraumatic.   Eyes: Conjunctivae are normal. No scleral icterus.   Neck: Neck supple.   Cardiovascular: Normal rate and regular rhythm.   Pulmonary/Chest: Effort normal.   Musculoskeletal: He exhibits tenderness (  neck stiffness/muscle tenderness to palpation). He exhibits no deformity.   Lymphadenopathy:     He has no cervical adenopathy.   Neurological: He is alert.   Skin: Skin is warm and dry. No rash noted. He is not diaphoretic.   Psychiatric: He has a normal mood and affect.      Procedures   INVESTIGATIONS:  Results for orders placed or performed during the hospital encounter of 01/04/19   XR Medial Branch Block Cervical Left    Narrative    PROCEDURE: XR MEDIAL BRANCH BLOCK CERVICAL LEFT    HISTORY: Facet arthropathy, cervical.    COMPARISON: MR cervical spine 9/14/2018    TECHNIQUE: The risks and " benefits of the branch blocks as well as  potential rhizotomy were discussed in detail with the patient. Risks  associated medial branch block included infection, bleeding and  inadvertent radicular nerve block. Risks associated with rhizotomy  including infection, bleeding, radicular nerve root injury and,  transient increased pain. The patient expressed understanding and  willingness to proceed. Written and informed consent was obtained.    The patient was placed prone on the fluoroscopy table. The expected  locations of the medial branches of the dorsal nerves of C2, C3 and C4  on the left were identified. The overlying skin  was marked prepped  and draped in standard sterile fashion. Lidocaine was used to  anesthetize the skin entry sites. A 22-gauge needle was advanced under  fluoroscopic observation into the expected locations of the targeted  median nerves. Multiplanar fluoroscopic imaging and trace injection of  contrast confirmed our positioning. Subsequently, 0.8 cc of  bupivacaine were administered at each site. The patient tolerated the  procedure well. A total of .48 minutes of fluoroscopy time was used.    Prior to procedure, the pain was 8 out of 10 in severity. One hour  after, it was 40% improved.      Impression    IMPRESSION: Limited efficacy of left C2, C3 and C4 medial branch  blocks. R1.    CALOS SANDERS MD       ASSESSMENT AND PLAN:  Problem List Items Addressed This Visit        Nervous and Auditory    Chronic neck pain       Endocrine    Mixed hyperlipidemia    Relevant Orders    Lipid Panel (Completed)       Other    Cervical stenosis of spinal canal      Other Visit Diagnoses     Benign essential hypertension    -  Primary    Relevant Orders    CBC and Differential (Completed)    Comprehensive Metabolic Panel (Completed)    Screening PSA (prostate specific antigen)        Relevant Orders    PSA Screen GH (Completed)        reviewed diet, exercise and weight control, recommended  sodium restriction  -- Expected clinical course discussed    -- Medications and their side effects discussed    The 10-year ASCVD risk score (Paoniabrinda VILLA Jr., et al., 2013) is: 23.5%    Values used to calculate the score:      Age: 74 years      Sex: Male      Is Non- : No      Diabetic: No      Tobacco smoker: No      Systolic Blood Pressure: 120 mmHg      Is BP treated: No      HDL Cholesterol: 36 mg/dL      Total Cholesterol: 200 mg/dL    Patient Instructions   Labs today.     Glad you are feeling well.     Congratulations on the weight loss!! Keep up the hard work!    Return in approximately 9 month(s), or sooner as needed for follow-up with Dr. Hampton.  -- annual follow-up / med refills    Clinic : [phone]  Appointment line: [phone] or [phone]      Marco A Hampton MD  Internal Medicine  [facility]     Portions of this note were dictated using speech recognition software. The note has been proofread but errors in the text may have been overlooked. Please contact me if there are any concerns regarding the accuracy of the dictation.

## 2019-08-28 NOTE — PATIENT INSTRUCTIONS
Labs today.     Glad you are feeling well.     Congratulations on the weight loss!! Keep up the hard work!    Return in approximately 9 month(s), or sooner as needed for follow-up with Dr. Hampton.  -- annual follow-up / med refills    Clinic : [phone]  Appointment line: [phone] or [phone]

## 2019-10-01 ENCOUNTER — HEALTH MAINTENANCE LETTER (OUTPATIENT)
Age: 74
End: 2019-10-01

## 2019-12-15 ENCOUNTER — HEALTH MAINTENANCE LETTER (OUTPATIENT)
Age: 74
End: 2019-12-15

## 2020-09-11 ENCOUNTER — MYC MEDICAL ADVICE (OUTPATIENT)
Dept: INTERNAL MEDICINE | Facility: OTHER | Age: 75
End: 2020-09-11

## 2020-09-11 ENCOUNTER — MYC REFILL (OUTPATIENT)
Dept: INTERNAL MEDICINE | Facility: OTHER | Age: 75
End: 2020-09-11

## 2020-09-11 DIAGNOSIS — F41.9 ANXIETY: ICD-10-CM

## 2020-09-11 DIAGNOSIS — M48.02 CERVICAL STENOSIS OF SPINAL CANAL: ICD-10-CM

## 2020-09-11 DIAGNOSIS — M54.2 CHRONIC NECK PAIN: Primary | ICD-10-CM

## 2020-09-11 DIAGNOSIS — G89.29 CHRONIC NECK PAIN: Primary | ICD-10-CM

## 2020-09-11 DIAGNOSIS — E78.2 MIXED HYPERLIPIDEMIA: ICD-10-CM

## 2020-09-11 DIAGNOSIS — M48.02 NEUROFORAMINAL STENOSIS OF CERVICAL SPINE: ICD-10-CM

## 2020-09-11 RX ORDER — PRAVASTATIN SODIUM 80 MG/1
80 TABLET ORAL AT BEDTIME
Qty: 90 TABLET | Refills: 3 | Status: CANCELLED | OUTPATIENT
Start: 2020-09-11

## 2020-09-11 RX ORDER — SERTRALINE HYDROCHLORIDE 25 MG/1
25 TABLET, FILM COATED ORAL DAILY
Qty: 90 TABLET | Refills: 3 | Status: SHIPPED | OUTPATIENT
Start: 2020-09-11 | End: 2021-10-05

## 2020-09-11 RX ORDER — PRAVASTATIN SODIUM 80 MG/1
80 TABLET ORAL AT BEDTIME
Qty: 90 TABLET | Refills: 3 | Status: SHIPPED | OUTPATIENT
Start: 2020-09-11 | End: 2021-10-05

## 2020-09-11 RX ORDER — SERTRALINE HYDROCHLORIDE 25 MG/1
25 TABLET, FILM COATED ORAL DAILY
Qty: 90 TABLET | Refills: 3 | Status: CANCELLED | OUTPATIENT
Start: 2020-09-11

## 2020-09-11 NOTE — TELEPHONE ENCOUNTER
Last office visit and labs completed on 8-28-19, no future visits present.      Emmanuelle Eduardo LPN 9/11/2020 8:14 AM

## 2020-09-16 DIAGNOSIS — F41.9 ANXIETY: ICD-10-CM

## 2020-09-16 DIAGNOSIS — E78.2 MIXED HYPERLIPIDEMIA: ICD-10-CM

## 2020-09-17 RX ORDER — SERTRALINE HYDROCHLORIDE 25 MG/1
TABLET, FILM COATED ORAL
Qty: 90 TABLET | Refills: 3 | OUTPATIENT
Start: 2020-09-17

## 2020-09-17 RX ORDER — PRAVASTATIN SODIUM 80 MG/1
TABLET ORAL
Qty: 90 TABLET | Refills: 3 | OUTPATIENT
Start: 2020-09-17

## 2020-09-18 NOTE — PATIENT INSTRUCTIONS
Preventive Health Recommendations:     See your health care provider every year to    Review health changes.     Discuss preventive care.      Review your medicines if your doctor has prescribed any.      Talk with your health care provider about whether you should have a test to screen for prostate cancer (PSA).    Every 3 years, have a diabetes test (fasting glucose). If you are at risk for diabetes, you should have this test more often.    Every 5 years, have a cholesterol test. Have this test more often if you are at risk for high cholesterol or heart disease.     Every 10 years, have a colonoscopy. Or, have a yearly FIT test (stool test). These exams will check for colon cancer.    Talk to with your health care provider about screening for Abdominal Aortic Aneurysm if you have a family history of AAA or have a history of smoking.    Shots:     Get a flu shot each year.     Get a tetanus shot every 10 years.     Talk to your doctor about your pneumonia vaccines. There are now two you should receive - Pneumovax (PPSV 23) and Prevnar (PCV 13).     Talk to your pharmacist about a shingles vaccine.     Talk to your doctor about the hepatitis B vaccine.  Nutrition:     Eat at least 5 servings of fruits and vegetables each day.     Eat whole-grain bread, whole-wheat pasta and brown rice instead of white grains and rice.     Get adequate Calcium and Vitamin D.   Lifestyle    Exercise for at least 150 minutes a week (30 minutes a day, 5 days a week). This will help you control your weight and prevent disease.     Limit alcohol to one drink per day.     No smoking.     Wear sunscreen to prevent skin cancer.    See your dentist every six months for an exam and cleaning.    See your eye doctor every 1 to 2 years to screen for conditions such as glaucoma, macular degeneration, cataracts, etc.    Personalized Prevention Plan  You are due for the preventive services outlined below.  Your care team is available to assist you  in scheduling these services.  If you have already completed any of these items, please share that information with your care team to update in your medical record.  Health Maintenance Due   Topic Date Due     Annual Wellness Visit  06/02/2010     Zoster (Shingles) Vaccine (2 of 3) 03/16/2016     PHQ-2  01/01/2020     FALL RISK ASSESSMENT  05/31/2020     Patient Education   Personalized Prevention Plan  You are due for the preventive services outlined below.  Your care team is available to assist you in scheduling these services.  If you have already completed any of these items, please share that information with your care team to update in your medical record.  Health Maintenance Due   Topic Date Due     Zoster (Shingles) Vaccine (2 of 3) 03/16/2016     FALL RISK ASSESSMENT  05/31/2020       Understanding [employer] MyPlate  The USDA (U.S. Department of Agriculture) has guidelines to help you make healthy food choices. These are called MyPlate. MyPlate shows the food groups that make up healthy meals using the image of a place setting. Before you eat, think about the healthiest choices for what to put onto your plate or into your cup or bowl. To learn more about building a healthy plate, visit www.choosemyplate.gov.    The food groups    Fruits. Any fruit or 100% fruit juice counts as part of the Fruit Group. Fruits may be fresh, canned, frozen, or dried, and may be whole, cut-up, or pureed. Make half your plate fruits and vegetables.    Vegetables. Any vegetable or 100% vegetable juice counts as a member of the Vegetable Group. Vegetables may be fresh, frozen, canned, or dried. They can be served raw or cooked and may be whole, cut-up, or mashed. Make half your plate fruits and vegetables.    Grains. All foods made from grains are part of the Grains Group. These include wheat, rice, oats, cornmeal, and barley such as bread, pasta, oatmeal, cereal, tortillas, and grits. Grains should be no more than a quarter of your  plate. At least half of your grains should be whole grains.    Protein. This group includes meat, poultry, seafood, beans and peas, eggs, processed soy products (like tofu), nuts (including nut butters), and seeds. Make protein choices no more than a quarter of your plate. Meat and poultry choices should be lean or low fat.    Dairy. All fluid milk products and foods made from milk that contain calcium, like yogurt and cheese, are part of the Dairy Group. (Foods that have little calcium, such as cream, butter, and cream cheese, are not part of the group.) Most dairy choices should be low-fat or fat-free.    Oils. These are fats that are liquid at room temperature. They include canola, corn, olive, soybean, and sunflower oil. Foods that are mainly oil include mayonnaise, certain salad dressings, and soft margarines. You should have only 5 to 7 teaspoons of oils a day. You probably already get this much from the food you eat.  Date Last Reviewed: 8/1/2017 2000-2019 [employer]. [address] [place] [address]. All rights reserved. This information is not intended as a substitute for professional medical care. Always follow your healthcare professional's instructions.          Patient Education     Understanding Seborrheic Keratosis  Seborrheic keratoses are wart-like growths on the skin. These growths are not cancer. Many people get them, especially after age 30.  How to say it  [id number]   What causes seborrheic keratoses?  Doctors do not know what causes seborrheic keratoses. They may run in families. In addition, they become more common as people get older.  What are the symptoms of seborrheic keratoses?  Here is what seborrheic keratoses often look like:    They tend to be round or oval in shape. They can be very small or about as big across as a quarter.    They can appear singly or in clusters.    They tend to be tan, brown, or black in color.    The edges may be  scalloped or uneven-looking.    They can have a waxy or scaly look.    They can be a bit raised, appearing to be  stuck on  the skin.    They may become red and irritated if scratched or rubbed by clothing  Sebhorreic keratoses are not painful, but they may be itchy. They can become irritated if they are continually rubbed by skin or clothing. Seborrheic keratoses most often appear on the face, arms, chest, back, or belly.  How are seborrheic keratoses treated?  Seborrheic keratoses don t need to be treated unless they bother you. You may choose to have them removed because you find them unattractive. You may also want them removed because they get irritated and uncomfortable. A healthcare provider can remove them in an office visit. Ways that seborrheic keratoses can be removed include:    Freezing them off with liquid nitrogen    Cutting them off with a scalpel    Burning them off with electricity  What are the complications of seborrheic keratoses?  Seborrheic keratoses are not cancer, but they can look like some types of skin cancer. So it s a good idea to ask your healthcare provider to check any new skin growths. Ask your healthcare provider about any skin problem that concerns you.  When should I call my healthcare provider?  Call your healthcare provider right away if any of these occur:    You develop a lot of seborrheic keratoses very quickly    You have a sore that does not heal within a few weeks, or heals and then comes back    You have a mole or skin growth that is changing in size, shape, or color    You have a mole or skin growth that looks different on one side from the other    You have a mole or skin growth that is not the same color throughout   Date Last Reviewed: 5/1/2016 2000-2019 [employer]. [address] [place] [address], [place] [address]. All rights reserved. This information is not intended as a substitute for professional medical care. Always follow your healthcare  professional's instructions.

## 2020-09-22 ASSESSMENT — ANXIETY QUESTIONNAIRES
GAD7 TOTAL SCORE: 0
4. TROUBLE RELAXING: NOT AT ALL
7. FEELING AFRAID AS IF SOMETHING AWFUL MIGHT HAPPEN: NOT AT ALL
6. BECOMING EASILY ANNOYED OR IRRITABLE: NOT AT ALL
2. NOT BEING ABLE TO STOP OR CONTROL WORRYING: NOT AT ALL
7. FEELING AFRAID AS IF SOMETHING AWFUL MIGHT HAPPEN: NOT AT ALL
1. FEELING NERVOUS, ANXIOUS, OR ON EDGE: NOT AT ALL
5. BEING SO RESTLESS THAT IT IS HARD TO SIT STILL: NOT AT ALL
3. WORRYING TOO MUCH ABOUT DIFFERENT THINGS: NOT AT ALL
GAD7 TOTAL SCORE: 0

## 2020-09-22 ASSESSMENT — ENCOUNTER SYMPTOMS
HEARTBURN: 0
PALPITATIONS: 0
COUGH: 0
DYSURIA: 0
JOINT SWELLING: 0
WEAKNESS: 0
MYALGIAS: 1
CONSTIPATION: 0
ABDOMINAL PAIN: 0
FEVER: 0
NAUSEA: 0
SHORTNESS OF BREATH: 0
ARTHRALGIAS: 1
NERVOUS/ANXIOUS: 0
EYE PAIN: 0
CHILLS: 0
HEMATURIA: 0
PARESTHESIAS: 0
HEADACHES: 0
DIARRHEA: 0
FREQUENCY: 0
DIZZINESS: 0
HEMATOCHEZIA: 0
SORE THROAT: 0

## 2020-09-22 ASSESSMENT — ACTIVITIES OF DAILY LIVING (ADL): CURRENT_FUNCTION: NO ASSISTANCE NEEDED

## 2020-09-23 ENCOUNTER — OFFICE VISIT (OUTPATIENT)
Dept: FAMILY MEDICINE | Facility: OTHER | Age: 75
End: 2020-09-23
Payer: COMMERCIAL

## 2020-09-23 VITALS
HEIGHT: 72 IN | SYSTOLIC BLOOD PRESSURE: 136 MMHG | TEMPERATURE: 97 F | DIASTOLIC BLOOD PRESSURE: 70 MMHG | RESPIRATION RATE: 12 BRPM | WEIGHT: 188 LBS | BODY MASS INDEX: 25.47 KG/M2 | OXYGEN SATURATION: 97 % | HEART RATE: 64 BPM

## 2020-09-23 DIAGNOSIS — Z00.00 ROUTINE GENERAL MEDICAL EXAMINATION AT A HEALTH CARE FACILITY: Primary | ICD-10-CM

## 2020-09-23 DIAGNOSIS — Z12.5 ENCOUNTER FOR SCREENING FOR MALIGNANT NEOPLASM OF PROSTATE: ICD-10-CM

## 2020-09-23 DIAGNOSIS — L82.1 SEBORRHEIC KERATOSIS: ICD-10-CM

## 2020-09-23 DIAGNOSIS — Z87.898 HISTORY OF PREDIABETES: ICD-10-CM

## 2020-09-23 DIAGNOSIS — F41.9 ANXIETY: ICD-10-CM

## 2020-09-23 DIAGNOSIS — Z00.00 ENCOUNTER FOR MEDICARE ANNUAL WELLNESS EXAM: ICD-10-CM

## 2020-09-23 DIAGNOSIS — E78.2 MIXED HYPERLIPIDEMIA: ICD-10-CM

## 2020-09-23 LAB
ALBUMIN SERPL-MCNC: 3.7 G/DL (ref 3.4–5)
ALP SERPL-CCNC: 57 U/L (ref 40–150)
ALT SERPL W P-5'-P-CCNC: 23 U/L (ref 0–70)
ANION GAP SERPL CALCULATED.3IONS-SCNC: 5 MMOL/L (ref 3–14)
AST SERPL W P-5'-P-CCNC: 26 U/L (ref 0–45)
BILIRUB SERPL-MCNC: 0.8 MG/DL (ref 0.2–1.3)
BUN SERPL-MCNC: 22 MG/DL (ref 7–30)
CALCIUM SERPL-MCNC: 8.7 MG/DL (ref 8.5–10.1)
CHLORIDE SERPL-SCNC: 105 MMOL/L (ref 94–109)
CHOLEST SERPL-MCNC: 186 MG/DL
CO2 SERPL-SCNC: 28 MMOL/L (ref 20–32)
CREAT SERPL-MCNC: 0.88 MG/DL (ref 0.66–1.25)
GFR SERPL CREATININE-BSD FRML MDRD: 84 ML/MIN/{1.73_M2}
GLUCOSE SERPL-MCNC: 108 MG/DL (ref 70–99)
HDLC SERPL-MCNC: 60 MG/DL
LDLC SERPL CALC-MCNC: 108 MG/DL
NONHDLC SERPL-MCNC: 126 MG/DL
POTASSIUM SERPL-SCNC: 3.9 MMOL/L (ref 3.4–5.3)
PROT SERPL-MCNC: 7.6 G/DL (ref 6.8–8.8)
PSA SERPL-ACNC: 1.57 UG/L (ref 0–4)
SODIUM SERPL-SCNC: 138 MMOL/L (ref 133–144)
TRIGL SERPL-MCNC: 90 MG/DL

## 2020-09-23 PROCEDURE — 80061 LIPID PANEL: CPT | Performed by: FAMILY MEDICINE

## 2020-09-23 PROCEDURE — G0103 PSA SCREENING: HCPCS | Performed by: FAMILY MEDICINE

## 2020-09-23 PROCEDURE — 80053 COMPREHEN METABOLIC PANEL: CPT | Performed by: FAMILY MEDICINE

## 2020-09-23 PROCEDURE — 36415 COLL VENOUS BLD VENIPUNCTURE: CPT | Performed by: FAMILY MEDICINE

## 2020-09-23 PROCEDURE — G0438 PPPS, INITIAL VISIT: HCPCS | Performed by: FAMILY MEDICINE

## 2020-09-23 ASSESSMENT — ENCOUNTER SYMPTOMS
PARESTHESIAS: 0
HEMATOCHEZIA: 0
HEADACHES: 0
SORE THROAT: 0
MYALGIAS: 1
COUGH: 0
NERVOUS/ANXIOUS: 0
CONSTIPATION: 0
DIARRHEA: 0
ARTHRALGIAS: 1
EYE PAIN: 0
PALPITATIONS: 0
HEMATURIA: 0
HEARTBURN: 0
DYSURIA: 0
FEVER: 0
SHORTNESS OF BREATH: 0
NAUSEA: 0
ABDOMINAL PAIN: 0
WEAKNESS: 0
FREQUENCY: 0
CHILLS: 0
JOINT SWELLING: 0
DIZZINESS: 0

## 2020-09-23 ASSESSMENT — MIFFLIN-ST. JEOR: SCORE: 1625.76

## 2020-09-23 ASSESSMENT — ANXIETY QUESTIONNAIRES: GAD7 TOTAL SCORE: 0

## 2020-09-23 ASSESSMENT — ACTIVITIES OF DAILY LIVING (ADL): CURRENT_FUNCTION: NO ASSISTANCE NEEDED

## 2020-09-23 ASSESSMENT — PAIN SCALES - GENERAL: PAINLEVEL: MODERATE PAIN (5)

## 2020-09-24 NOTE — RESULT ENCOUNTER NOTE
Don, your results look pretty good. Just above desirable for bad cholesterol and blood sugar. Continue to attempt to stay active and eat healthy foods to maintain and can recheck for trends annually.  Please let me know if you have any questions.  Emmanuelle Sylvester MD

## 2021-02-05 ENCOUNTER — TRANSFERRED RECORDS (OUTPATIENT)
Dept: HEALTH INFORMATION MANAGEMENT | Facility: CLINIC | Age: 76
End: 2021-02-05

## 2021-09-18 DIAGNOSIS — E78.2 MIXED HYPERLIPIDEMIA: ICD-10-CM

## 2021-09-18 DIAGNOSIS — F41.9 ANXIETY: ICD-10-CM

## 2021-09-21 RX ORDER — SERTRALINE HYDROCHLORIDE 25 MG/1
TABLET, FILM COATED ORAL
Qty: 90 TABLET | Refills: 3 | OUTPATIENT
Start: 2021-09-21

## 2021-09-21 RX ORDER — PRAVASTATIN SODIUM 80 MG/1
TABLET ORAL
Qty: 90 TABLET | Refills: 3 | OUTPATIENT
Start: 2021-09-21

## 2021-09-24 ENCOUNTER — TRANSFERRED RECORDS (OUTPATIENT)
Dept: HEALTH INFORMATION MANAGEMENT | Facility: CLINIC | Age: 76
End: 2021-09-24

## 2021-10-04 NOTE — PATIENT INSTRUCTIONS

## 2021-10-04 NOTE — PROGRESS NOTES
[address]  [facility] [address]  Phone: [phone]  Primary Provider: Emmanuelle Sylvester  Pre-op Performing Provider: SAW LANDRUM      PREOPERATIVE EVALUATION:  Today's date: 10/5/2021    Lauro Pritchard is a 76 year old male who presents for a preoperative evaluation.    Surgical Information:  Surgery/Procedure: Left rotator cuff  Surgery Location: [facility]  Surgeon: Dr. Barber  Surgery Date: 10/13/21  Time of Surgery: TBD  Where patient plans to recover: At home with family  Fax number for surgical facility: [phone]    Type of Anesthesia Anticipated: General    Assessment & Plan     The proposed surgical procedure is considered INTERMEDIATE risk.    Preop general physical exam  - CBC with platelets and differential; Future  - CBC with platelets and differential    Tear of left rotator cuff, unspecified tear extent, unspecified whether traumatic    Mixed hyperlipidemia  - Lipid panel reflex to direct LDL Fasting; Future  - Comprehensive metabolic panel (BMP + Alb, Alk Phos, ALT, AST, Total. Bili, TP); Future  - CBC with platelets and differential; Future  - EKG 12-lead complete w/read - Clinics  - pravastatin (PRAVACHOL) 80 MG tablet; Take 1 tablet (80 mg) by mouth At Bedtime  - Lipid panel reflex to direct LDL Fasting  - Comprehensive metabolic panel (BMP + Alb, Alk Phos, ALT, AST, Total. Bili, TP)  - CBC with platelets and differential    Encounter for screening for malignant neoplasm of prostate   - PSA, screen; Future  - PSA, screen    Anxiety  - sertraline (ZOLOFT) 25 MG tablet; Take 1 tablet (25 mg) by mouth daily    Encounter for Medicare annual wellness exam           Risks and Recommendations:  The patient has the following additional risks and recommendations for perioperative complications:   - No identified additional risk factors other than previously addressed    Medication Instructions:  Patient is to take all scheduled  medications on the day of surgery    RECOMMENDATION:  APPROVAL GIVEN to proceed with proposed procedure, without further diagnostic evaluation.      Subjective     HPI related to upcoming procedure: Has had an issue with rotator cuff on the left shoulder for a while now but now at night he has to hold arm a certain way so he doesn't have pain.     Preop Questions 9/29/2021   1. Have you ever had a heart attack or stroke? No   2. Have you ever had surgery on your heart or blood vessels, such as a stent placement, a coronary artery bypass, or surgery on an artery in your head, neck, heart, or legs? No   3. Do you have chest pain with activity? No   4. Do you have a history of  heart failure? No   5. Do you currently have a cold, bronchitis or symptoms of other infection? No   6. Do you have a cough, shortness of breath, or wheezing? No   7. Do you or anyone in your family have previous history of blood clots? No   8. Do you or does anyone in your family have a serious bleeding problem such as prolonged bleeding following surgeries or cuts? No   9. Have you ever had problems with anemia or been told to take iron pills? No   10. Have you had any abnormal blood loss such as black, tarry or bloody stools? No   11. Have you ever had a blood transfusion? No   12. Are you willing to have a blood transfusion if it is medically needed before, during, or after your surgery? Yes   13. Have you or any of your relatives ever had problems with anesthesia? No   14. Do you have sleep apnea, excessive snoring or daytime drowsiness? No   15. Do you have any artifical heart valves or other implanted medical devices like a pacemaker, defibrillator, or continuous glucose monitor? No   16. Do you have artificial joints? No   17. Are you allergic to latex? No       Health Care Directive:  Patient has a Health Care Directive on file      Preoperative Review of :   reviewed - n oconcerns    Status of Chronic Conditions:  See problem  list for active medical problems.  Problems all longstanding and stable, except as noted/documented.  See ROS for pertinent symptoms related to these conditions.    HYPERLIPIDEMIA - Patient has a long history of significant Hyperlipidemia requiring medication for treatment with recent good control. Patient reports no problems or side effects with the medication.       Review of Systems  Constitutional, neuro, ENT, endocrine, pulmonary, cardiac, gastrointestinal, genitourinary, musculoskeletal, integument and psychiatric systems are negative, except as otherwise noted.    Patient Active Problem List    Diagnosis Date Noted     Cervical stenosis of spinal canal 01/14/2019     Priority: Medium     Neuroforaminal stenosis of cervical spine 11/28/2018     Priority: Medium     Sigmoid diverticulosis 09/17/2018     Priority: Medium     Chronic neck pain 08/15/2018     Priority: Medium     History of prediabetes 07/12/2017     Priority: Medium     Arthritis of right acromioclavicular joint 12/21/2016     Priority: Medium     Complete tear of right rotator cuff 12/21/2016     Priority: Medium     History of colon polyps, tubular adenoma x1, next scope 2028 04/22/2015     Priority: Medium     Mixed hyperlipidemia 09/17/2014     Priority: Medium     Health care maintenance 09/11/2014     Priority: Medium     History of tobacco use 09/11/2014     Priority: Medium      Past Medical History:   Diagnosis Date     Anxiety disorder     lifetime     Arthritis neck and knee     History of adenomatous polyp of colon     tubular adenoma x1, next scope 2028     Past Surgical History:   Procedure Laterality Date     ARTHROSCOPY KNEE      2015     COLONOSCOPY      2010,follow up due 2015 -- s/p polyp removal --> next in 2018     COLONOSCOPY  09/10/2015    9/10/2015,69217.0,[place] COLONOSCOPY REMOVE JULIO POLYP LESN SNARE     COLONOSCOPY  09/17/2018    splenic flexure polyp     COLONOSCOPY N/A 9/17/2018    1 small tubular adenoma follow up 2028      SHOULDER SURGERY Right 2016     Current Outpatient Medications   Medication Sig Dispense Refill     pravastatin (PRAVACHOL) 80 MG tablet Take 1 tablet (80 mg) by mouth At Bedtime 90 tablet 3     sertraline (ZOLOFT) 25 MG tablet Take 1 tablet (25 mg) by mouth daily 90 tablet 3       No Known Allergies     Social History     Tobacco Use     Smoking status: Former Smoker     Packs/day: 0.50     Years: 20.00     Pack years: 10.00     Types: Cigarettes     Quit date: 7/10/1985     Years since quittin.2     Smokeless tobacco: Never Used   Substance Use Topics     Alcohol use: No     Alcohol/week: 11.7 standard drinks     Family History   Problem Relation Age of Onset     Osteoporosis Mother         Osteoporosis     Genetic Disorder Other         Genetic,Mother - Diabetes.  No FHx of CVA, No early family HX of CAD, no known cancers     History   Drug Use No         Objective     /72   Pulse 55   Temp 97.2  F (36.2  C) (Temporal)   Resp 14   Wt 83 kg (183 lb)   SpO2 98%   BMI 24.82 kg/m      Physical Exam    GENERAL APPEARANCE: healthy, alert and no distress     EYES: EOMI,  PERRL     HENT: ear canals and TM's normal and nose and mouth without ulcers or lesions     NECK: no adenopathy, no asymmetry, masses, or scars and thyroid normal to palpation     RESP: lungs clear to auscultation - no rales, rhonchi or wheezes     CV: regular rates and rhythm, normal S1 S2, no S3 or S4 and no murmur, click or rub     ABDOMEN:  soft, nontender, no HSM or masses and bowel sounds normal     MS: extremities normal- no gross deformities noted, no evidence of inflammation in joints, FROM in all extremities.     SKIN: no suspicious lesions or rashes     NEURO: Normal strength and tone, sensory exam grossly normal, mentation intact and speech normal     PSYCH: mentation appears normal. and affect normal/bright     LYMPHATICS: No cervical adenopathy    Recent Labs   Lab Test 20  0821      POTASSIUM 3.9   CR 0.88         Diagnostics:  Recent Results (from the past 24 hour(s))   Lipid panel reflex to direct LDL Fasting    Collection Time: 10/05/21  7:45 AM   Result Value Ref Range    Cholesterol 187 <200 mg/dL    Triglycerides 79 <150 mg/dL    Direct Measure HDL 67 >=40 mg/dL    LDL Cholesterol Calculated 104 (H) <=100 mg/dL    Non HDL Cholesterol 120 <130 mg/dL    Patient Fasting > 8hrs? Yes    Comprehensive metabolic panel (BMP + Alb, Alk Phos, ALT, AST, Total. Bili, TP)    Collection Time: 10/05/21  7:45 AM   Result Value Ref Range    Sodium 139 133 - 144 mmol/L    Potassium 4.0 3.4 - 5.3 mmol/L    Chloride 108 94 - 109 mmol/L    Carbon Dioxide (CO2) 29 20 - 32 mmol/L    Anion Gap 2 (L) 3 - 14 mmol/L    Urea Nitrogen 14 7 - 30 mg/dL    Creatinine 0.79 0.66 - 1.25 mg/dL    Calcium 8.4 (L) 8.5 - 10.1 mg/dL    Glucose 102 (H) 70 - 99 mg/dL    Alkaline Phosphatase 53 40 - 150 U/L    AST 22 0 - 45 U/L    ALT 25 0 - 70 U/L    Protein Total 7.2 6.8 - 8.8 g/dL    Albumin 3.7 3.4 - 5.0 g/dL    Bilirubin Total 0.9 0.2 - 1.3 mg/dL    GFR Estimate 87 >60 mL/min/1.73m2   CBC with platelets and differential    Collection Time: 10/05/21  7:45 AM   Result Value Ref Range    WBC Count 4.7 4.0 - 11.0 10e3/uL    RBC Count 4.38 (L) 4.40 - 5.90 10e6/uL    Hemoglobin 14.0 13.3 - 17.7 g/dL    Hematocrit 41.7 40.0 - 53.0 %    MCV 95 78 - 100 fL    MCH 32.0 26.5 - 33.0 pg    MCHC 33.6 31.5 - 36.5 g/dL    RDW 14.3 10.0 - 15.0 %    Platelet Count 224 150 - 450 10e3/uL    % Neutrophils 46 %    % Lymphocytes 31 %    % Monocytes 17 %    % Eosinophils 5 %    % Basophils 1 %    Absolute Neutrophils 2.1 1.6 - 8.3 10e3/uL    Absolute Lymphocytes 1.4 0.8 - 5.3 10e3/uL    Absolute Monocytes 0.8 0.0 - 1.3 10e3/uL    Absolute Eosinophils 0.3 0.0 - 0.7 10e3/uL    Absolute Basophils 0.1 0.0 - 0.2 10e3/uL      EKG: appears normal, NSR, normal axis, normal intervals, no acute ST/T changes c/w ischemia, no LVH by voltage criteria, unchanged from previous  "tracings    Revised Cardiac Risk Index (RCRI):  The patient has the following serious cardiovascular risks for perioperative complications:   - No serious cardiac risks = 0 points     RCRI Interpretation: 0 points: Class I (very low risk - 0.4% complication rate)           Signed Electronically by: Jarrett Ibarra PA-C  Copy of this evaluation report is provided to requesting physician.    Annual Wellness Visit  Patient has been advised of split billing requirements and indicates understanding: Yes     Are you in the first 12 months of your Medicare Part B coverage?  No    Physical Health:    In general, how would you rate your overall physical health? excellent    Outside of work, how many days during the week do you exercise?none regular but very active all day    Outside of work, approximately how many minutes a day do you exercise?15-30 minutes    If you drink alcohol do you typically have >3 drinks per day or >7 drinks per week? No    Do you usually eat at least 4 servings of fruit and vegetables a day, include whole grains & fiber and avoid regularly eating high fat or \"junk\" foods? Yes    Do you have any problems taking medications regularly? No    Do you have any side effects from medications? none    Needs assistance for the following daily activities: no assistance needed    Which of the following safety concerns are present in your home?  none identified     Hearing impairment: No    In the past 6 months, have you been bothered by leaking of urine? no    Mental Health:    In general, how would you rate your overall mental or emotional health? excellent  PHQ-2 Score:      Do you feel safe in your environment? Yes    Have you ever done Advance Care Planning? (For example, a Health Directive, POLST, or a discussion with a medical provider or your loved ones about your wishes)? Yes, patient states has an Advance Care Planning document and will bring a copy to the clinic.    Fall risk:  Fallen 2 or more times " in the past year?: No  Any fall with injury in the past year?: No    Cognitive Screenin) Repeat 3 items (Leader, Season, Table)    2) Clock draw: NORMAL  3) 3 item recall: Recalls 2 objects   Results: NORMAL clock, 1-2 items recalled: COGNITIVE IMPAIRMENT LESS LIKELY    Mini-CogTM Copyright S Keeley. Licensed by the author for use in [facility]; reprinted with permission (juan alberto@[facility]). All rights reserved.      Do you have sleep apnea, excessive snoring or daytime drowsiness?: no    Current providers sharing in care for this patient include:   Patient Care Team:  Emmanuelle Sylvester MD as PCP - General (Family Practice)  Marco A Hampton MD as Assigned PCP

## 2021-10-05 ENCOUNTER — OFFICE VISIT (OUTPATIENT)
Dept: FAMILY MEDICINE | Facility: OTHER | Age: 76
End: 2021-10-05
Payer: COMMERCIAL

## 2021-10-05 VITALS
WEIGHT: 183 LBS | HEART RATE: 55 BPM | TEMPERATURE: 97.2 F | SYSTOLIC BLOOD PRESSURE: 126 MMHG | DIASTOLIC BLOOD PRESSURE: 72 MMHG | BODY MASS INDEX: 24.82 KG/M2 | OXYGEN SATURATION: 98 % | RESPIRATION RATE: 14 BRPM

## 2021-10-05 DIAGNOSIS — E78.2 MIXED HYPERLIPIDEMIA: ICD-10-CM

## 2021-10-05 DIAGNOSIS — Z12.5 ENCOUNTER FOR SCREENING FOR MALIGNANT NEOPLASM OF PROSTATE: ICD-10-CM

## 2021-10-05 DIAGNOSIS — F41.9 ANXIETY: ICD-10-CM

## 2021-10-05 DIAGNOSIS — Z00.00 ENCOUNTER FOR MEDICARE ANNUAL WELLNESS EXAM: ICD-10-CM

## 2021-10-05 DIAGNOSIS — Z28.21 INFLUENZA VACCINATION DECLINED BY PATIENT: ICD-10-CM

## 2021-10-05 DIAGNOSIS — M75.102 TEAR OF LEFT ROTATOR CUFF, UNSPECIFIED TEAR EXTENT, UNSPECIFIED WHETHER TRAUMATIC: ICD-10-CM

## 2021-10-05 DIAGNOSIS — Z01.818 PREOP GENERAL PHYSICAL EXAM: Primary | ICD-10-CM

## 2021-10-05 LAB
ALBUMIN SERPL-MCNC: 3.7 G/DL (ref 3.4–5)
ALP SERPL-CCNC: 53 U/L (ref 40–150)
ALT SERPL W P-5'-P-CCNC: 25 U/L (ref 0–70)
ANION GAP SERPL CALCULATED.3IONS-SCNC: 2 MMOL/L (ref 3–14)
AST SERPL W P-5'-P-CCNC: 22 U/L (ref 0–45)
BASOPHILS # BLD AUTO: 0.1 10E3/UL (ref 0–0.2)
BASOPHILS NFR BLD AUTO: 1 %
BILIRUB SERPL-MCNC: 0.9 MG/DL (ref 0.2–1.3)
BUN SERPL-MCNC: 14 MG/DL (ref 7–30)
CALCIUM SERPL-MCNC: 8.4 MG/DL (ref 8.5–10.1)
CHLORIDE BLD-SCNC: 108 MMOL/L (ref 94–109)
CHOLEST SERPL-MCNC: 187 MG/DL
CO2 SERPL-SCNC: 29 MMOL/L (ref 20–32)
CREAT SERPL-MCNC: 0.79 MG/DL (ref 0.66–1.25)
EOSINOPHIL # BLD AUTO: 0.3 10E3/UL (ref 0–0.7)
EOSINOPHIL NFR BLD AUTO: 5 %
ERYTHROCYTE [DISTWIDTH] IN BLOOD BY AUTOMATED COUNT: 14.3 % (ref 10–15)
FASTING STATUS PATIENT QL REPORTED: YES
GFR SERPL CREATININE-BSD FRML MDRD: 87 ML/MIN/1.73M2
GLUCOSE BLD-MCNC: 102 MG/DL (ref 70–99)
HCT VFR BLD AUTO: 41.7 % (ref 40–53)
HDLC SERPL-MCNC: 67 MG/DL
HGB BLD-MCNC: 14 G/DL (ref 13.3–17.7)
LDLC SERPL CALC-MCNC: 104 MG/DL
LYMPHOCYTES # BLD AUTO: 1.4 10E3/UL (ref 0.8–5.3)
LYMPHOCYTES NFR BLD AUTO: 31 %
MCH RBC QN AUTO: 32 PG (ref 26.5–33)
MCHC RBC AUTO-ENTMCNC: 33.6 G/DL (ref 31.5–36.5)
MCV RBC AUTO: 95 FL (ref 78–100)
MONOCYTES # BLD AUTO: 0.8 10E3/UL (ref 0–1.3)
MONOCYTES NFR BLD AUTO: 17 %
NEUTROPHILS # BLD AUTO: 2.1 10E3/UL (ref 1.6–8.3)
NEUTROPHILS NFR BLD AUTO: 46 %
NONHDLC SERPL-MCNC: 120 MG/DL
PLATELET # BLD AUTO: 224 10E3/UL (ref 150–450)
POTASSIUM BLD-SCNC: 4 MMOL/L (ref 3.4–5.3)
PROT SERPL-MCNC: 7.2 G/DL (ref 6.8–8.8)
PSA SERPL-MCNC: 1.52 UG/L (ref 0–4)
RBC # BLD AUTO: 4.38 10E6/UL (ref 4.4–5.9)
SODIUM SERPL-SCNC: 139 MMOL/L (ref 133–144)
TRIGL SERPL-MCNC: 79 MG/DL
WBC # BLD AUTO: 4.7 10E3/UL (ref 4–11)

## 2021-10-05 PROCEDURE — 80061 LIPID PANEL: CPT | Performed by: PHYSICIAN ASSISTANT

## 2021-10-05 PROCEDURE — 99397 PER PM REEVAL EST PAT 65+ YR: CPT | Performed by: PHYSICIAN ASSISTANT

## 2021-10-05 PROCEDURE — 80053 COMPREHEN METABOLIC PANEL: CPT | Performed by: PHYSICIAN ASSISTANT

## 2021-10-05 PROCEDURE — 85025 COMPLETE CBC W/AUTO DIFF WBC: CPT | Performed by: PHYSICIAN ASSISTANT

## 2021-10-05 PROCEDURE — G0103 PSA SCREENING: HCPCS | Performed by: PHYSICIAN ASSISTANT

## 2021-10-05 PROCEDURE — 36415 COLL VENOUS BLD VENIPUNCTURE: CPT | Performed by: PHYSICIAN ASSISTANT

## 2021-10-05 PROCEDURE — 93000 ELECTROCARDIOGRAM COMPLETE: CPT | Performed by: PHYSICIAN ASSISTANT

## 2021-10-05 PROCEDURE — 99214 OFFICE O/P EST MOD 30 MIN: CPT | Mod: 25 | Performed by: PHYSICIAN ASSISTANT

## 2021-10-05 RX ORDER — SERTRALINE HYDROCHLORIDE 25 MG/1
25 TABLET, FILM COATED ORAL DAILY
Qty: 90 TABLET | Refills: 3 | Status: SHIPPED | OUTPATIENT
Start: 2021-10-05 | End: 2022-04-13

## 2021-10-05 RX ORDER — PRAVASTATIN SODIUM 80 MG/1
80 TABLET ORAL AT BEDTIME
Qty: 90 TABLET | Refills: 3 | Status: SHIPPED | OUTPATIENT
Start: 2021-10-05 | End: 2022-09-26

## 2021-10-22 ENCOUNTER — TRANSFERRED RECORDS (OUTPATIENT)
Dept: HEALTH INFORMATION MANAGEMENT | Facility: CLINIC | Age: 76
End: 2021-10-22

## 2021-11-19 ENCOUNTER — TRANSFERRED RECORDS (OUTPATIENT)
Dept: HEALTH INFORMATION MANAGEMENT | Facility: CLINIC | Age: 76
End: 2021-11-19

## 2022-01-11 ENCOUNTER — TRANSFERRED RECORDS (OUTPATIENT)
Dept: HEALTH INFORMATION MANAGEMENT | Facility: CLINIC | Age: 77
End: 2022-01-11
Payer: COMMERCIAL

## 2022-03-21 ENCOUNTER — HOSPITAL ENCOUNTER (EMERGENCY)
Facility: CLINIC | Age: 77
Discharge: HOME | End: 2022-03-21
Attending: PHYSICIAN ASSISTANT | Admitting: PHYSICIAN ASSISTANT
Payer: COMMERCIAL

## 2022-03-21 ENCOUNTER — APPOINTMENT (OUTPATIENT)
Dept: GENERAL RADIOLOGY | Facility: CLINIC | Age: 77
End: 2022-03-21
Attending: PHYSICIAN ASSISTANT
Payer: COMMERCIAL

## 2022-03-21 ENCOUNTER — HOSPITAL ENCOUNTER (OUTPATIENT)
Dept: CARDIOLOGY | Facility: CLINIC | Age: 77
Discharge: HOME | End: 2022-03-21
Attending: PHYSICIAN ASSISTANT | Admitting: PHYSICIAN ASSISTANT
Payer: COMMERCIAL

## 2022-03-21 VITALS
WEIGHT: 185.6 LBS | DIASTOLIC BLOOD PRESSURE: 76 MMHG | BODY MASS INDEX: 25.17 KG/M2 | RESPIRATION RATE: 21 BRPM | SYSTOLIC BLOOD PRESSURE: 140 MMHG | OXYGEN SATURATION: 97 % | HEART RATE: 59 BPM | TEMPERATURE: 98 F

## 2022-03-21 DIAGNOSIS — R07.89 ATYPICAL CHEST PAIN: ICD-10-CM

## 2022-03-21 DIAGNOSIS — R00.2 PALPITATIONS: ICD-10-CM

## 2022-03-21 LAB
ALBUMIN SERPL-MCNC: 3.8 G/DL (ref 3.4–5)
ALP SERPL-CCNC: 51 U/L (ref 40–150)
ALT SERPL W P-5'-P-CCNC: 24 U/L (ref 0–70)
ANION GAP SERPL CALCULATED.3IONS-SCNC: 5 MMOL/L (ref 3–14)
AST SERPL W P-5'-P-CCNC: 41 U/L (ref 0–45)
BASOPHILS # BLD AUTO: 0 10E3/UL (ref 0–0.2)
BASOPHILS NFR BLD AUTO: 1 %
BILIRUB DIRECT SERPL-MCNC: 0.2 MG/DL (ref 0–0.2)
BILIRUB SERPL-MCNC: 0.8 MG/DL (ref 0.2–1.3)
BUN SERPL-MCNC: 14 MG/DL (ref 7–30)
CALCIUM SERPL-MCNC: 8.4 MG/DL (ref 8.5–10.1)
CHLORIDE BLD-SCNC: 109 MMOL/L (ref 94–109)
CO2 SERPL-SCNC: 25 MMOL/L (ref 20–32)
CREAT SERPL-MCNC: 0.86 MG/DL (ref 0.66–1.25)
D DIMER PPP FEU-MCNC: 0.44 UG/ML FEU (ref 0–0.5)
EOSINOPHIL # BLD AUTO: 0 10E3/UL (ref 0–0.7)
EOSINOPHIL NFR BLD AUTO: 1 %
ERYTHROCYTE [DISTWIDTH] IN BLOOD BY AUTOMATED COUNT: 13.2 % (ref 10–15)
GFR SERPL CREATININE-BSD FRML MDRD: 90 ML/MIN/1.73M2
GLUCOSE BLD-MCNC: 106 MG/DL (ref 70–99)
HCT VFR BLD AUTO: 39.9 % (ref 40–53)
HGB BLD-MCNC: 13.8 G/DL (ref 13.3–17.7)
IMM GRANULOCYTES # BLD: 0 10E3/UL
IMM GRANULOCYTES NFR BLD: 0 %
LIPASE SERPL-CCNC: 116 U/L (ref 73–393)
LYMPHOCYTES # BLD AUTO: 1 10E3/UL (ref 0.8–5.3)
LYMPHOCYTES NFR BLD AUTO: 15 %
MAGNESIUM SERPL-MCNC: 2.3 MG/DL (ref 1.6–2.3)
MCH RBC QN AUTO: 32.2 PG (ref 26.5–33)
MCHC RBC AUTO-ENTMCNC: 34.6 G/DL (ref 31.5–36.5)
MCV RBC AUTO: 93 FL (ref 78–100)
MONOCYTES # BLD AUTO: 0.6 10E3/UL (ref 0–1.3)
MONOCYTES NFR BLD AUTO: 10 %
NEUTROPHILS # BLD AUTO: 4.8 10E3/UL (ref 1.6–8.3)
NEUTROPHILS NFR BLD AUTO: 73 %
NRBC # BLD AUTO: 0 10E3/UL
NRBC BLD AUTO-RTO: 0 /100
PLATELET # BLD AUTO: 184 10E3/UL (ref 150–450)
POTASSIUM BLD-SCNC: 3.6 MMOL/L (ref 3.4–5.3)
PROT SERPL-MCNC: 6.8 G/DL (ref 6.8–8.8)
RBC # BLD AUTO: 4.28 10E6/UL (ref 4.4–5.9)
SODIUM SERPL-SCNC: 139 MMOL/L (ref 133–144)
TROPONIN I SERPL HS-MCNC: 6 NG/L
TSH SERPL DL<=0.005 MIU/L-ACNC: 0.85 MU/L (ref 0.4–4)
WBC # BLD AUTO: 6.5 10E3/UL (ref 4–11)

## 2022-03-21 PROCEDURE — 36415 COLL VENOUS BLD VENIPUNCTURE: CPT | Performed by: PHYSICIAN ASSISTANT

## 2022-03-21 PROCEDURE — 71045 X-RAY EXAM CHEST 1 VIEW: CPT

## 2022-03-21 PROCEDURE — 93242 EXT ECG>48HR<7D RECORDING: CPT

## 2022-03-21 PROCEDURE — 84484 ASSAY OF TROPONIN QUANT: CPT | Performed by: PHYSICIAN ASSISTANT

## 2022-03-21 PROCEDURE — 93005 ELECTROCARDIOGRAM TRACING: CPT | Mod: 59 | Performed by: PHYSICIAN ASSISTANT

## 2022-03-21 PROCEDURE — 83735 ASSAY OF MAGNESIUM: CPT | Performed by: PHYSICIAN ASSISTANT

## 2022-03-21 PROCEDURE — 99285 EMERGENCY DEPT VISIT HI MDM: CPT | Mod: 25 | Performed by: PHYSICIAN ASSISTANT

## 2022-03-21 PROCEDURE — 85379 FIBRIN DEGRADATION QUANT: CPT | Performed by: PHYSICIAN ASSISTANT

## 2022-03-21 PROCEDURE — 93010 ELECTROCARDIOGRAM REPORT: CPT | Performed by: PHYSICIAN ASSISTANT

## 2022-03-21 PROCEDURE — 84443 ASSAY THYROID STIM HORMONE: CPT | Performed by: PHYSICIAN ASSISTANT

## 2022-03-21 PROCEDURE — 85025 COMPLETE CBC W/AUTO DIFF WBC: CPT | Performed by: PHYSICIAN ASSISTANT

## 2022-03-21 PROCEDURE — 80053 COMPREHEN METABOLIC PANEL: CPT | Performed by: PHYSICIAN ASSISTANT

## 2022-03-21 PROCEDURE — 83690 ASSAY OF LIPASE: CPT | Performed by: PHYSICIAN ASSISTANT

## 2022-03-21 PROCEDURE — 82248 BILIRUBIN DIRECT: CPT | Performed by: PHYSICIAN ASSISTANT

## 2022-03-21 RX ORDER — ASPIRIN 81 MG/1
324 TABLET, CHEWABLE ORAL ONCE
Status: DISCONTINUED | OUTPATIENT
Start: 2022-03-21 | End: 2022-03-21 | Stop reason: HOSPADM

## 2022-03-21 RX ORDER — ASPIRIN 81 MG/1
162 TABLET ORAL ONCE
COMMUNITY
End: 2022-04-26

## 2022-03-21 NOTE — ED TRIAGE NOTES
Pt reports for the past week he has been having pains and funny feeling in his chest with times that he feels his heart racing, he feels like there is something wrong with his heart, has not hx of heart issues, reports he gets shortness of breath with these episodes

## 2022-03-21 NOTE — DISCHARGE INSTRUCTIONS
It was a pleasure working with you today!  I hope your condition improves rapidly!     Thankfully, all of your testing came out okay today.  There is no major strain on your heart muscle.  The remainder of your work-up did not reveal any serious health condition.  We do need to investigate further by getting you set up with the Zio Patch monitor to check your heart rhythm.  This will give you the ability to document when you feel the symptoms so that we know what your heart rhythm is at the time.  If you develop any ongoing or worsening symptoms, do not hesitate to return to the emergency department or call 911.  Specifically if you have ongoing chest pain or elevated heart rate that does not resolve within a few minutes.  Normal heart rate is below 100 bpm.

## 2022-04-06 ENCOUNTER — TELEPHONE (OUTPATIENT)
Dept: FAMILY MEDICINE | Facility: OTHER | Age: 77
End: 2022-04-06
Payer: COMMERCIAL

## 2022-04-06 DIAGNOSIS — R00.2 PALPITATIONS: Primary | ICD-10-CM

## 2022-04-06 DIAGNOSIS — I47.10 SVT (SUPRAVENTRICULAR TACHYCARDIA) (H): ICD-10-CM

## 2022-04-06 NOTE — TELEPHONE ENCOUNTER
Spoke with patient and advised of message below. Patient stated understanding and no further questions.    Please call patient. We can discuss his holter at visit upcoming and I'd keep the visit with myself.  He did have a few episodes of very fast heart rate which is what I'm thinking is making him feel symptomatic.  I'd recommend they avoid caffeine if possible. I put a referral into cardiology as well that I'd like them to schedule, should be getting a call to schedule.   See them next week.     MADIHA COHEN MA

## 2022-04-06 NOTE — CONFIDENTIAL NOTE
Please call patient. We can discuss his holter at visit upcoming and I'd keep the visit with myself.  He did have a few episodes of very fast heart rate which is what I'm thinking is making him feel symptomatic.  I'd recommend they avoid caffeine if possible. I put a referral into cardiology as well that I'd like them to schedule, should be getting a call to schedule.   See them next week.     Jarrett Ibarra PA-C

## 2022-04-12 NOTE — PROGRESS NOTES
Assessment & Plan     SVT (supraventricular tachycardia) (H)  We reviewed his Holter Monitor. He is having more than just PVCs.  He does have EP Cardiology appointment set up so will see what they recommended.  We did discuss beta blocker option but today's resting heart rate is 60 already, his holter did show average of 64.  Consider propranolol too given his anxiety symptoms.      Gastroesophageal reflux disease without esophagitis  He has hx of acid reflux, takes Tums regularly, was on this in past and worked well for him.   - omeprazole (PRILOSEC) 40 MG DR capsule; Take 1 capsule (40 mg) by mouth daily    Anxiety  He is wanting to increase the dose today.  We will increase, goal is reduction or resolution of panic attacks. Consider Hydroxyzine PRN as well.   - sertraline (ZOLOFT) 50 MG tablet; Take 1 tablet (50 mg) by mouth daily      Return in about 4 weeks (around 5/11/2022) for Recheck pending Cardiology .    Options for treatment and follow-up care were reviewed with the patient and/or guardian. Patient and/or guardian engaged in the decision making process and verbalized understanding of the options discussed and agreed with the final plan.    Jarrett Ibarra PA-C  [facility] BONIFACIO Soto is a 76 year old who presents for the following health issues     HPI     ED/UC Followup:    Facility:  [facility]  Date of visit: 3/21/22  Reason for visit: chest pain  Current Status: same since hospital visit       - He notes that he has a history of issues for many years with anxiety.  He has been on and off Sertraline for years.  Typically will start to feel good on medication and then stop.   - When he has higher anxiety moments he'll feel the palpitations or racing heart and this is pretty typical for him. He can usually calm down, exercising or going for a walk is very helpful.   - He and his wife have had a lot of issues with their health and surgeries recently which he  thinks really ramped up his anxiety.    - He did restart his Sertraline - he tolerates this without any side effects, thinks at one point he may have been up to 75 mg.   - He has no chest pain or shortness of breath.  He still occasionally feels the palpitations but nothing more than he normally does.   - He does have appt with Cardiology EP Set up.     Review of Systems   Constitutional, HEENT, cardiovascular, pulmonary, gi and gu systems are negative, except as otherwise noted.      Objective    /78   Pulse 60   Temp 97.3  F (36.3  C) (Temporal)   Resp 16   Wt 83.9 kg (185 lb)   SpO2 98%   BMI 25.09 kg/m    Body mass index is 25.09 kg/m .  Physical Exam   GENERAL: healthy, alert and no distress  RESP: lungs clear to auscultation - no rales, rhonchi or wheezes  CV: regular rate and rhythm, normal S1 S2, no S3 or S4, no murmur, click or rub, no peripheral edema and peripheral pulses strong  MS: no gross musculoskeletal defects noted, no edema  SKIN: no suspicious lesions or rashes  PSYCH: mentation appears normal, affect normal/bright

## 2022-04-13 ENCOUNTER — OFFICE VISIT (OUTPATIENT)
Dept: FAMILY MEDICINE | Facility: OTHER | Age: 77
End: 2022-04-13
Payer: COMMERCIAL

## 2022-04-13 VITALS
HEART RATE: 60 BPM | WEIGHT: 185 LBS | BODY MASS INDEX: 25.09 KG/M2 | OXYGEN SATURATION: 98 % | TEMPERATURE: 97.3 F | RESPIRATION RATE: 16 BRPM | SYSTOLIC BLOOD PRESSURE: 134 MMHG | DIASTOLIC BLOOD PRESSURE: 78 MMHG

## 2022-04-13 DIAGNOSIS — F41.9 ANXIETY: ICD-10-CM

## 2022-04-13 DIAGNOSIS — I47.10 SVT (SUPRAVENTRICULAR TACHYCARDIA) (H): Primary | ICD-10-CM

## 2022-04-13 DIAGNOSIS — K21.9 GASTROESOPHAGEAL REFLUX DISEASE WITHOUT ESOPHAGITIS: ICD-10-CM

## 2022-04-13 PROCEDURE — 99214 OFFICE O/P EST MOD 30 MIN: CPT | Performed by: PHYSICIAN ASSISTANT

## 2022-04-13 RX ORDER — OMEPRAZOLE 40 MG/1
40 CAPSULE, DELAYED RELEASE ORAL DAILY
Qty: 90 CAPSULE | Refills: 1 | Status: SHIPPED | OUTPATIENT
Start: 2022-04-13 | End: 2023-02-14

## 2022-04-13 ASSESSMENT — PAIN SCALES - GENERAL: PAINLEVEL: NO PAIN (0)

## 2022-04-22 ENCOUNTER — TRANSFERRED RECORDS (OUTPATIENT)
Dept: HEALTH INFORMATION MANAGEMENT | Facility: CLINIC | Age: 77
End: 2022-04-22
Payer: COMMERCIAL

## 2022-04-26 ENCOUNTER — OFFICE VISIT (OUTPATIENT)
Dept: CARDIOLOGY | Facility: CLINIC | Age: 77
End: 2022-04-26
Payer: COMMERCIAL

## 2022-04-26 VITALS
BODY MASS INDEX: 26.18 KG/M2 | HEIGHT: 71 IN | HEART RATE: 60 BPM | DIASTOLIC BLOOD PRESSURE: 79 MMHG | SYSTOLIC BLOOD PRESSURE: 142 MMHG | WEIGHT: 187 LBS | OXYGEN SATURATION: 96 %

## 2022-04-26 DIAGNOSIS — I47.10 SVT (SUPRAVENTRICULAR TACHYCARDIA) (H): ICD-10-CM

## 2022-04-26 DIAGNOSIS — R00.2 PALPITATIONS: ICD-10-CM

## 2022-04-26 PROCEDURE — 99202 OFFICE O/P NEW SF 15 MIN: CPT | Performed by: INTERNAL MEDICINE

## 2022-04-26 RX ORDER — METOPROLOL TARTRATE 25 MG/1
25 TABLET, FILM COATED ORAL 2 TIMES DAILY PRN
Qty: 60 TABLET | Refills: 3 | Status: SHIPPED | OUTPATIENT
Start: 2022-04-26

## 2022-04-26 NOTE — PROGRESS NOTES
HPI and Plan:   See dictation  [id number]  Today's clinic visit entailed:  The following tests were independently interpreted by me as noted in my documentation: ecg, zio  15 minutes spent on the date of the encounter doing chart review   Provider  Link to MDM Help Grid     The level of medical decision making during this visit was of moderate complexity.      No orders of the defined types were placed in this encounter.      Orders Placed This Encounter   Medications     metoprolol tartrate (LOPRESSOR) 25 MG tablet     Sig: Take 1 tablet (25 mg) by mouth 2 times daily as needed (palpitations)     Dispense:  60 tablet     Refill:  3       Medications Discontinued During This Encounter   Medication Reason     aspirin 81 MG EC tablet Therapy completed         Encounter Diagnoses   Name Primary?     Palpitations      SVT (supraventricular tachycardia) (H)        CURRENT MEDICATIONS:  Current Outpatient Medications   Medication Sig Dispense Refill     metoprolol tartrate (LOPRESSOR) 25 MG tablet Take 1 tablet (25 mg) by mouth 2 times daily as needed (palpitations) 60 tablet 3     omeprazole (PRILOSEC) 40 MG DR capsule Take 1 capsule (40 mg) by mouth daily 90 capsule 1     pravastatin (PRAVACHOL) 80 MG tablet Take 1 tablet (80 mg) by mouth At Bedtime (Patient taking differently: Take 40 mg by mouth At Bedtime) 90 tablet 3     sertraline (ZOLOFT) 50 MG tablet Take 1 tablet (50 mg) by mouth daily 90 tablet 1       ALLERGIES   No Known Allergies    PAST MEDICAL HISTORY:  Past Medical History:   Diagnosis Date     Anxiety disorder     lifetime     Arthritis neck and knee     History of adenomatous polyp of colon     tubular adenoma x1, next scope 2028       PAST SURGICAL HISTORY:  Past Surgical History:   Procedure Laterality Date     ARTHROSCOPY KNEE      2015     COLONOSCOPY      2010,follow up due 2015 -- s/p polyp removal --> next in 2018     COLONOSCOPY  09/10/2015    9/10/2015,34316.0,[place] COLONOSCOPY REMOVE JULIO POLYP  "LESN SNARE     COLONOSCOPY  2018    splenic flexure polyp     COLONOSCOPY N/A 2018    1 small tubular adenoma follow up      SHOULDER SURGERY Right 2016       FAMILY HISTORY:  Family History   Problem Relation Age of Onset     Osteoporosis Mother         Osteoporosis     Genetic Disorder Other         Genetic,Mother - Diabetes.  No FHx of CVA, No early family HX of CAD, no known cancers       SOCIAL HISTORY:  Social History     Socioeconomic History     Marital status:      Spouse name: Pamela   Tobacco Use     Smoking status: Former Smoker     Packs/day: 0.50     Years: 20.00     Pack years: 10.00     Types: Cigarettes     Quit date: 7/10/1985     Years since quittin.8     Smokeless tobacco: Never Used   Vaping Use     Vaping Use: Never used   Substance and Sexual Activity     Alcohol use: No     Alcohol/week: 11.7 standard drinks     Drug use: No     Sexual activity: Yes     Partners: Female   Social History Narrative    , lives with wife, retired .       Review of Systems:  Skin:          Eyes:         ENT:         Respiratory:          Cardiovascular:         Gastroenterology:        Genitourinary:         Musculoskeletal:         Neurologic:         Psychiatric:         Heme/Lymph/Imm:         Endocrine:           Physical Exam:  Vitals: BP (!) 142/79   Pulse 60   Ht 1.797 m (5' 10.75\")   Wt 84.8 kg (187 lb)   SpO2 96%   BMI 26.27 kg/m      Constitutional:  cooperative, alert and oriented, well developed, well nourished, in no acute distress thin      Skin:  warm and dry to the touch, no apparent skin lesions or masses noted          Head:  normocephalic, no masses or lesions        Eyes:  pupils equal and round, conjunctivae and lids unremarkable, sclera white, no xanthalasma, EOMS intact, no nystagmus        Lymph:No Cervical lymphadenopathy present     ENT:  no pallor or cyanosis        Neck:  carotid pulses are full and equal bilaterally, JVP normal, no " carotid bruit        Respiratory:  normal breath sounds, clear to auscultation, normal A-P diameter, normal symmetry, normal respiratory excursion, no use of accessory muscles         Cardiac: regular rhythm, normal S1/S2, no S3 or S4, apical impulse not displaced, no murmurs, gallops or rubs                pulses full and equal, no bruits auscultated                                        GI:  abdomen soft, non-tender, BS normoactive, no mass, no HSM, no bruits        Extremities and Muscular Skeletal:  no deformities, clubbing, cyanosis, erythema observed              Neurological:  no gross motor deficits        Psych:  Alert and Oriented x 3        CC  Jarrett Ibarra PA-C  [address] [place] [address]

## 2022-04-26 NOTE — PROGRESS NOTES
"Service Date: 04/26/2022    Thank you for allowing me to participate in the care of your delightful patient.  As you know, Charles is a 76-year-old gentleman, previously healthy, who has been noting occasional feeling of doom and somewhat of an anxious feeling.  He denies having \"racing heartbeat\" per se, but just a sensation that would come out of the blue.  He subsequently went to a local ED, but by that time, his symptoms resolved for the most part.  He was sent home on a 1-week Zio Patch monitor.  I was asked to see the patient for further evaluation.    During the monitoring, the patient had a few episodes, but he forgot to record it.  He had a total of 4 episodes of PSVT with the longest run lasting for 9 beats only with average rate of about 106 beats per minute.  Otherwise, his average rate of 62 per minute in sinus with a lowest of 43 around 11:30 p.m. when he was about to sleep.  Otherwise, the patient denies having chest pain, chest discomfort.  He is very active.    It is unclear what arrhythmias the patient is having responsible for his feeling of \"doom\" but certainly could be from PSVT from a description of it and given that we have some documentation, even though it is quite short lived.  If that is the case, I reassured Charles that his PSVT is not considered serious.  I went over Valsalva maneuver for the patient in the event it be long lasting.  If it does not work, I prescribed him metoprolol tartrate 25 mg to be taken b.i.d. on a p.r.n. basis for these episodes in the future. Should the patient have more episodes, then we can have him take metoprolol on a scheduled basis and consider an EP study and potential ablation as a choice of last resort.  If the patient is doing quite well, I can see him in the [facility] a year from now or one of my assistants up there.      Paul Burns MD        D: 04/26/2022   T: 04/26/2022   MT: jerica    Name:     GUSTAVO SWANSON  MRN:      [id number]        Account:  "     [id number]   :      1945           Service Date: 2022       Document: [id number]

## 2022-04-26 NOTE — LETTER
4/26/2022    Emmanuelle Sylvester MD, MD  [address] [facility] [address]    RE: Lauro Pritchard       Dear Colleague,     I had the pleasure of seeing Lauro Pritchard in the [facility].  HPI and Plan:   See dictation  [id number]  Today's clinic visit entailed:  The following tests were independently interpreted by me as noted in my documentation: ecg, zio  15 minutes spent on the date of the encounter doing chart review   Provider  Link to MDM Help Grid     The level of medical decision making during this visit was of moderate complexity.      No orders of the defined types were placed in this encounter.      Orders Placed This Encounter   Medications     metoprolol tartrate (LOPRESSOR) 25 MG tablet     Sig: Take 1 tablet (25 mg) by mouth 2 times daily as needed (palpitations)     Dispense:  60 tablet     Refill:  3       Medications Discontinued During This Encounter   Medication Reason     aspirin 81 MG EC tablet Therapy completed         Encounter Diagnoses   Name Primary?     Palpitations      SVT (supraventricular tachycardia) (H)        CURRENT MEDICATIONS:  Current Outpatient Medications   Medication Sig Dispense Refill     metoprolol tartrate (LOPRESSOR) 25 MG tablet Take 1 tablet (25 mg) by mouth 2 times daily as needed (palpitations) 60 tablet 3     omeprazole (PRILOSEC) 40 MG DR capsule Take 1 capsule (40 mg) by mouth daily 90 capsule 1     pravastatin (PRAVACHOL) 80 MG tablet Take 1 tablet (80 mg) by mouth At Bedtime (Patient taking differently: Take 40 mg by mouth At Bedtime) 90 tablet 3     sertraline (ZOLOFT) 50 MG tablet Take 1 tablet (50 mg) by mouth daily 90 tablet 1       ALLERGIES   No Known Allergies    PAST MEDICAL HISTORY:  Past Medical History:   Diagnosis Date     Anxiety disorder     lifetime     Arthritis neck and knee     History of adenomatous polyp of colon     tubular adenoma x1, next scope 2028       PAST SURGICAL HISTORY:  Past Surgical History:   Procedure Laterality Date  "    ARTHROSCOPY KNEE           COLONOSCOPY      ,follow up due  -- s/p polyp removal --> next in 2018     COLONOSCOPY  09/10/2015    9/10/2015,81183.0,ME COLONOSCOPY REMOVE JULIO POLYP LESN SNARE     COLONOSCOPY  2018    splenic flexure polyp     COLONOSCOPY N/A 2018    1 small tubular adenoma follow up      SHOULDER SURGERY Right 2016       FAMILY HISTORY:  Family History   Problem Relation Age of Onset     Osteoporosis Mother         Osteoporosis     Genetic Disorder Other         Genetic,Mother - Diabetes.  No FHx of CVA, No early family HX of CAD, no known cancers       SOCIAL HISTORY:  Social History     Socioeconomic History     Marital status:      Spouse name: Pamela   Tobacco Use     Smoking status: Former Smoker     Packs/day: 0.50     Years: 20.00     Pack years: 10.00     Types: Cigarettes     Quit date: 7/10/1985     Years since quittin.8     Smokeless tobacco: Never Used   Vaping Use     Vaping Use: Never used   Substance and Sexual Activity     Alcohol use: No     Alcohol/week: 11.7 standard drinks     Drug use: No     Sexual activity: Yes     Partners: Female   Social History Narrative    , lives with wife, retired .       Review of Systems:  Skin:          Eyes:         ENT:         Respiratory:          Cardiovascular:         Gastroenterology:        Genitourinary:         Musculoskeletal:         Neurologic:         Psychiatric:         Heme/Lymph/Imm:         Endocrine:           Physical Exam:  Vitals: BP (!) 142/79   Pulse 60   Ht 1.797 m (5' 10.75\")   Wt 84.8 kg (187 lb)   SpO2 96%   BMI 26.27 kg/m      Constitutional:  cooperative, alert and oriented, well developed, well nourished, in no acute distress thin      Skin:  warm and dry to the touch, no apparent skin lesions or masses noted          Head:  normocephalic, no masses or lesions        Eyes:  pupils equal and round, conjunctivae and lids unremarkable, sclera white, no " xanthalasma, EOMS intact, no nystagmus        Lymph:No Cervical lymphadenopathy present     ENT:  no pallor or cyanosis        Neck:  carotid pulses are full and equal bilaterally, JVP normal, no carotid bruit        Respiratory:  normal breath sounds, clear to auscultation, normal A-P diameter, normal symmetry, normal respiratory excursion, no use of accessory muscles         Cardiac: regular rhythm, normal S1/S2, no S3 or S4, apical impulse not displaced, no murmurs, gallops or rubs                pulses full and equal, no bruits auscultated                                        GI:  abdomen soft, non-tender, BS normoactive, no mass, no HSM, no bruits        Extremities and Muscular Skeletal:  no deformities, clubbing, cyanosis, erythema observed              Neurological:  no gross motor deficits        Psych:  Alert and Oriented x 3        CC  Jarrett Ibarra PA-C  [address] [place] [address]    Thank you for allowing me to participate in the care of your patient.      Sincerely,     Paul Mtz MD     [facility]

## 2022-08-01 ENCOUNTER — MYC MEDICAL ADVICE (OUTPATIENT)
Dept: FAMILY MEDICINE | Facility: OTHER | Age: 77
End: 2022-08-01

## 2022-08-01 NOTE — TELEPHONE ENCOUNTER
"Called patient.  Appointment made.  He states he's had the rash for 3 weeks.  He can't see it; wife tells him that there is a raised rash present at times.  He states it itches \"really bad\".  He reports they've tried cortisone creams, eczema lotions, and other lotions with no improvement.  He is wanting appointment.  Appointment made for Tuesday.  Nereida SILVEIRA RN  "

## 2022-08-02 ENCOUNTER — OFFICE VISIT (OUTPATIENT)
Dept: FAMILY MEDICINE | Facility: OTHER | Age: 77
End: 2022-08-02
Payer: COMMERCIAL

## 2022-08-02 VITALS
WEIGHT: 186.5 LBS | TEMPERATURE: 97.9 F | HEART RATE: 60 BPM | SYSTOLIC BLOOD PRESSURE: 130 MMHG | BODY MASS INDEX: 25.26 KG/M2 | RESPIRATION RATE: 16 BRPM | OXYGEN SATURATION: 97 % | HEIGHT: 72 IN | DIASTOLIC BLOOD PRESSURE: 72 MMHG

## 2022-08-02 DIAGNOSIS — R21 ERYTHEMATOUS RASH: Primary | ICD-10-CM

## 2022-08-02 PROCEDURE — 99213 OFFICE O/P EST LOW 20 MIN: CPT | Performed by: PHYSICIAN ASSISTANT

## 2022-08-02 RX ORDER — BETAMETHASONE DIPROPIONATE 0.05 %
OINTMENT (GRAM) TOPICAL 2 TIMES DAILY
Qty: 45 G | Refills: 0 | Status: SHIPPED | OUTPATIENT
Start: 2022-08-02 | End: 2023-02-14

## 2022-08-02 RX ORDER — HYDROXYZINE HYDROCHLORIDE 25 MG/1
25 TABLET, FILM COATED ORAL 3 TIMES DAILY PRN
Qty: 30 TABLET | Refills: 0 | Status: SHIPPED | OUTPATIENT
Start: 2022-08-02 | End: 2023-02-14

## 2022-08-02 ASSESSMENT — PAIN SCALES - GENERAL: PAINLEVEL: NO PAIN (0)

## 2022-08-02 NOTE — PROGRESS NOTES
Assessment & Plan     Erythematous rash  There are a few areas consistent with dermatitis, the description of symptoms in the antecubital fossa also makes you think dermatitis/eczema.  Do not see any rashes consistent with Tinea but something to consider.  Will treat with topical steroid, discussed how to use, apply thin layer, do not use on face, do not use > 14 days consecutively. Will also have them start using emollient as well.  Encouraged to switch products to scent/dye free if able. Started on Zyrtec 20 mg daily for 1-2 weeks and can use Hydroxyzine if needed for intense itching especially at night.   - hydrOXYzine (ATARAX) 25 MG tablet; Take 1 tablet (25 mg) by mouth 3 times daily as needed for itching  - betamethasone dipropionate (DIPROSONE) 0.05 % external ointment; Apply topically 2 times daily Max Use 14 days consecutively      Return in about 2 weeks (around 8/16/2022) for If not improving, sooner if worse or new concerns.    Options for treatment and follow-up care were reviewed with the patient and/or guardian. Patient and/or guardian engaged in the decision making process and verbalized understanding of the options discussed and agreed with the final plan.    MEL Mcbride [facility] BONIFACIO Soto is a 77 year old, presenting for the following health issues:  Derm Problem (Rash on back  itches)      History of Present Illness       Reason for visit:  Back itching  Symptom onset:  1-2 weeks ago  Symptoms include:  Itching on back  Symptom intensity:  Severe  Symptom progression:  Staying the same  Had these symptoms before:  No  What makes it worse:  Don t know  What makes it better:  Cold water or ice pac    He eats 2-3 servings of fruits and vegetables daily.He consumes 1 sweetened beverage(s) daily.He exercises with enough effort to increase his heart rate 9 or less minutes per day.  He exercises with enough effort to increase his heart rate 6 days per  "week.   He is taking medications regularly.     Started a couple of weeks ago. It is just itchy on the back, red rash on and off. It seems to come and go, worse with warmth, sweating.  They tried hydrocortisone and an eczema cream but it seemed to make it worse.  Cool washcloth was helpful.  Also sometimes gets this in his elbow crease as well.     No changes in diet, medications, skin products, detergents.  No other rashes elsewhere.     Review of Systems   Constitutional, HEENT, skin, msk systems are negative, except as otherwise noted.      Objective    /72 (BP Location: Right arm, Patient Position: Sitting, Cuff Size: Adult Large)   Pulse 60   Temp 97.9  F (36.6  C) (Temporal)   Resp 16   Ht 1.816 m (5' 11.5\")   Wt 84.6 kg (186 lb 8 oz)   SpO2 97%   BMI 25.65 kg/m    Body mass index is 25.65 kg/m .  Physical Exam   GENERAL: healthy, alert and no distress  MS: no gross musculoskeletal defects noted, no edema  SKIN: Back there are a few scattered macular dry areas  PSYCH: mentation appears normal, affect normal/bright                    .  ..  "

## 2022-08-02 NOTE — PATIENT INSTRUCTIONS
Betamethasone ointment - Steroid.  Apply thin layer to any of the itchy areas twice daily for up to 14 days in a row, take a week break before restarting. DO NOT USE ON FACE  Over the top of the steroid you can apply Emollients - Vaseline/Aquapho, Cetaphil, Cerave, Eucerin, Vanicream.   Try to switch any products that touch your skin to scent and dye free.   Zyrtec/Cetirizine Generic - take 2 tablets daily for 1-2 weeks.   If needed at night or daytime for excessive itching take Hydroxyzine 1 tablet, this can make you feel tired.

## 2022-09-26 DIAGNOSIS — E78.2 MIXED HYPERLIPIDEMIA: ICD-10-CM

## 2022-09-26 RX ORDER — PRAVASTATIN SODIUM 80 MG/1
TABLET ORAL
Qty: 90 TABLET | Refills: 0 | Status: SHIPPED | OUTPATIENT
Start: 2022-09-26 | End: 2022-12-26

## 2022-12-04 ENCOUNTER — HEALTH MAINTENANCE LETTER (OUTPATIENT)
Age: 77
End: 2022-12-04

## 2022-12-25 DIAGNOSIS — E78.2 MIXED HYPERLIPIDEMIA: ICD-10-CM

## 2022-12-26 RX ORDER — PRAVASTATIN SODIUM 80 MG/1
TABLET ORAL
Qty: 90 TABLET | Refills: 0 | Status: SHIPPED | OUTPATIENT
Start: 2022-12-26 | End: 2023-02-14 | Stop reason: SINTOL

## 2023-02-07 ASSESSMENT — ENCOUNTER SYMPTOMS
HEMATOCHEZIA: 0
DIARRHEA: 0
JOINT SWELLING: 0
NAUSEA: 0
CHILLS: 0
DYSURIA: 0
SORE THROAT: 0
EYE PAIN: 0
FREQUENCY: 0
ARTHRALGIAS: 0
ABDOMINAL PAIN: 0
HEMATURIA: 0
HEARTBURN: 0
WEAKNESS: 0
DIZZINESS: 0
COUGH: 0
NERVOUS/ANXIOUS: 0
MYALGIAS: 0
CONSTIPATION: 0
SHORTNESS OF BREATH: 0
FEVER: 0
PARESTHESIAS: 0
PALPITATIONS: 0
HEADACHES: 0

## 2023-02-07 ASSESSMENT — ACTIVITIES OF DAILY LIVING (ADL): CURRENT_FUNCTION: NO ASSISTANCE NEEDED

## 2023-02-14 ENCOUNTER — OFFICE VISIT (OUTPATIENT)
Dept: FAMILY MEDICINE | Facility: OTHER | Age: 78
End: 2023-02-14
Payer: COMMERCIAL

## 2023-02-14 VITALS
OXYGEN SATURATION: 97 % | DIASTOLIC BLOOD PRESSURE: 70 MMHG | RESPIRATION RATE: 18 BRPM | TEMPERATURE: 97.9 F | WEIGHT: 186.5 LBS | HEIGHT: 71 IN | BODY MASS INDEX: 26.11 KG/M2 | SYSTOLIC BLOOD PRESSURE: 124 MMHG | HEART RATE: 64 BPM

## 2023-02-14 DIAGNOSIS — E78.2 MIXED HYPERLIPIDEMIA: ICD-10-CM

## 2023-02-14 DIAGNOSIS — Z00.00 ENCOUNTER FOR MEDICARE ANNUAL WELLNESS EXAM: Primary | ICD-10-CM

## 2023-02-14 DIAGNOSIS — F41.9 ANXIETY: ICD-10-CM

## 2023-02-14 DIAGNOSIS — Z12.5 SCREENING FOR PROSTATE CANCER: ICD-10-CM

## 2023-02-14 DIAGNOSIS — I47.10 SVT (SUPRAVENTRICULAR TACHYCARDIA) (H): ICD-10-CM

## 2023-02-14 LAB
ALBUMIN SERPL BCG-MCNC: 4 G/DL (ref 3.5–5.2)
ALP SERPL-CCNC: 56 U/L (ref 40–129)
ALT SERPL W P-5'-P-CCNC: 27 U/L (ref 10–50)
ANION GAP SERPL CALCULATED.3IONS-SCNC: 9 MMOL/L (ref 7–15)
AST SERPL W P-5'-P-CCNC: 29 U/L (ref 10–50)
BILIRUB SERPL-MCNC: 0.5 MG/DL
BUN SERPL-MCNC: 17.7 MG/DL (ref 8–23)
CALCIUM SERPL-MCNC: 9 MG/DL (ref 8.8–10.2)
CHLORIDE SERPL-SCNC: 102 MMOL/L (ref 98–107)
CHOLEST SERPL-MCNC: 186 MG/DL
CREAT SERPL-MCNC: 0.85 MG/DL (ref 0.67–1.17)
DEPRECATED HCO3 PLAS-SCNC: 27 MMOL/L (ref 22–29)
GFR SERPL CREATININE-BSD FRML MDRD: 89 ML/MIN/1.73M2
GLUCOSE SERPL-MCNC: 103 MG/DL (ref 70–99)
HDLC SERPL-MCNC: 52 MG/DL
LDLC SERPL CALC-MCNC: 116 MG/DL
NONHDLC SERPL-MCNC: 134 MG/DL
POTASSIUM SERPL-SCNC: 4.3 MMOL/L (ref 3.4–5.3)
PROT SERPL-MCNC: 7.4 G/DL (ref 6.4–8.3)
PSA SERPL-MCNC: 1.45 NG/ML (ref 0–6.5)
SODIUM SERPL-SCNC: 138 MMOL/L (ref 136–145)
TRIGL SERPL-MCNC: 89 MG/DL

## 2023-02-14 PROCEDURE — G0103 PSA SCREENING: HCPCS | Performed by: PHYSICIAN ASSISTANT

## 2023-02-14 PROCEDURE — G0439 PPPS, SUBSEQ VISIT: HCPCS | Performed by: PHYSICIAN ASSISTANT

## 2023-02-14 PROCEDURE — 80061 LIPID PANEL: CPT | Performed by: PHYSICIAN ASSISTANT

## 2023-02-14 PROCEDURE — 80053 COMPREHEN METABOLIC PANEL: CPT | Performed by: PHYSICIAN ASSISTANT

## 2023-02-14 PROCEDURE — 99214 OFFICE O/P EST MOD 30 MIN: CPT | Mod: 25 | Performed by: PHYSICIAN ASSISTANT

## 2023-02-14 PROCEDURE — 36415 COLL VENOUS BLD VENIPUNCTURE: CPT | Performed by: PHYSICIAN ASSISTANT

## 2023-02-14 RX ORDER — PRAVASTATIN SODIUM 40 MG
40 TABLET ORAL DAILY
Qty: 90 TABLET | Refills: 3 | Status: SHIPPED | OUTPATIENT
Start: 2023-02-14 | End: 2024-03-20

## 2023-02-14 ASSESSMENT — ENCOUNTER SYMPTOMS
COUGH: 0
HEADACHES: 0
SHORTNESS OF BREATH: 0
FREQUENCY: 0
NAUSEA: 0
CHILLS: 0
HEMATOCHEZIA: 0
JOINT SWELLING: 0
SORE THROAT: 0
PALPITATIONS: 0
DIARRHEA: 0
NERVOUS/ANXIOUS: 0
ARTHRALGIAS: 0
HEARTBURN: 0
DIZZINESS: 0
DYSURIA: 0
WEAKNESS: 0
MYALGIAS: 0
FEVER: 0
ABDOMINAL PAIN: 0
HEMATURIA: 0
PARESTHESIAS: 0
CONSTIPATION: 0
EYE PAIN: 0

## 2023-02-14 ASSESSMENT — ACTIVITIES OF DAILY LIVING (ADL): CURRENT_FUNCTION: NO ASSISTANCE NEEDED

## 2023-02-14 ASSESSMENT — ANXIETY QUESTIONNAIRES
5. BEING SO RESTLESS THAT IT IS HARD TO SIT STILL: NOT AT ALL
GAD7 TOTAL SCORE: 0
GAD7 TOTAL SCORE: 0
2. NOT BEING ABLE TO STOP OR CONTROL WORRYING: NOT AT ALL
6. BECOMING EASILY ANNOYED OR IRRITABLE: NOT AT ALL
1. FEELING NERVOUS, ANXIOUS, OR ON EDGE: NOT AT ALL
IF YOU CHECKED OFF ANY PROBLEMS ON THIS QUESTIONNAIRE, HOW DIFFICULT HAVE THESE PROBLEMS MADE IT FOR YOU TO DO YOUR WORK, TAKE CARE OF THINGS AT HOME, OR GET ALONG WITH OTHER PEOPLE: NOT DIFFICULT AT ALL
7. FEELING AFRAID AS IF SOMETHING AWFUL MIGHT HAPPEN: NOT AT ALL
3. WORRYING TOO MUCH ABOUT DIFFERENT THINGS: NOT AT ALL

## 2023-02-14 ASSESSMENT — PAIN SCALES - GENERAL: PAINLEVEL: NO PAIN (0)

## 2023-02-14 ASSESSMENT — PATIENT HEALTH QUESTIONNAIRE - PHQ9
5. POOR APPETITE OR OVEREATING: NOT AT ALL
SUM OF ALL RESPONSES TO PHQ QUESTIONS 1-9: 0

## 2023-02-14 NOTE — PROGRESS NOTES
"SUBJECTIVE:   Don is a 77 year old who presents for Preventive Visit.  Patient has been advised of split billing requirements and indicates understanding: Yes  Are you in the first 12 months of your Medicare coverage?  No    Healthy Habits:     In general, how would you rate your overall health?  Good    Frequency of exercise:  4-5 days/week    Duration of exercise:  45-60 minutes    Do you usually eat at least 4 servings of fruit and vegetables a day, include whole grains    & fiber and avoid regularly eating high fat or \"junk\" foods?  Yes    Taking medications regularly:  Yes    Medication side effects:  None    Ability to successfully perform activities of daily living:  No assistance needed    Home Safety:  No safety concerns identified    Hearing Impairment:  No hearing concerns    In the past 6 months, have you been bothered by leaking of urine?  No    In general, how would you rate your overall mental or emotional health?  Good      PHQ-2 Total Score: 0    Additional concerns today:  No      Have you ever done Advance Care Planning? (For example, a Health Directive, POLST, or a discussion with a medical provider or your loved ones about your wishes): No, advance care planning information given to patient to review.  Advanced care planning was discussed at today's visit.       Fall risk  Fallen 2 or more times in the past year?: No  Any fall with injury in the past year?: No    Cognitive Screening   1) Repeat 3 items (Leader, Season, Table)    2) Clock draw: NORMAL  3) 3 item recall: Recalls 2 objects   Results: NORMAL clock, 1-2 items recalled: COGNITIVE IMPAIRMENT LESS LIKELY    Mini-CogTM Copyright AMIRAH Mina. Licensed by the author for use in [facility]; reprinted with permission (juan [email or web address]@.[email or web address]). All rights reserved.      Do you have sleep apnea, excessive snoring or daytime drowsiness?: no    Reviewed and updated as needed this visit by clinical staff   Tobacco  Allergies  Meds  Problems  " Med Hx  Surg Hx  Fam Hx          Reviewed and updated as needed this visit by Provider   Tobacco  Allergies  Meds  Problems  Med Hx  Surg Hx  Fam Hx         Social History     Tobacco Use     Smoking status: Former     Packs/day: 0.50     Years: 20.00     Pack years: 10.00     Types: Cigarettes     Quit date: 7/10/1985     Years since quittin.6     Smokeless tobacco: Never   Substance Use Topics     Alcohol use: No     Alcohol/week: 11.7 standard drinks         Alcohol Use 2023   Prescreen: >3 drinks/day or >7 drinks/week? Not Applicable   Prescreen: >3 drinks/day or >7 drinks/week? -           Hyperlipidemia Follow-Up      Are you regularly taking any medication or supplement to lower your cholesterol?   Yes- Pravastatin    Are you having muscle aches or other side effects that you think could be caused by your cholesterol lowering medication?  Yes- patient has been cutting tablets in half, original Rx was making him to tired    Depression and Anxiety Follow-Up    How are you doing with your depression since your last visit? No change    How are you doing with your anxiety since your last visit?  No change    Are you having other symptoms that might be associated with depression or anxiety? No    Have you had a significant life event? No     Do you have any concerns with your use of alcohol or other drugs? No    Social History     Tobacco Use     Smoking status: Former     Packs/day: 0.50     Years: 20.00     Pack years: 10.00     Types: Cigarettes     Quit date: 7/10/1985     Years since quittin.6     Smokeless tobacco: Never   Vaping Use     Vaping Use: Never used   Substance Use Topics     Alcohol use: No     Alcohol/week: 11.7 standard drinks     Drug use: No     PHQ 2019   PHQ-9 Total Score 0 0 0   Q9: Thoughts of better off dead/self-harm past 2 weeks Not at all Not at all Not at all     JOSE LUIS-7 SCORE 2019   Total Score - 0 (minimal  anxiety) -   Total Score 0 0 0     Last PHQ-9 2/14/2023   1.  Little interest or pleasure in doing things 0   2.  Feeling down, depressed, or hopeless 0   3.  Trouble falling or staying asleep, or sleeping too much 0   4.  Feeling tired or having little energy 0   5.  Poor appetite or overeating 0   6.  Feeling bad about yourself 0   7.  Trouble concentrating 0   8.  Moving slowly or restless 0   Q9: Thoughts of better off dead/self-harm past 2 weeks 0   PHQ-9 Total Score 0   Difficulty at work, home, or with people Not difficult at all     JOSE LUIS-7  2/14/2023   1. Feeling nervous, anxious, or on edge 0   2. Not being able to stop or control worrying 0   3. Worrying too much about different things 0   4. Trouble relaxing 0   5. Being so restless that it is hard to sit still 0   6. Becoming easily annoyed or irritable 0   7. Feeling afraid, as if something awful might happen 0   JOSE LUIS-7 Total Score 0   If you checked any problems, how difficult have they made it for you to do your work, take care of things at home, or get along with other people? Not difficult at all       Suicide Assessment Five-step Evaluation and Treatment (SAFE-T)      Current providers sharing in care for this patient include:   Patient Care Team:  Emmanuelle Sylvester MD as PCP - General (Family Practice)  Jarrett Ibarra PA-C as Assigned PCP  Paul Burns MD as Assigned Heart and Vascular Provider    The following health maintenance items are reviewed in Epic and correct as of today:  Health Maintenance   Topic Date Due     ZOSTER IMMUNIZATION (2 of 3) 03/16/2016     MEDICARE ANNUAL WELLNESS VISIT  02/14/2024     LIPID  02/14/2024     ANNUAL REVIEW OF HM ORDERS  02/14/2024     FALL RISK ASSESSMENT  02/14/2024     DTAP/TDAP/TD IMMUNIZATION (2 - Td or Tdap) 09/22/2024     ADVANCE CARE PLANNING  02/14/2028     COLORECTAL CANCER SCREENING  09/17/2028     PHQ-2 (once per calendar year)  Completed     Pneumococcal Vaccine: 65+ Years  Completed      COVID-19 Vaccine  Completed     HEPATITIS C SCREENING  Addressed     IPV IMMUNIZATION  Aged Out     MENINGITIS IMMUNIZATION  Aged Out     INFLUENZA VACCINE  Discontinued     BP Readings from Last 3 Encounters:   23 124/70   22 130/72   22 (!) 142/79    Wt Readings from Last 3 Encounters:   23 84.6 kg (186 lb 8 oz)   22 84.6 kg (186 lb 8 oz)   22 84.8 kg (187 lb)                  Patient Active Problem List   Diagnosis     Arthritis of right acromioclavicular joint     Complete tear of right rotator cuff     Health care maintenance     History of colon polyps, tubular adenoma x1, next scope      History of prediabetes     History of tobacco use     Mixed hyperlipidemia     Chronic neck pain     Sigmoid diverticulosis     Neuroforaminal stenosis of cervical spine     Cervical stenosis of spinal canal     Past Surgical History:   Procedure Laterality Date     ARTHROSCOPY KNEE           COLONOSCOPY      ,follow up due  -- s/p polyp removal --> next in 2018     COLONOSCOPY  09/10/2015    9/10/2015,00626.0,IA COLONOSCOPY REMOVE JULIO POLYP LESN SNARE     COLONOSCOPY  2018    splenic flexure polyp     COLONOSCOPY N/A 2018    1 small tubular adenoma follow up      SHOULDER SURGERY Right 2016       Social History     Tobacco Use     Smoking status: Former     Packs/day: 0.50     Years: 20.00     Pack years: 10.00     Types: Cigarettes     Quit date: 7/10/1985     Years since quittin.6     Smokeless tobacco: Never   Substance Use Topics     Alcohol use: No     Alcohol/week: 11.7 standard drinks     Family History   Problem Relation Age of Onset     Osteoporosis Mother         Osteoporosis     Genetic Disorder Other         Genetic,Mother - Diabetes.  No FHx of CVA, No early family HX of CAD, no known cancers         Current Outpatient Medications   Medication Sig Dispense Refill     pravastatin (PRAVACHOL) 40 MG tablet Take 1 tablet (40 mg) by mouth daily  "90 tablet 3     sertraline (ZOLOFT) 50 MG tablet Take 1 tablet (50 mg) by mouth daily 90 tablet 3     metoprolol tartrate (LOPRESSOR) 25 MG tablet Take 1 tablet (25 mg) by mouth 2 times daily as needed (palpitations) (Patient not taking: Reported on 8/2/2022) 60 tablet 3     No Known Allergies          Review of Systems   Constitutional: Negative for chills and fever.   HENT: Negative for congestion, ear pain, hearing loss and sore throat.    Eyes: Negative for pain and visual disturbance.   Respiratory: Negative for cough and shortness of breath.    Cardiovascular: Negative for chest pain, palpitations and peripheral edema.   Gastrointestinal: Negative for abdominal pain, constipation, diarrhea, heartburn, hematochezia and nausea.   Genitourinary: Negative for dysuria, frequency, genital sores, hematuria, impotence, penile discharge and urgency.   Musculoskeletal: Negative for arthralgias, joint swelling and myalgias.   Skin: Negative for rash.   Neurological: Negative for dizziness, weakness, headaches and paresthesias.   Psychiatric/Behavioral: Negative for mood changes. The patient is not nervous/anxious.          OBJECTIVE:   /70   Pulse 64   Temp 97.9  F (36.6  C) (Temporal)   Resp 18   Ht 1.796 m (5' 10.71\")   Wt 84.6 kg (186 lb 8 oz)   SpO2 97%   BMI 26.23 kg/m   Estimated body mass index is 26.23 kg/m  as calculated from the following:    Height as of this encounter: 1.796 m (5' 10.71\").    Weight as of this encounter: 84.6 kg (186 lb 8 oz).  Physical Exam  GENERAL: healthy, alert and no distress  EYES: Eyes grossly normal to inspection, PERRL and conjunctivae and sclerae normal  HENT: ear canals and TM's normal, nose and mouth without ulcers or lesions  NECK: no adenopathy, no asymmetry, masses, or scars and thyroid normal to palpation  RESP: lungs clear to auscultation - no rales, rhonchi or wheezes  CV: regular rate and rhythm, normal S1 S2, no S3 or S4, no murmur, click or rub, no " peripheral edema and peripheral pulses strong  ABDOMEN: soft, nontender, no hepatosplenomegaly, no masses and bowel sounds normal  MS: no gross musculoskeletal defects noted, no edema  SKIN: no suspicious lesions or rashes  NEURO: Normal strength and tone, mentation intact and speech normal  PSYCH: mentation appears normal, affect normal/bright    Diagnostic Test Results:  Labs reviewed in Epic  Results for orders placed or performed in visit on 02/14/23 (from the past 24 hour(s))   PSA, screen   Result Value Ref Range    Prostate Specific Antigen Screen 1.45 0.00 - 6.50 ng/mL    Narrative    This result is obtained using the Roche Elecsys total PSA method on the deborah e601 immunoassay analyzer. Results obtained with different assay methods or kits cannot be used interchangeably.   Comprehensive metabolic panel (BMP + Alb, Alk Phos, ALT, AST, Total. Bili, TP)   Result Value Ref Range    Sodium 138 136 - 145 mmol/L    Potassium 4.3 3.4 - 5.3 mmol/L    Chloride 102 98 - 107 mmol/L    Carbon Dioxide (CO2) 27 22 - 29 mmol/L    Anion Gap 9 7 - 15 mmol/L    Urea Nitrogen 17.7 8.0 - 23.0 mg/dL    Creatinine 0.85 0.67 - 1.17 mg/dL    Calcium 9.0 8.8 - 10.2 mg/dL    Glucose 103 (H) 70 - 99 mg/dL    Alkaline Phosphatase 56 40 - 129 U/L    AST 29 10 - 50 U/L    ALT 27 10 - 50 U/L    Protein Total 7.4 6.4 - 8.3 g/dL    Albumin 4.0 3.5 - 5.2 g/dL    Bilirubin Total 0.5 <=1.2 mg/dL    GFR Estimate 89 >60 mL/min/1.73m2   Lipid panel reflex to direct LDL Non-fasting   Result Value Ref Range    Cholesterol 186 <200 mg/dL    Triglycerides 89 <150 mg/dL    Direct Measure HDL 52 >=40 mg/dL    LDL Cholesterol Calculated 116 (H) <=100 mg/dL    Non HDL Cholesterol 134 (H) <130 mg/dL    Narrative    Cholesterol  Desirable:  <200 mg/dL    Triglycerides  Normal:  Less than 150 mg/dL  Borderline High:  150-199 mg/dL  High:  200-499 mg/dL  Very High:  Greater than or equal to 500 mg/dL    Direct Measure HDL  Female:  Greater than or equal to  50 mg/dL   Male:  Greater than or equal to 40 mg/dL    LDL Cholesterol  Desirable:  <100mg/dL  Above Desirable:  100-129 mg/dL   Borderline High:  130-159 mg/dL   High:  160-189 mg/dL   Very High:  >= 190 mg/dL    Non HDL Cholesterol  Desirable:  130 mg/dL  Above Desirable:  130-159 mg/dL  Borderline High:  160-189 mg/dL  High:  190-219 mg/dL  Very High:  Greater than or equal to 220 mg/dL       ASSESSMENT / PLAN:       ICD-10-CM    1. Encounter for Medicare annual wellness exam  Z00.00       2. Mixed hyperlipidemia  E78.2 Lipid panel reflex to direct LDL Non-fasting     Comprehensive metabolic panel (BMP + Alb, Alk Phos, ALT, AST, Total. Bili, TP)     Comprehensive metabolic panel (BMP + Alb, Alk Phos, ALT, AST, Total. Bili, TP)     Lipid panel reflex to direct LDL Non-fasting     pravastatin (PRAVACHOL) 40 MG tablet      3. Screening for prostate cancer  Z12.5 PSA, screen     PSA, screen      4. SVT (supraventricular tachycardia) (H)  I47.1 Comprehensive metabolic panel (BMP + Alb, Alk Phos, ALT, AST, Total. Bili, TP)     Comprehensive metabolic panel (BMP + Alb, Alk Phos, ALT, AST, Total. Bili, TP)      5. Anxiety  F41.9 sertraline (ZOLOFT) 50 MG tablet        HLD:  - reduced dose of Pravastatin due to fatigue, currently taking 40 mg daily.   - Lipids are relatively stable, ok to stay on lower dose.   The 10-year ASCVD risk score (Brooklyn DK, et al., 2019) is: 25.8%    Values used to calculate the score:      Age: 77 years      Sex: Male      Is Non- : No      Diabetic: No      Tobacco smoker: No      Systolic Blood Pressure: 124 mmHg      Is BP treated: No      HDL Cholesterol: 52 mg/dL      Total Cholesterol: 186 mg/dL      SVT:  - Hasn't had any issues in a while, has metoprolol on hand if needed.     Mental Health:  - Doing well on Sertraline daily.     Patient has been advised of split billing requirements and indicates understanding: Yes      COUNSELING:  Reviewed preventive health  "counseling, as reflected in patient instructions      BMI:   Estimated body mass index is 26.23 kg/m  as calculated from the following:    Height as of this encounter: 1.796 m (5' 10.71\").    Weight as of this encounter: 84.6 kg (186 lb 8 oz).         He reports that he quit smoking about 37 years ago. His smoking use included cigarettes. He has a 10.00 pack-year smoking history. He has never used smokeless tobacco.      Appropriate preventive services were discussed with this patient, including applicable screening as appropriate for cardiovascular disease, diabetes, osteopenia/osteoporosis, and glaucoma.  As appropriate for age/gender, discussed screening for colorectal cancer, prostate cancer, breast cancer, and cervical cancer. Checklist reviewing preventive services available has been given to the patient.    Reviewed patients plan of care and provided an AVS. The Intermediate Care Plan ( asthma action plan, low back pain action plan, and migraine action plan) for Lauro meets the Care Plan requirement. This Care Plan has been established and reviewed with the Patient.      Jarrett Ibarra PA-C  [facility]    Identified Health Risks:  "

## 2023-02-14 NOTE — PATIENT INSTRUCTIONS
Patient Education   Personalized Prevention Plan  You are due for the preventive services outlined below.  Your care team is available to assist you in scheduling these services.  If you have already completed any of these items, please share that information with your care team to update in your medical record.  Health Maintenance Due   Topic Date Due     Zoster (Shingles) Vaccine (2 of 3) 03/16/2016     Annual Wellness Visit  10/05/2022     Cholesterol Lab  10/05/2022     ANNUAL REVIEW OF HM ORDERS  10/05/2022

## 2023-06-07 ENCOUNTER — MYC MEDICAL ADVICE (OUTPATIENT)
Dept: FAMILY MEDICINE | Facility: OTHER | Age: 78
End: 2023-06-07
Payer: COMMERCIAL

## 2023-06-07 NOTE — TELEPHONE ENCOUNTER
Started about 1-2 months ago.  He gets dizzy when he goes from a lying position to a standing position.      Did advise for him that when changing positions  From a lying to standing to do so slowly.  To sit awhile before standing.       Shortness of breath mainly when he bent over pulling weeds.  Nothing other than that.    Did advise him drinking 6-8 glasses of water a day.      Patient stated he drinks lots of coffee daily and did let him know that these kind of drinks are dehydrating.  So advised him that every time he has a cup of coffee  To drink a glass of water.    He denies any chest pain, blurred or double vision or being weak.    If symptoms got worse or he started having chest pain, weakness, blurred or double vision or having increased shortness of breath without activity to call our triage nurse line back.    Patient agreed to plan    Dianne Perez RN  [facility] ~ Registered Nurse  Clinic Triage ~ [place] [facility] [place]  June 7, 2023

## 2023-09-25 DIAGNOSIS — R21 ERYTHEMATOUS RASH: ICD-10-CM

## 2023-09-26 RX ORDER — BETAMETHASONE DIPROPIONATE 0.05 %
OINTMENT (GRAM) TOPICAL
Qty: 45 G | Refills: 0 | Status: SHIPPED | OUTPATIENT
Start: 2023-09-26 | End: 2024-02-13

## 2024-02-13 DIAGNOSIS — R21 ERYTHEMATOUS RASH: ICD-10-CM

## 2024-02-13 RX ORDER — BETAMETHASONE DIPROPIONATE 0.05 %
OINTMENT (GRAM) TOPICAL
Qty: 45 G | Refills: 0 | Status: SHIPPED | OUTPATIENT
Start: 2024-02-13 | End: 2024-03-20

## 2024-03-14 SDOH — HEALTH STABILITY: PHYSICAL HEALTH: ON AVERAGE, HOW MANY MINUTES DO YOU ENGAGE IN EXERCISE AT THIS LEVEL?: 60 MIN

## 2024-03-14 SDOH — HEALTH STABILITY: PHYSICAL HEALTH: ON AVERAGE, HOW MANY DAYS PER WEEK DO YOU ENGAGE IN MODERATE TO STRENUOUS EXERCISE (LIKE A BRISK WALK)?: 5 DAYS

## 2024-03-14 ASSESSMENT — SOCIAL DETERMINANTS OF HEALTH (SDOH): HOW OFTEN DO YOU GET TOGETHER WITH FRIENDS OR RELATIVES?: PATIENT DECLINED

## 2024-03-20 ENCOUNTER — OFFICE VISIT (OUTPATIENT)
Dept: FAMILY MEDICINE | Facility: OTHER | Age: 79
End: 2024-03-20
Payer: COMMERCIAL

## 2024-03-20 VITALS
RESPIRATION RATE: 16 BRPM | BODY MASS INDEX: 26.01 KG/M2 | HEART RATE: 60 BPM | SYSTOLIC BLOOD PRESSURE: 114 MMHG | OXYGEN SATURATION: 97 % | HEIGHT: 72 IN | DIASTOLIC BLOOD PRESSURE: 70 MMHG | TEMPERATURE: 97.6 F | WEIGHT: 192 LBS

## 2024-03-20 DIAGNOSIS — L81.9 ATYPICAL PIGMENTED SKIN LESION: ICD-10-CM

## 2024-03-20 DIAGNOSIS — R21 ERYTHEMATOUS RASH: ICD-10-CM

## 2024-03-20 DIAGNOSIS — E78.2 MIXED HYPERLIPIDEMIA: ICD-10-CM

## 2024-03-20 DIAGNOSIS — I47.10 SUPRAVENTRICULAR TACHYCARDIA (H): ICD-10-CM

## 2024-03-20 DIAGNOSIS — R73.09 ELEVATED GLUCOSE: ICD-10-CM

## 2024-03-20 DIAGNOSIS — Z12.5 SCREENING FOR PROSTATE CANCER: ICD-10-CM

## 2024-03-20 DIAGNOSIS — F41.9 ANXIETY: ICD-10-CM

## 2024-03-20 DIAGNOSIS — Z00.00 ENCOUNTER FOR MEDICARE ANNUAL WELLNESS EXAM: Primary | ICD-10-CM

## 2024-03-20 PROBLEM — K57.30 DIVERTICULOSIS OF LARGE INTESTINE: Status: ACTIVE | Noted: 2024-03-20

## 2024-03-20 LAB
ALBUMIN SERPL BCG-MCNC: 4.1 G/DL (ref 3.5–5.2)
ALP SERPL-CCNC: 54 U/L (ref 40–150)
ALT SERPL W P-5'-P-CCNC: 18 U/L (ref 0–70)
ANION GAP SERPL CALCULATED.3IONS-SCNC: 11 MMOL/L (ref 7–15)
AST SERPL W P-5'-P-CCNC: 24 U/L (ref 0–45)
BILIRUB SERPL-MCNC: 0.6 MG/DL
BUN SERPL-MCNC: 13.8 MG/DL (ref 8–23)
CALCIUM SERPL-MCNC: 8.9 MG/DL (ref 8.8–10.2)
CHLORIDE SERPL-SCNC: 104 MMOL/L (ref 98–107)
CHOLEST SERPL-MCNC: 186 MG/DL
CREAT SERPL-MCNC: 0.86 MG/DL (ref 0.67–1.17)
DEPRECATED HCO3 PLAS-SCNC: 24 MMOL/L (ref 22–29)
EGFRCR SERPLBLD CKD-EPI 2021: 89 ML/MIN/1.73M2
FASTING STATUS PATIENT QL REPORTED: YES
GLUCOSE SERPL-MCNC: 99 MG/DL (ref 70–99)
HBA1C MFR BLD: 5.8 % (ref 0–5.6)
HDLC SERPL-MCNC: 51 MG/DL
LDLC SERPL CALC-MCNC: 111 MG/DL
NONHDLC SERPL-MCNC: 135 MG/DL
POTASSIUM SERPL-SCNC: 4 MMOL/L (ref 3.4–5.3)
PROT SERPL-MCNC: 7.4 G/DL (ref 6.4–8.3)
PSA SERPL DL<=0.01 NG/ML-MCNC: 1.68 NG/ML (ref 0–6.5)
SODIUM SERPL-SCNC: 139 MMOL/L (ref 135–145)
TRIGL SERPL-MCNC: 120 MG/DL

## 2024-03-20 PROCEDURE — 36415 COLL VENOUS BLD VENIPUNCTURE: CPT | Performed by: PHYSICIAN ASSISTANT

## 2024-03-20 PROCEDURE — G0439 PPPS, SUBSEQ VISIT: HCPCS | Performed by: PHYSICIAN ASSISTANT

## 2024-03-20 PROCEDURE — 11400 EXC TR-EXT B9+MARG 0.5 CM<: CPT | Performed by: PHYSICIAN ASSISTANT

## 2024-03-20 PROCEDURE — 80061 LIPID PANEL: CPT | Performed by: PHYSICIAN ASSISTANT

## 2024-03-20 PROCEDURE — 88305 TISSUE EXAM BY PATHOLOGIST: CPT | Performed by: PATHOLOGY

## 2024-03-20 PROCEDURE — 83036 HEMOGLOBIN GLYCOSYLATED A1C: CPT | Performed by: PHYSICIAN ASSISTANT

## 2024-03-20 PROCEDURE — G0103 PSA SCREENING: HCPCS | Performed by: PHYSICIAN ASSISTANT

## 2024-03-20 PROCEDURE — 99213 OFFICE O/P EST LOW 20 MIN: CPT | Mod: 25 | Performed by: PHYSICIAN ASSISTANT

## 2024-03-20 PROCEDURE — 80053 COMPREHEN METABOLIC PANEL: CPT | Performed by: PHYSICIAN ASSISTANT

## 2024-03-20 RX ORDER — PRAVASTATIN SODIUM 40 MG
40 TABLET ORAL DAILY
Qty: 90 TABLET | Refills: 3 | Status: SHIPPED | OUTPATIENT
Start: 2024-03-20

## 2024-03-20 RX ORDER — BETAMETHASONE DIPROPIONATE 0.05 %
OINTMENT (GRAM) TOPICAL
Qty: 45 G | Refills: 1 | Status: SHIPPED | OUTPATIENT
Start: 2024-03-20

## 2024-03-20 RX ORDER — RESPIRATORY SYNCYTIAL VIRUS VACCINE 120MCG/0.5
0.5 KIT INTRAMUSCULAR ONCE
Qty: 1 EACH | Refills: 0 | Status: CANCELLED | OUTPATIENT
Start: 2024-03-20 | End: 2024-03-20

## 2024-03-20 RX ORDER — LIDOCAINE HYDROCHLORIDE AND EPINEPHRINE 10; 10 MG/ML; UG/ML
1 INJECTION, SOLUTION INFILTRATION; PERINEURAL ONCE
Status: COMPLETED | OUTPATIENT
Start: 2024-03-20 | End: 2024-03-20

## 2024-03-20 RX ADMIN — LIDOCAINE HYDROCHLORIDE AND EPINEPHRINE 0.5 ML: 10; 10 INJECTION, SOLUTION INFILTRATION; PERINEURAL at 10:42

## 2024-03-20 ASSESSMENT — ANXIETY QUESTIONNAIRES
5. BEING SO RESTLESS THAT IT IS HARD TO SIT STILL: NOT AT ALL
4. TROUBLE RELAXING: NOT AT ALL
8. IF YOU CHECKED OFF ANY PROBLEMS, HOW DIFFICULT HAVE THESE MADE IT FOR YOU TO DO YOUR WORK, TAKE CARE OF THINGS AT HOME, OR GET ALONG WITH OTHER PEOPLE?: NOT DIFFICULT AT ALL
7. FEELING AFRAID AS IF SOMETHING AWFUL MIGHT HAPPEN: NOT AT ALL
3. WORRYING TOO MUCH ABOUT DIFFERENT THINGS: SEVERAL DAYS
2. NOT BEING ABLE TO STOP OR CONTROL WORRYING: SEVERAL DAYS
1. FEELING NERVOUS, ANXIOUS, OR ON EDGE: NOT AT ALL
IF YOU CHECKED OFF ANY PROBLEMS ON THIS QUESTIONNAIRE, HOW DIFFICULT HAVE THESE PROBLEMS MADE IT FOR YOU TO DO YOUR WORK, TAKE CARE OF THINGS AT HOME, OR GET ALONG WITH OTHER PEOPLE: NOT DIFFICULT AT ALL
6. BECOMING EASILY ANNOYED OR IRRITABLE: NOT AT ALL
GAD7 TOTAL SCORE: 2
GAD7 TOTAL SCORE: 2
7. FEELING AFRAID AS IF SOMETHING AWFUL MIGHT HAPPEN: NOT AT ALL

## 2024-03-20 ASSESSMENT — PAIN SCALES - GENERAL: PAINLEVEL: NO PAIN (0)

## 2024-03-20 NOTE — PROGRESS NOTES
"Preventive Care Visit  [facility]  Jarrett Ibarra PA-C, Family Medicine  Mar 20, 2024      Assessment & Plan     Encounter for Medicare annual wellness exam  Overall doing well.     Mixed hyperlipidemia  Due for lab recheck, no concerns with medication.   - Lipid panel reflex to direct LDL Non-fasting; Future  - pravastatin (PRAVACHOL) 40 MG tablet; Take 1 tablet (40 mg) by mouth daily  - Comprehensive metabolic panel (BMP + Alb, Alk Phos, ALT, AST, Total. Bili, TP); Future  - Lipid panel reflex to direct LDL Non-fasting  - Comprehensive metabolic panel (BMP + Alb, Alk Phos, ALT, AST, Total. Bili, TP)    Erythematous rash  This helps with the itching, uses it sparingly for 1-2 days occasionally, once in the last month.   - betamethasone dipropionate (DIPROSONE) 0.05 % external ointment; APPLY TOPICALLY TO THE AFFECTED AREA TWICE DAILY. MAX USE 14 DAYS CONSECUTIVELY    Anxiety  Stable on the sertraline.   - sertraline (ZOLOFT) 50 MG tablet; Take 1 tablet (50 mg) by mouth daily    Supraventricular tachycardia  No need for metoprolol this year.  Refill as needed.     Elevated glucose  Checking due to elevated glucose in the past  - Hemoglobin A1c; Future  - Hemoglobin A1c    Screening for prostate cancer  Screening done.   - PSA, screen; Future  - PSA, screen    Atypical pigmented skin lesion  Removed lesion, suspect SK but will make sure nothing cancerous.   Discussed proper after care and symptoms infection to monitor for.   Recommended no topical steroids on this area.   - lidocaine 1% with EPINEPHrine 1:100,000 injection 1 mL  - trunk, arms, legs  - Dermatological Path Order and Indications; Standing  - Dermatological Path Order and Indications              BMI  Estimated body mass index is 26.06 kg/m  as calculated from the following:    Height as of this encounter: 1.828 m (5' 11.97\").    Weight as of this encounter: 87.1 kg (192 lb).       Counseling  Appropriate preventive services " were discussed with this patient, including applicable screening as appropriate for fall prevention, nutrition, physical activity, Tobacco-use cessation, weight loss and cognition.  Checklist reviewing preventive services available has been given to the patient.  Reviewed patient's diet, addressing concerns and/or questions.           Franny Soto is a 78 year old, presenting for the following:  Physical        3/20/2024     9:59 AM   Additional Questions   Roomed by PHI   Accompanied by NA         3/20/2024     9:59 AM   Patient Reported Additional Medications   Patient reports taking the following new medications Pt would like a prescription for omeprazole         Health Care Directive  Patient has a Health Care Directive on file  Advance care planning document is on file and is current.    HPI              3/14/2024   General Health   How would you rate your overall physical health? Good   Feel stress (tense, anxious, or unable to sleep) Patient declined         3/14/2024   Nutrition   Diet: Regular (no restrictions)         3/14/2024   Exercise   Days per week of moderate/strenous exercise 5 days   Average minutes spent exercising at this level 60 min         3/14/2024   Social Factors   Frequency of gathering with friends or relatives Patient declined   Worry food won't last until get money to buy more No   Food not last or not have enough money for food? No   Do you have housing?  Yes   Are you worried about losing your housing? No   Lack of transportation? No   Unable to get utilities (heat,electricity)? No         3/20/2024   Fall Risk   Fallen 2 or more times in the past year? No   Trouble with walking or balance? No          3/14/2024   Activities of Daily Living- Home Safety   Needs help with the following daily activites None of the above   Safety concerns in the home None of the above         3/14/2024   Dental   Dentist two times every year? (!) DECLINE         3/14/2024   Hearing Screening    Hearing concerns? None of the above         3/14/2024   Driving Risk Screening   Patient/family members have concerns about driving No         3/14/2024   General Alertness/Fatigue Screening   Have you been more tired than usual lately? No         3/14/2024   Urinary Incontinence Screening   Bothered by leaking urine in past 6 months No         3/14/2024   TB Screening   Were you born outside of the [place]? No           Today's PHQ-2 Score:       3/20/2024    10:08 AM   PHQ-2 (  [employer])   Q1: Little interest or pleasure in doing things 0   Q2: Feeling down, depressed or hopeless 0   PHQ-2 Score 0         3/14/2024   Substance Use   Alcohol more than 3/day or more than 7/wk Not Applicable   Do you have a current opioid prescription? No   How severe/bad is pain from 1 to 10? 0/10 (No Pain)   Do you use any other substances recreationally? No    (!) DECLINE     Social History     Tobacco Use    Smoking status: Former     Packs/day: 0.50     Years: 10.00     Additional pack years: 0.00     Total pack years: 5.00     Types: Cigarettes     Quit date: 7/10/1985     Years since quittin.7     Passive exposure: Past    Smokeless tobacco: Former   Vaping Use    Vaping Use: Never used   Substance Use Topics    Alcohol use: Not Currently     Alcohol/week: 11.7 standard drinks of alcohol    Drug use: Never       ASCVD Risk   The 10-year ASCVD risk score ([place] GUPTA, et al., 2019) is: 24.1%    Values used to calculate the score:      Age: 78 years      Sex: Male      Is Non- : No      Diabetic: No      Tobacco smoker: No      Systolic Blood Pressure: 114 mmHg      Is BP treated: No      HDL Cholesterol: 52 mg/dL      Total Cholesterol: 186 mg/dL            Reviewed and updated as needed this visit by Provider                    Past Medical History:   Diagnosis Date    Anxiety disorder     lifetime    Arthritis neck and knee    History of adenomatous polyp of colon     tubular adenoma x1,  next scope      Past Surgical History:   Procedure Laterality Date    ARTHROSCOPY KNEE          COLONOSCOPY      ,follow up due  -- s/p polyp removal --> next in     COLONOSCOPY  09/10/2015    9/10/2015,[address]395.0,[place] COL[place]OS[place] REMOVE JULIO POLYP LESN SNARE    COLONOSCOPY  2018    splenic flexure polyp    COLONOSCOPY N/A 2018    1 small tubular adenoma follow up     SHOULDER SURGERY Right 2016    SOFT TISSUE SURGERY       BP Readings from Last 3 Encounters:   24 114/70   23 124/70   22 130/72    Wt Readings from Last 3 Encounters:   24 87.1 kg (192 lb)   23 84.6 kg (186 lb 8 oz)   22 84.6 kg (186 lb 8 oz)                  Patient Active Problem List   Diagnosis    Arthritis of right acromioclavicular joint    Complete tear of right rotator cuff    Health care maintenance    History of colon polyps, tubular adenoma x1, next scope     History of prediabetes    History of tobacco use    Mixed hyperlipidemia    Chronic neck pain    Sigmoid diverticulosis    Neuroforaminal stenosis of cervical spine    Cervical stenosis of spinal canal    Diverticulosis of large intestine    Supraventricular tachycardia     Past Surgical History:   Procedure Laterality Date    ARTHROSCOPY KNEE          COLONOSCOPY      ,follow up due  -- s/p polyp removal --> next in     COLONOSCOPY  09/10/2015    9/10/2015[place]18467.0,[place] REMOVE JULIO POLYP LESN SNARE    COLONOSCOPY  2018    splenic flexure polyp    COLONOSCOPY N/A 2018    1 small tubular adenoma follow up     SHOULDER SURGERY Right 2016    SOFT TISSUE SURGERY         Social History     Tobacco Use    Smoking status: Former     Packs/day: 0.50     Years: 10.00     Additional pack years: 0.00     Total pack years: 5.00     Types: Cigarettes     Quit date: 7/10/1985     Years since quittin.7     Passive exposure: Past    Smokeless tobacco: Former   Substance Use Topics     Alcohol use: Not Currently     Alcohol/week: 11.7 standard drinks of alcohol     Family History   Problem Relation Age of Onset    Osteoporosis Mother         Osteoporosis    Genetic Disorder Other         Genetic,Mother - Diabetes.  No FHx of CVA, No early family HX of CAD, no known cancers         Current Outpatient Medications   Medication Sig Dispense Refill    betamethasone dipropionate (DIPROSONE) 0.05 % external ointment APPLY TOPICALLY TO THE AFFECTED AREA TWICE DAILY. MAX USE 14 DAYS CONSECUTIVELY 45 g 1    pravastatin (PRAVACHOL) 40 MG tablet Take 1 tablet (40 mg) by mouth daily 90 tablet 3    sertraline (ZOLOFT) 50 MG tablet Take 1 tablet (50 mg) by mouth daily 90 tablet 3    metoprolol tartrate (LOPRESSOR) 25 MG tablet Take 1 tablet (25 mg) by mouth 2 times daily as needed (palpitations) (Patient not taking: Reported on 8/2/2022) 60 tablet 3     No Known Allergies  Current providers sharing in care for this patient include:  Patient Care Team:  Jarrett Ibarra PA-C as PCP - General (Family Medicine)  Jarrett Ibarra PA-C as Assigned PCP    The following health maintenance items are reviewed in Epic and correct as of today:  Health Maintenance   Topic Date Due    RSV VACCINE (Pregnancy & 60+) (1 - 1-dose 60+ series) Never done    COVID-19 Vaccine (5 - 2023-24 season) 09/01/2023    MEDICARE ANNUAL WELLNESS VISIT  02/14/2024    LIPID  02/14/2024    ANNUAL REVIEW OF HM ORDERS  02/14/2024    DTAP/TDAP/TD IMMUNIZATION (2 - Td or Tdap) 09/22/2024    FALL RISK ASSESSMENT  03/20/2025    GLUCOSE  02/14/2026    ADVANCE CARE PLANNING  02/14/2028    COLORECTAL CANCER SCREENING  09/17/2028    PHQ-2 (once per calendar year)  Completed    Pneumococcal Vaccine: 65+ Years  Completed    ZOSTER IMMUNIZATION  Completed    HEPATITIS C SCREENING  Addressed    IPV IMMUNIZATION  Aged Out    HPV IMMUNIZATION  Aged Out    MENINGITIS IMMUNIZATION  Aged Out    RSV MONOCLONAL ANTIBODY  Aged Out    INFLUENZA VACCINE  Discontinued  "        Review of Systems  Constitutional, HEENT, cardiovascular, pulmonary, GI, , musculoskeletal, neuro, skin, endocrine and psych systems are negative, except as otherwise noted.     Objective    Exam  /70   Pulse 60   Temp 97.6  F (36.4  C) (Temporal)   Resp 16   Ht 1.828 m (5' 11.97\")   Wt 87.1 kg (192 lb)   SpO2 97%   BMI 26.06 kg/m     Estimated body mass index is 26.06 kg/m  as calculated from the following:    Height as of this encounter: 1.828 m (5' 11.97\").    Weight as of this encounter: 87.1 kg (192 lb).    Physical Exam  GENERAL: alert and no distress  EYES: Eyes grossly normal to inspection, PERRL and conjunctivae and sclerae normal  HENT: ear canals and TM's normal, nose and mouth without ulcers or lesions  NECK: no adenopathy, no asymmetry, masses, or scars  RESP: lungs clear to auscultation - no rales, rhonchi or wheezes  CV: regular rate and rhythm, normal S1 S2, no S3 or S4, no murmur, click or rub, no peripheral edema  ABDOMEN: soft, nontender, no hepatosplenomegaly, no masses and bowel sounds normal  MS: no gross musculoskeletal defects noted, no edema  SKIN: right upper back there is a 5 mm raised, rough, slightly erythematous lesion - removed with shave biopsy.   NEURO: Normal strength and tone, mentation intact and speech normal  PSYCH: mentation appears normal, affect normal/bright       Procedure:  Discussed the risks and benefits with patient.  Patient signed consent.  The area was cleansed with Chlorhexidine  The area was anesthestized using 0.5 cc's of 1 % lidocaine with epinephrine.  The lesion was grasped with forceps and removed using Dermablade and shave biopsy technique. The lesion was placed in Formalin at 10:30 AM.  Drysol applied with good hemostasis.  Bacitracin ointment and bandage applied.  Patient tolerated the procedure well.     Wound Care:  Monitor for skin infection - redness, swelling, drainage, pain, fever.  Keep covered for next 24 hours and then ok to " clean.  Keep clean and dry.  No scrubbing over the area required. No applying topical steroids to this area.         3/20/2024   Mini Cog   Clock Draw Score 0 Abnormal   3 Item Recall 3 objects recalled   Mini Cog Total Score 3              Signed Electronically by: Jarrett Ibarra PA-C

## 2024-03-20 NOTE — PATIENT INSTRUCTIONS
Monitor for infection -redness, swelling, drainage, pain, fevers, chills, etc.     Keep area clean and dry.   Ok to shower, do not scrub the area aggressively.   Bacitracin ointment/vaseline over the area as needed.   Ok to use bandage over the area.   Sunscreen protection regularly when exposed to sun.   Can use over the counter scar limiting products  after 4 weeks of healing- Mederma, vitamin E and aloe products.     Do not use the Betamethasone on this area until it is fully healed.       Preventive Care Advice   This is general advice given by our system to help you stay healthy. However, your care team may have specific advice just for you. Please talk to your care team about your preventive care needs.  Nutrition  Eat 5 or more servings of fruits and vegetables each day.  Try wheat bread, brown rice and whole grain pasta (instead of white bread, rice, and pasta).  Get enough calcium and vitamin D. Check the label on foods and aim for 100% of the RDA (recommended daily allowance).  Lifestyle  Exercise at least 150 minutes each week   (30 minutes a day, 5 days a week).  Do muscle strengthening activities 2 days a week. These help control your weight and prevent disease.  No smoking.  Wear sunscreen to prevent skin cancer.  Have a dental exam and cleaning every 6 months.  Yearly exams  See your health care team every year to talk about:  Any changes in your health.  Any medicines your care team has prescribed.  Preventive care, family planning, and ways to prevent chronic diseases.  Shots (vaccines)   HPV shots (up to age 26), if you've never had them before.  Hepatitis B shots (up to age 59), if you've never had them before.  COVID-19 shot: Get this shot when it's due.  Flu shot: Get a flu shot every year.  Tetanus shot: Get a tetanus shot every 10 years.  Pneumococcal, hepatitis A, and RSV shots: Ask your care team if you need these based on your risk.  Shingles shot (for age 50 and up).  General health  tests  Diabetes screening:  Starting at age 35, Get screened for diabetes at least every 3 years.  If you are younger than age 35, ask your care team if you should be screened for diabetes.  Cholesterol test: At age 39, start having a cholesterol test every 5 years, or more often if advised.  Bone density scan (DEXA): At age 50, ask your care team if you should have this scan for osteoporosis (brittle bones).  Hepatitis C: Get tested at least once in your life.  STIs (sexually transmitted infections)  Before age 24: Ask your care team if you should be screened for STIs.  After age 24: Get screened for STIs if you're at risk. You are at risk for STIs (including HIV) if:  You are sexually active with more than one person.  You don't use condoms every time.  You or a partner was diagnosed with a sexually transmitted infection.  If you are at risk for HIV, ask about PrEP medicine to prevent HIV.  Get tested for HIV at least once in your life, whether you are at risk for HIV or not.  Cancer screening tests  Cervical cancer screening: If you have a cervix, begin getting regular cervical cancer screening tests at age 21. Most people who have regular screenings with normal results can stop after age 65. Talk about this with your provider.  Breast cancer scan (mammogram): If you've ever had breasts, begin having regular mammograms starting at age 40. This is a scan to check for breast cancer.  Colon cancer screening: It is important to start screening for colon cancer at age 45.  Have a colonoscopy test every 10 years (or more often if you're at risk) Or, ask your provider about stool tests like a FIT test every year or Cologuard test every 3 years.  To learn more about your testing options, visit: https://www.Anc[employer]/484591.pdf.  For help making a decision, visit: https://bit.ly/cx44130.  Prostate cancer screening test: If you have a prostate and are age 55 to 69, ask your provider if you would benefit from a yearly  prostate cancer screening test.  Lung cancer screening: If you are a current or former smoker age 50 to 80, ask your care team if ongoing lung cancer screenings are right for you.  For informational purposes only. Not to replace the advice of your health care provider. Copyright   2023 [employer]. All rights reserved. Clinically reviewed by the [facility] Transitions Program. [employer] [id number] - REV 01/24.

## 2024-03-21 ENCOUNTER — TELEPHONE (OUTPATIENT)
Dept: FAMILY MEDICINE | Facility: OTHER | Age: 79
End: 2024-03-21
Payer: COMMERCIAL

## 2024-03-21 NOTE — TELEPHONE ENCOUNTER
Lab calling asking where the skin sample is from     On what part of body       Please call lab back with reply     [phone]    Candice Cabello RN

## 2024-03-22 LAB
PATH REPORT.COMMENTS IMP SPEC: NORMAL
PATH REPORT.COMMENTS IMP SPEC: NORMAL
PATH REPORT.FINAL DX SPEC: NORMAL
PATH REPORT.GROSS SPEC: NORMAL
PATH REPORT.MICROSCOPIC SPEC OTHER STN: NORMAL
PATH REPORT.RELEVANT HX SPEC: NORMAL

## 2024-08-14 NOTE — TELEPHONE ENCOUNTER
"                            Occupational Therapy Daily Treatment Note   Date 8/14/2024    Date: 6/24/2024  Name: Miesha Domínguez  [facility] Number: [id number]    Therapy Diagnosis:  Encounter Diagnoses   Name Primary?    Decreased range of motion of finger of right hand Yes    Pain            Physician: Claire Richardson MD    Physician Orders: Evaluate and treat  Medical Diagnosis: CTR and Trigger finger release right long finger  Surgical Procedure and Date: 6/04/2024, CTR and TF    Evaluation Date: 6/24/2024; 8/30/24  Insurance Authorization Period Expiration: 12/31/24   Plan of Care Expiration: 8 weeks; 9/23/2024  Date of Return to MD: 9/14/2024       Visit # / Visits authorized: 16 / 20  FOTO: (7/09/2024 and 77%)    FOTO Lobby Code: [id number]    Precautions: Standard and MS     Time in: 10:00 AM   Time out: 10:55 AM   Total Billed time: 55 minutes    Subjective     Pt reports: "it gets straight but I feel like it keeps wanting to bend" I see dr. Richardson today   she was compliant with home exercise program given last session.   Response to previous treatment: improved Rom   Functional change: reports that she can move her fingers well     Pain: 0 / 10  Location: right finger    Objective       Wrist ROM:   8/12/2024  Ext/flex: 75/52  RD/UD: 15/35    Hand ROM. Measured in degrees.    6/18/2024 6/18/2024 7/18/2024 8/12/2024 8/14/2024     Left Right Right  Right Right              Index: MP    46 55                PIP       76 95                DIP   37 55                RANGEL   159                 Long:  MP   50 65 75               PIP   -30/80 -20/92 -9/95 -6/              DIP   36 60 62               RANGEL   136                 Ring:   MP   45 65                PIP   75 95                DIP   31 59                RANGEL   151                 Small:  MP   44 76                 PIP   80 95                 DIP   34 50                RANGEL   158                 Thumb: MP 27 29 40                  IP 65 74 75         Rad ADD/ABD   26 " Seeing ES now  Emmanuelle Sylvester MD     41         Pal ADD/ABD   37 46            Opposition MP  PIP PIP            Strength (Dynamometer) and Pinch Strength (Pinch Gauge)  Measured in pounds.    7/18/2024 7/18/2024 8/12/2024     Left Right Right   Rung II  45/40/41  25 38.5   Jefferson Pinch  14.5  14 14   3pt Pinch  12.5  9 12   2pt Pinch  11  9.5  10.5       FOTO: self care    initial eval   07/09/2024     Limitation  Score   85%  77%       Objective     Miesha received the following:    supervised modalities after being cleared for contradictions, received the following for 10 minutes:   - paraffin wax with MHP to right hand to promote tissue extensibility     (NT)  - fluidotherapy to R hand, continuous air, 115 deg, air speed 50 to decrease pain, edema & scar tissue, sensory re- education, and increased tissue extensibility prior to therex     Manual therapy techniques for 25 minutes:   - scar massage with tissue movers   - CTS/TF myofascial release  - dycem myofascial mobilization with movement (jt blocking) to encourage   - vibration tool for desensitization over proximal scar NT    Therapeutic exercises for 25 minutes including:   - ORL of LF stretch 10 sec holds x 10  - reverse joint blocking for PIP extension x 15 reps   - prayer stretch 30 sec hold x 3 NT  - hook grasp with unweighted dowel x 10 (2 sets)  - digit abduction/adduction (1 min) with  red RB   - thumb radial/palmar abduction x 15 with yellow rubber band NT   - hook pull/push green power web holding stretch for 5 counts x 20 reps   - isopsheres on towel (2 min) NT  - 3pt and later pinch using red clothespin and med poms x 1 set each Nt   - in-hand manipulation with small poms (1 set) NT  - twisting green flexbar (x 30 reps) NT        (NT)  - wrist maze (3 min)   - wrist AROM 3-ways x 10 each (3 sets)   - LF extension kicks with medium poms x 10     Home Exercises and Education Provided     Education provided:    - cont HEP with green theraputty (upgraded)  - Progress towards goals      Written Home Exercises Provided: Patient instructed to cont prior HEP.  Exercises were reviewed and Miesha was able to demonstrate them prior to the end of the session. Miesha demonstrated good  understanding of the education provided.      See EMR under Patient Instructions for exercises provided prior visit.     Assessment       Able to gain PIP extension following heat, manual, and stretch. F/u with Dr. Richardson today     Miesha is progressing well towards her goals and there are no updates to goals at this time. Pt prognosis is Good.     Pt will continue to benefit from skilled outpatient occupational therapy to address the deficits listed in the problem list on initial evaluation provide pt/family education and to maximize pt's level of independence in the home and community environment.     Anticipated barriers to occupational therapy: none    Pt's spiritual, cultural and educational needs considered and pt agreeable to plan of care and goals.    Goals:   The following goals were discussed with the patient and patient is in agreement with them as to be addressed in the treatment plan.   Long Term Goals (LTGs); to be met by discharge.  Patient will achieve 95% on the FOTO, within 8 weeks.  Assess pinch and  when appropriate.     Short Term Goals (STGs); to be met within 4 weeks.  Patient will demonstrate improved ROM in R hand, to improve functional use, within 4 weeks.  Patient will report no more than 1 out of 10 pain on the Numeric Pain Rating Scale, to decrease patient discomfort, within 4 weeks.   Patient will achieve 90% on the FOTO showing more improvement.     PLAN   Plan of Care Certification: 6/18/2024 to 8/30/24 .      Discussed Plan of Care with patient: Yes  Updates/Grading for next session: ROM     Marisol Funez, SONORMAN  08/08/2024    Co-sign: Giovanna Pacheco OT 08/08/2024

## 2025-01-09 ENCOUNTER — OFFICE VISIT (OUTPATIENT)
Dept: FAMILY MEDICINE | Facility: OTHER | Age: 80
End: 2025-01-09
Payer: COMMERCIAL

## 2025-01-09 VITALS
RESPIRATION RATE: 20 BRPM | OXYGEN SATURATION: 96 % | BODY MASS INDEX: 24.92 KG/M2 | HEART RATE: 65 BPM | TEMPERATURE: 97 F | SYSTOLIC BLOOD PRESSURE: 134 MMHG | HEIGHT: 72 IN | DIASTOLIC BLOOD PRESSURE: 74 MMHG | WEIGHT: 184 LBS

## 2025-01-09 DIAGNOSIS — J18.9 PNEUMONIA OF RIGHT LOWER LOBE DUE TO INFECTIOUS ORGANISM: Primary | ICD-10-CM

## 2025-01-09 RX ORDER — DOXYCYCLINE 100 MG/1
100 CAPSULE ORAL 2 TIMES DAILY
Qty: 20 CAPSULE | Refills: 0 | Status: SHIPPED | OUTPATIENT
Start: 2025-01-09

## 2025-01-09 ASSESSMENT — PAIN SCALES - GENERAL: PAINLEVEL_OUTOF10: NO PAIN (0)

## 2025-01-09 ASSESSMENT — PATIENT HEALTH QUESTIONNAIRE - PHQ9
SUM OF ALL RESPONSES TO PHQ QUESTIONS 1-9: 6
SUM OF ALL RESPONSES TO PHQ QUESTIONS 1-9: 6
10. IF YOU CHECKED OFF ANY PROBLEMS, HOW DIFFICULT HAVE THESE PROBLEMS MADE IT FOR YOU TO DO YOUR WORK, TAKE CARE OF THINGS AT HOME, OR GET ALONG WITH OTHER PEOPLE: NOT DIFFICULT AT ALL

## 2025-01-09 NOTE — PROGRESS NOTES
Assessment & Plan       ICD-10-CM    1. Pneumonia of right lower lobe due to infectious organism  J18.9 doxycycline hyclate (VIBRAMYCIN) 100 MG capsule          1. Symptoms are concerning for pneumonia given his symptoms and right lower lobe rhonchi and rales. X-ray not warranted as would not change treatment. Vitals are normal. Will treat with doxycycline for 10 days and I recommend a probiotic while on this. Encouraged Mucinex to help with the chest congestion and continue with plenty of fluids and rest. Follow-up if worsening or not improving. Red flag symptoms discussed including rapidly worsening shortness of breath or high fevers. If this occurs, he should be seen emergently.        Franny Soto is a 79 year old, presenting for the following health issues:  Nasal Congestion        1/9/2025     2:40 PM   Additional Questions   Roomed by Marita NEAL     History of Present Illness       Reason for visit:  Congestion  Symptom onset:  3-7 days ago  Symptom intensity:  Severe  Symptom progression:  Improving  Had these symptoms before:  No   He is taking medications regularly.       Acute Illness  Acute illness concerns: URI  Onset/Duration: 1 week   Symptoms:  Fever: No  Chills/Sweats: YES- nighttime  Headache (location?): YES- behind the eyes   Sinus Pressure: No  Conjunctivitis:  No  Ear Pain: no  Rhinorrhea: YES  Congestion: YES  Sore Throat: No  Cough: YES-non-productive  Wheeze: No  Decreased Appetite: YES  Nausea: YES  Vomiting: No  Diarrhea: YES  Dysuria/Freq.: No  Dysuria or Hematuria: No  Fatigue/Achiness: YES  Sick/Strep Exposure: YES- Wife has same symptoms but got better   Therapies tried and outcome: OTC Robitussin, ibuprofen, rest, drink fluids.     He states this is different than typical illness in the past. He has had some night sweats and loss of weight due to poor appetite. His cough is mostly dry but sometimes he coughs up mucous. Intermittent shortness of breath but no fever. He feels  "slightly better today compared to the past few days.     Review of Systems  Constitutional, HEENT, cardiovascular, pulmonary, gi and gu systems are negative, except as otherwise noted.      Objective    /74   Pulse 65   Temp 97  F (36.1  C) (Temporal)   Resp 20   Ht 1.828 m (5' 11.97\")   Wt 83.5 kg (184 lb)   SpO2 96%   BMI 24.98 kg/m    Body mass index is 24.98 kg/m .  Physical Exam   GENERAL: alert and no distress  EYES: Eyes grossly normal to inspection, PERRL and conjunctivae and sclerae normal  HENT: ear canals and TM's normal, nose and mouth without ulcers or lesions  NECK: no adenopathy, no asymmetry, masses, or scars  RESP: lungs with right lower lobe rhonchi and rales, mild wheezing of right upper lung. Left lung is clear.  CV: regular rate and rhythm, normal S1 S2, no S3 or S4, no murmur, click or rub, no peripheral edema          Signed Electronically by: Michael Aguilar PA-C    "

## 2025-01-09 NOTE — PATIENT INSTRUCTIONS
Your exam is consistent with pneumonia.  Will treat with 10 days of doxycycline.  Try Mucinex to help get up some of the mucous.  Stay well hydrated try to rest.  Follow-up if not improving.

## 2025-01-29 NOTE — PATIENT INSTRUCTIONS
Rash appears to be an allergic reaction and not contagious or infectious    Try Zyrtec 10 mg twice daily     Cool or cold wash cloth    Baking soda paste for 5 minutes then wash off    Use anti-itch cream     Follow up if worsening or concerns    hide

## 2025-02-18 ENCOUNTER — PATIENT OUTREACH (OUTPATIENT)
Dept: CARE COORDINATION | Facility: CLINIC | Age: 80
End: 2025-02-18
Payer: COMMERCIAL

## 2025-03-04 ENCOUNTER — PATIENT OUTREACH (OUTPATIENT)
Dept: CARE COORDINATION | Facility: CLINIC | Age: 80
End: 2025-03-04
Payer: COMMERCIAL

## 2025-04-22 DIAGNOSIS — E78.2 MIXED HYPERLIPIDEMIA: ICD-10-CM

## 2025-04-22 DIAGNOSIS — F41.9 ANXIETY: ICD-10-CM

## 2025-04-22 RX ORDER — PRAVASTATIN SODIUM 40 MG
40 TABLET ORAL DAILY
Qty: 90 TABLET | Refills: 3 | OUTPATIENT
Start: 2025-04-22

## 2025-04-22 RX ORDER — PRAVASTATIN SODIUM 40 MG
40 TABLET ORAL DAILY
Qty: 90 TABLET | Refills: 3 | Status: SHIPPED | OUTPATIENT
Start: 2025-04-22

## 2025-04-22 NOTE — TELEPHONE ENCOUNTER
Patient is overdue to fill pravastatin. Per our records, last filled 1/23/25 for 90 day supply and is 9 days late to fill. Current prescription out of refills.     New prescription needed given insufficient refills remaining so pended for prescriber review and signature. Upcoming appointment 4/28/25.       Thank you!    Jessica Bruner, PharmD, [facility]  Population Health Pharmacist  [phone]

## 2025-04-25 SDOH — HEALTH STABILITY: PHYSICAL HEALTH: ON AVERAGE, HOW MANY DAYS PER WEEK DO YOU ENGAGE IN MODERATE TO STRENUOUS EXERCISE (LIKE A BRISK WALK)?: 6 DAYS

## 2025-04-25 SDOH — HEALTH STABILITY: PHYSICAL HEALTH: ON AVERAGE, HOW MANY MINUTES DO YOU ENGAGE IN EXERCISE AT THIS LEVEL?: 90 MIN

## 2025-04-25 ASSESSMENT — SOCIAL DETERMINANTS OF HEALTH (SDOH): HOW OFTEN DO YOU GET TOGETHER WITH FRIENDS OR RELATIVES?: ONCE A WEEK

## 2025-04-28 ENCOUNTER — OFFICE VISIT (OUTPATIENT)
Dept: FAMILY MEDICINE | Facility: OTHER | Age: 80
End: 2025-04-28
Payer: COMMERCIAL

## 2025-04-28 VITALS
HEART RATE: 56 BPM | WEIGHT: 192 LBS | RESPIRATION RATE: 16 BRPM | BODY MASS INDEX: 26.88 KG/M2 | SYSTOLIC BLOOD PRESSURE: 124 MMHG | TEMPERATURE: 98.4 F | OXYGEN SATURATION: 99 % | HEIGHT: 71 IN | DIASTOLIC BLOOD PRESSURE: 64 MMHG

## 2025-04-28 DIAGNOSIS — I47.10 SVT (SUPRAVENTRICULAR TACHYCARDIA): ICD-10-CM

## 2025-04-28 DIAGNOSIS — I47.10 SUPRAVENTRICULAR TACHYCARDIA: ICD-10-CM

## 2025-04-28 DIAGNOSIS — Z00.00 ENCOUNTER FOR MEDICARE ANNUAL WELLNESS EXAM: Primary | ICD-10-CM

## 2025-04-28 DIAGNOSIS — E78.2 MIXED HYPERLIPIDEMIA: ICD-10-CM

## 2025-04-28 DIAGNOSIS — K21.9 GASTROESOPHAGEAL REFLUX DISEASE WITHOUT ESOPHAGITIS: ICD-10-CM

## 2025-04-28 DIAGNOSIS — R73.03 PREDIABETES: ICD-10-CM

## 2025-04-28 DIAGNOSIS — F41.9 ANXIETY: ICD-10-CM

## 2025-04-28 DIAGNOSIS — Z12.5 SCREENING FOR PROSTATE CANCER: ICD-10-CM

## 2025-04-28 LAB
ALBUMIN SERPL BCG-MCNC: 4.1 G/DL (ref 3.5–5.2)
ALP SERPL-CCNC: 56 U/L (ref 40–150)
ALT SERPL W P-5'-P-CCNC: 21 U/L (ref 0–70)
ANION GAP SERPL CALCULATED.3IONS-SCNC: 11 MMOL/L (ref 7–15)
AST SERPL W P-5'-P-CCNC: 29 U/L (ref 0–45)
BILIRUB SERPL-MCNC: 0.6 MG/DL
BUN SERPL-MCNC: 16.3 MG/DL (ref 8–23)
CALCIUM SERPL-MCNC: 8.9 MG/DL (ref 8.8–10.4)
CHLORIDE SERPL-SCNC: 105 MMOL/L (ref 98–107)
CHOLEST SERPL-MCNC: 155 MG/DL
CREAT SERPL-MCNC: 0.84 MG/DL (ref 0.67–1.17)
EGFRCR SERPLBLD CKD-EPI 2021: 89 ML/MIN/1.73M2
EST. AVERAGE GLUCOSE BLD GHB EST-MCNC: 117 MG/DL
FASTING STATUS PATIENT QL REPORTED: NO
FASTING STATUS PATIENT QL REPORTED: NO
GLUCOSE SERPL-MCNC: 95 MG/DL (ref 70–99)
HBA1C MFR BLD: 5.7 % (ref 0–5.6)
HCO3 SERPL-SCNC: 23 MMOL/L (ref 22–29)
HDLC SERPL-MCNC: 53 MG/DL
LDLC SERPL CALC-MCNC: 83 MG/DL
NONHDLC SERPL-MCNC: 102 MG/DL
POTASSIUM SERPL-SCNC: 3.8 MMOL/L (ref 3.4–5.3)
PROT SERPL-MCNC: 7.2 G/DL (ref 6.4–8.3)
PSA SERPL DL<=0.01 NG/ML-MCNC: 1.19 NG/ML (ref 0–6.5)
SODIUM SERPL-SCNC: 139 MMOL/L (ref 135–145)
TRIGL SERPL-MCNC: 95 MG/DL

## 2025-04-28 PROCEDURE — G2211 COMPLEX E/M VISIT ADD ON: HCPCS | Performed by: PHYSICIAN ASSISTANT

## 2025-04-28 PROCEDURE — 80061 LIPID PANEL: CPT | Performed by: PHYSICIAN ASSISTANT

## 2025-04-28 PROCEDURE — 3074F SYST BP LT 130 MM HG: CPT | Performed by: PHYSICIAN ASSISTANT

## 2025-04-28 PROCEDURE — 36415 COLL VENOUS BLD VENIPUNCTURE: CPT | Performed by: PHYSICIAN ASSISTANT

## 2025-04-28 PROCEDURE — 80053 COMPREHEN METABOLIC PANEL: CPT | Performed by: PHYSICIAN ASSISTANT

## 2025-04-28 PROCEDURE — 83036 HEMOGLOBIN GLYCOSYLATED A1C: CPT | Performed by: PHYSICIAN ASSISTANT

## 2025-04-28 PROCEDURE — G0103 PSA SCREENING: HCPCS | Performed by: PHYSICIAN ASSISTANT

## 2025-04-28 PROCEDURE — 99214 OFFICE O/P EST MOD 30 MIN: CPT | Mod: 25 | Performed by: PHYSICIAN ASSISTANT

## 2025-04-28 PROCEDURE — 3078F DIAST BP <80 MM HG: CPT | Performed by: PHYSICIAN ASSISTANT

## 2025-04-28 PROCEDURE — G0439 PPPS, SUBSEQ VISIT: HCPCS | Performed by: PHYSICIAN ASSISTANT

## 2025-04-28 PROCEDURE — 1125F AMNT PAIN NOTED PAIN PRSNT: CPT | Performed by: PHYSICIAN ASSISTANT

## 2025-04-28 RX ORDER — OMEPRAZOLE 20 MG/1
20 CAPSULE, DELAYED RELEASE ORAL DAILY
Qty: 90 CAPSULE | Refills: 3 | Status: SHIPPED | OUTPATIENT
Start: 2025-04-28

## 2025-04-28 RX ORDER — PRAVASTATIN SODIUM 40 MG
40 TABLET ORAL DAILY
Qty: 90 TABLET | Refills: 3 | Status: SHIPPED | OUTPATIENT
Start: 2025-04-28

## 2025-04-28 RX ORDER — METOPROLOL TARTRATE 25 MG/1
25 TABLET, FILM COATED ORAL 2 TIMES DAILY PRN
Qty: 14 TABLET | Refills: 3 | Status: SHIPPED | OUTPATIENT
Start: 2025-04-28

## 2025-04-28 ASSESSMENT — ANXIETY QUESTIONNAIRES
7. FEELING AFRAID AS IF SOMETHING AWFUL MIGHT HAPPEN: NOT AT ALL
6. BECOMING EASILY ANNOYED OR IRRITABLE: NOT AT ALL
3. WORRYING TOO MUCH ABOUT DIFFERENT THINGS: NOT AT ALL
1. FEELING NERVOUS, ANXIOUS, OR ON EDGE: NOT AT ALL
GAD7 TOTAL SCORE: 0
2. NOT BEING ABLE TO STOP OR CONTROL WORRYING: NOT AT ALL
5. BEING SO RESTLESS THAT IT IS HARD TO SIT STILL: NOT AT ALL
GAD7 TOTAL SCORE: 0

## 2025-04-28 ASSESSMENT — PATIENT HEALTH QUESTIONNAIRE - PHQ9
5. POOR APPETITE OR OVEREATING: NOT AT ALL
SUM OF ALL RESPONSES TO PHQ QUESTIONS 1-9: 0

## 2025-04-28 ASSESSMENT — PAIN SCALES - GENERAL: PAINLEVEL_OUTOF10: MODERATE PAIN (5)

## 2025-04-28 NOTE — PROGRESS NOTES
Preventive Care Visit  [facility]  Jarrett Ibarra PA-C, Family Medicine  Apr 28, 2025      Assessment & Plan     Encounter for Medicare annual wellness exam  Need for vaccination  - Discussed vaccines. Due for Tdap and RSV to get at pharmacy. Not interested in covid vaccine.  - Last colonoscopy in 2018. Found tubular adenoma and was told could repeat colonoscopy in 10 years. Typical screening with tubular adenoma is ever 5-7 years. Patient aware of this and will consider getting another colonoscopy in the future.    - Rash he had in the fall has resolved, did see Dermatology and they had no concerns at the time but rash was gone, they recommended he come back if it recurs. They did a skin exam and found no concerning moles per patient report.     Prediabetes  Last A1c 5.8 (3/20/2024).  A1c is pretty stable, slightly down at 5.7%.    Continue with the lifestyle changes he has made.  No new concerns at this time.   - Comprehensive metabolic panel (BMP + Alb, Alk Phos, ALT, AST, Total. Bili, TP); Future  - Hemoglobin A1c; Future  - Comprehensive metabolic panel (BMP + Alb, Alk Phos, ALT, AST, Total. Bili, TP)  - Hemoglobin A1c    Mixed hyperlipidemia  Last  (3/20/2024). Cholesterol looks good.  No adjustments to medication.   - Lipid panel reflex to direct LDL Non-fasting; Future  - Comprehensive metabolic panel (BMP + Alb, Alk Phos, ALT, AST, Total. Bili, TP); Future  - pravastatin (PRAVACHOL) 40 MG tablet; Take 1 tablet (40 mg) by mouth daily.  - Lipid panel reflex to direct LDL Non-fasting  - Comprehensive metabolic panel (BMP + Alb, Alk Phos, ALT, AST, Total. Bili, TP)    Anxiety  Stable. No acute concerns   - sertraline (ZOLOFT) 50 MG tablet; Take 1 tablet (50 mg) by mouth daily.    Screening for prostate cancer  - PSA, screen; Future  - PSA, screen    SVT (supraventricular tachycardia)  Supraventricular tachycardia  Stable. Has not needed to take metoprolol in the last year.   -  metoprolol tartrate (LOPRESSOR) 25 MG tablet; Take 1 tablet (25 mg) by mouth 2 times daily as needed (palpitations).    Gastroesophageal reflux disease without esophagitis  Stable   - omeprazole (PRILOSEC) 20 MG DR capsule; Take 1 capsule (20 mg) by mouth daily.            Counseling  Appropriate preventive services were addressed with this patient via screening, questionnaire, or discussion as appropriate for fall prevention, nutrition, physical activity, Tobacco-use cessation, social engagement, weight loss and cognition.  Checklist reviewing preventive services available has been given to the patient.  Reviewed patient's diet, addressing concerns and/or questions.   The patient was instructed to see the dentist every 6 months.       The longitudinal plan of care for the diagnosis(es)/condition(s) as documented were addressed during this visit. Due to the added complexity in care, I will continue to support Charles in the subsequent management and with ongoing continuity of care.      Subjective   Charles is a 79 year old, presenting for the following:  Wellness Visit        4/28/2025     1:22 PM   Additional Questions   Roomed by NUBIA Lyon   Accompanied by Self         HPI  Patient presenting to clinic for annual wellness exam. RSV, Tdap, and covid vaccines are due. Encouraged to get RSV and Tdap at pharmacy. Patient not interested in covid vaccine. Last colonoscopy in 2018 found tubular adenoma but told next colonoscopy at 10 years. Patient denies any change in stool patterns or blood in stool. Patient reports Zoloft is working and does not have any mental health concerns.     Patient focuses on protein, fiber, and eat fruits/vegetables. Patient reports he walks everyday and is getting activity while remodeling a house currently. He sleeps 8hrs/night with no difficulty falling/staying asleep. Patient does not smoke tobacco, drink alcohol, or use any other chemicals.            Advance Care Planning    Document on file  is a Health Care Directive or POLST.        4/25/2025   General Health   How would you rate your overall physical health? Good   Feel stress (tense, anxious, or unable to sleep) Only a little   (!) STRESS CONCERN      4/25/2025   Nutrition   Diet: Regular (no restrictions)         4/25/2025   Exercise   Days per week of moderate/strenous exercise 6 days   Average minutes spent exercising at this level 90 min         4/25/2025   Social Factors   Frequency of gathering with friends or relatives Once a week   Worry food won't last until get money to buy more No   Food not last or not have enough money for food? No   Do you have housing? (Housing is defined as stable permanent housing and does not include staying outside in a car, in a tent, in an abandoned building, in an overnight shelter, or couch-surfing.) Yes   Are you worried about losing your housing? No   Lack of transportation? No   Unable to get utilities (heat,electricity)? No         4/25/2025   Fall Risk   Fallen 2 or more times in the past year? No   Trouble with walking or balance? No          4/25/2025   Activities of Daily Living- Home Safety   Needs help with the following daily activites None of the above   Safety concerns in the home None of the above         4/25/2025   Dental   Dentist two times every year? (!) NO         4/25/2025   Hearing Screening   Hearing concerns? None of the above         4/25/2025   Driving Risk Screening   Patient/family members have concerns about driving No         4/25/2025   General Alertness/Fatigue Screening   Have you been more tired than usual lately? No         4/25/2025   Urinary Incontinence Screening   Bothered by leaking urine in past 6 months No         Today's PHQ-2 Score:       4/28/2025     1:22 PM   PHQ-2 ( 1999 [employer])   Q1: Little interest or pleasure in doing things 0    Q2: Feeling down, depressed or hopeless 0    PHQ-2 Score 0    Q1: Little interest or pleasure in doing things Not at all   Q2:  Feeling down, depressed or hopeless Not at all   PHQ-2 Score 0       Proxy-reported           2025   Substance Use   Alcohol more than 3/day or more than 7/wk Not Applicable   Do you have a current opioid prescription? No   How severe/bad is pain from 1 to 10? 5/10   Do you use any other substances recreationally? No     Social History     Tobacco Use    Smoking status: Former     Current packs/day: 0.00     Average packs/day: 0.5 packs/day for 10.0 years (5.0 ttl pk-yrs)     Types: Cigarettes     Start date: 7/10/1975     Quit date: 7/10/1985     Years since quittin.8     Passive exposure: Past    Smokeless tobacco: Former   Vaping Use    Vaping status: Never Used   Substance Use Topics    Alcohol use: Not Currently     Alcohol/week: 11.7 standard drinks of alcohol    Drug use: Never       ASCVD Risk   The 10-year ASCVD risk score ([place] GUPTA, et al., 2019) is: 34.7%    Values used to calculate the score:      Age: 79 years      Sex: Male      Is Non- : No      Diabetic: No      Tobacco smoker: No      Systolic Blood Pressure: 140 mmHg      Is BP treated: No      HDL Cholesterol: 51 mg/dL      Total Cholesterol: 186 mg/dL            Reviewed and updated as needed this visit by Provider                    Past Medical History:   Diagnosis Date    Anxiety disorder     lifetime    Arthritis neck and knee    History of adenomatous polyp of colon     tubular adenoma x1, next scope      Past Surgical History:   Procedure Laterality Date    ARTHROSCOPY KNEE          COLONOSCOPY      ,follow up due  -- s/p polyp removal --> next in     COLONOSCOPY  09/10/2015    9/10/2015,85742.0,[place] COLONOSCOPY REMOVE JULIO POLYP LESN SNARE    COLONOSCOPY  2018    splenic flexure polyp    COLONOSCOPY N/A 2018    1 small tubular adenoma follow up     SHOULDER SURGERY Right 2016    SOFT TISSUE SURGERY       BP Readings from Last 3 Encounters:   25 124/64    25 134/74   24 132/74    Wt Readings from Last 3 Encounters:   25 87.1 kg (192 lb)   25 83.5 kg (184 lb)   24 86.6 kg (191 lb)                  Patient Active Problem List   Diagnosis    Arthritis of right acromioclavicular joint    Complete tear of right rotator cuff    Health care maintenance    History of colon polyps, tubular adenoma x1, next scope     History of prediabetes    History of tobacco use    Mixed hyperlipidemia    Chronic neck pain    Sigmoid diverticulosis    Neuroforaminal stenosis of cervical spine    Cervical stenosis of spinal canal    Diverticulosis of large intestine    Supraventricular tachycardia    Prediabetes     Past Surgical History:   Procedure Laterality Date    ARTHROSCOPY KNEE          COLONOSCOPY      ,follow up due  -- s/p polyp removal --> next in 2018    COLONOSCOPY  09/10/2015    9/10/2015,27838.0,[place] COLONOSCOPY REMOVE JULIO POLYP LESN SNARE    COLONOSCOPY  2018    splenic flexure polyp    COLONOSCOPY N/A 2018    1 small tubular adenoma follow up     SHOULDER SURGERY Right 2016    SOFT TISSUE SURGERY         Social History     Tobacco Use    Smoking status: Former     Current packs/day: 0.00     Average packs/day: 0.5 packs/day for 10.0 years (5.0 ttl pk-yrs)     Types: Cigarettes     Start date: 7/10/1975     Quit date: 7/10/1985     Years since quittin.8     Passive exposure: Past    Smokeless tobacco: Former   Substance Use Topics    Alcohol use: Not Currently     Alcohol/week: 11.7 standard drinks of alcohol     Family History   Problem Relation Age of Onset    Osteoporosis Mother         Osteoporosis    Genetic Disorder Other         Genetic,Mother - Diabetes.  No FHx of CVA, No early family HX of CAD, no known cancers         Current Outpatient Medications   Medication Sig Dispense Refill    betamethasone dipropionate (DIPROSONE) 0.05 % external ointment APPLY TOPICALLY TO THE AFFECTED AREA TWICE DAILY.  "MAX USE 14 DAYS CONSECUTIVELY 45 g 1    metoprolol tartrate (LOPRESSOR) 25 MG tablet Take 1 tablet (25 mg) by mouth 2 times daily as needed (palpitations). 14 tablet 3    omeprazole (PRILOSEC) 20 MG DR capsule Take 1 capsule (20 mg) by mouth daily. 90 capsule 3    pravastatin (PRAVACHOL) 40 MG tablet Take 1 tablet (40 mg) by mouth daily. 90 tablet 3    sertraline (ZOLOFT) 50 MG tablet Take 1 tablet (50 mg) by mouth daily. 90 tablet 3     No Known Allergies  Current providers sharing in care for this patient include:  Patient Care Team:  Jarrett Ibarra PA-C as PCP - General (Family Medicine)  Jarrett Ibarra PA-C as Assigned PCP    The following health maintenance items are reviewed in Epic and correct as of today:  Health Maintenance   Topic Date Due    RSV VACCINE (1 - 1-dose 75+ series) Never done    COVID-19 Vaccine (5 - 2024-25 season) 09/01/2024    DTAP/TDAP/TD IMMUNIZATION (2 - Td or Tdap) 09/22/2024    MEDICARE ANNUAL WELLNESS VISIT  03/20/2025    LIPID  03/20/2025    ANNUAL REVIEW OF HM ORDERS  03/20/2025    FALL RISK ASSESSMENT  04/28/2026    DIABETES SCREENING  03/20/2027    COLORECTAL CANCER SCREENING  09/17/2028    ADVANCE CARE PLANNING  03/20/2029    PHQ-2 (once per calendar year)  Completed    Pneumococcal Vaccine: 50+ Years  Completed    ZOSTER IMMUNIZATION  Completed    HEPATITIS C SCREENING  Addressed    HPV IMMUNIZATION  Aged Out    MENINGITIS IMMUNIZATION  Aged Out    INFLUENZA VACCINE  Discontinued         Review of Systems  Constitutional, HEENT, cardiovascular, pulmonary, GI, , musculoskeletal, neuro, skin, and psych systems are negative, except as otherwise noted.     Objective    Exam  BP (!) 140/82   Pulse 56   Temp 98.4  F (36.9  C) (Temporal)   Resp 16   Ht 1.795 m (5' 10.67\")   Wt 87.1 kg (192 lb)   SpO2 99%   BMI 27.03 kg/m     Estimated body mass index is 27.03 kg/m  as calculated from the following:    Height as of this encounter: 1.795 m (5' 10.67\").    Weight as of this " encounter: 87.1 kg (192 lb).    Physical Exam  GENERAL: alert and no distress  EYES: Eyes grossly normal to inspection, PERRL and conjunctivae and sclerae normal  HENT: ear canals and TM's normal, nose and mouth without ulcers or lesions  NECK: no adenopathy, no asymmetry, masses, or scars  RESP: lungs clear to auscultation - no rales, rhonchi or wheezes  CV: regular rate and rhythm, normal S1 S2, no S3 or S4, no murmur, click or rub, no peripheral edema  ABDOMEN: soft, nontender, no hepatosplenomegaly, no masses and bowel sounds normal  MS: no gross musculoskeletal defects noted, no edema  PSYCH: mentation appears normal, affect normal/bright         4/28/2025   Mini Cog   Clock Draw Score 2 Normal   3 Item Recall 1 object recalled   Mini Cog Total Score 3            Seen by Dahiana WATKINS    IJarrett PA-C, was present with the Physician Assistant student who participated in the service and in the documentation of the note.  I have verified the history and personally performed the physical exam and medical decision making.  I agree with the assessment and plan of care as documented in the note.     Signed Electronically by: Jarrett Ibarra PA-C

## 2025-04-28 NOTE — PATIENT INSTRUCTIONS
Patient Education   Preventive Care Advice   This is general advice given by our system to help you stay healthy. However, your care team may have specific advice just for you. Please talk to your care team about your preventive care needs.  Nutrition  Eat 5 or more servings of fruits and vegetables each day.  Try wheat bread, brown rice and whole grain pasta (instead of white bread, rice, and pasta).  Get enough calcium and vitamin D. Check the label on foods and aim for 100% of the RDA (recommended daily allowance).  Lifestyle  Exercise at least 150 minutes each week  (30 minutes a day, 5 days a week).  Do muscle strengthening activities 2 days a week. These help control your weight and prevent disease.  No smoking.  Wear sunscreen to prevent skin cancer.  Have a dental exam and cleaning every 6 months.  Yearly exams  See your health care team every year to talk about:  Any changes in your health.  Any medicines your care team has prescribed.  Preventive care, family planning, and ways to prevent chronic diseases.  Shots (vaccines)   HPV shots (up to age 26), if you've never had them before.  Hepatitis B shots (up to age 59), if you've never had them before.  COVID-19 shot: Get this shot when it's due.  Flu shot: Get a flu shot every year.  Tetanus shot: Get a tetanus shot every 10 years.  Pneumococcal, hepatitis A, and RSV shots: Ask your care team if you need these based on your risk.  Shingles shot (for age 50 and up)  General health tests  Diabetes screening:  Starting at age 35, Get screened for diabetes at least every 3 years.  If you are younger than age 35, ask your care team if you should be screened for diabetes.  Cholesterol test: At age 39, start having a cholesterol test every 5 years, or more often if advised.  Bone density scan (DEXA): At age 50, ask your care team if you should have this scan for osteoporosis (brittle bones).  Hepatitis C: Get tested at least once in your life.  STIs (sexually  transmitted infections)  Before age 24: Ask your care team if you should be screened for STIs.  After age 24: Get screened for STIs if you're at risk. You are at risk for STIs (including HIV) if:  You are sexually active with more than one person.  You don't use condoms every time.  You or a partner was diagnosed with a sexually transmitted infection.  If you are at risk for HIV, ask about PrEP medicine to prevent HIV.  Get tested for HIV at least once in your life, whether you are at risk for HIV or not.  Cancer screening tests  Cervical cancer screening: If you have a cervix, begin getting regular cervical cancer screening tests starting at age 21.  Breast cancer scan (mammogram): If you've ever had breasts, begin having regular mammograms starting at age 40. This is a scan to check for breast cancer.  Colon cancer screening: It is important to start screening for colon cancer at age 45.  Have a colonoscopy test every 10 years (or more often if you're at risk) Or, ask your provider about stool tests like a FIT test every year or Cologuard test every 3 years.  To learn more about your testing options, visit:   .  For help making a decision, visit:   https://bit.ly/my32116.  Prostate cancer screening test: If you have a prostate, ask your care team if a prostate cancer screening test (PSA) at age 55 is right for you.  Lung cancer screening: If you are a current or former smoker ages 50 to 80, ask your care team if ongoing lung cancer screenings are right for you.  For informational purposes only. Not to replace the advice of your health care provider. Copyright   2023 [employer]. All rights reserved. Clinically reviewed by the [facility] Transitions Program. Activehours 311240 - REV 01/24.

## 2025-06-23 ENCOUNTER — TRANSFERRED RECORDS (OUTPATIENT)
Dept: HEALTH INFORMATION MANAGEMENT | Facility: CLINIC | Age: 80
End: 2025-06-23
Payer: COMMERCIAL

## 2025-07-23 ENCOUNTER — TELEPHONE (OUTPATIENT)
Dept: GASTROENTEROLOGY | Facility: CLINIC | Age: 80
End: 2025-07-23
Payer: COMMERCIAL

## 2025-07-23 ENCOUNTER — HOSPITAL ENCOUNTER (OUTPATIENT)
Facility: CLINIC | Age: 80
End: 2025-07-23
Attending: SURGERY | Admitting: SURGERY
Payer: COMMERCIAL

## 2025-07-23 NOTE — TELEPHONE ENCOUNTER
"Endoscopy Scheduling Screen    Caller: patient    Have you had any respiratory illness or flu-like symptoms in the last 10 days?  No    Patient is ACTIVE on [employer].  Inform patient that all appointment instructions will be sent via [employer].    Review patient's insurance for any non participating payor.    Ordering/Referring Provider: SAW LANDRUM   (If ordering provider performs procedure, schedule with ordering provider unless otherwise instructed. )    BMI: Estimated body mass index is 27.03 kg/m  as calculated from the following:    Height as of 4/28/25: 1.795 m (5' 10.67\").    Weight as of 4/28/25: 87.1 kg (192 lb).     Sedation Ordered  moderate sedation.   If patient BMI > 50 do not schedule in ASC.    If patient BMI > 45 do not schedule at [facility].    Are you taking methadone or Suboxone?  NO, No RN review required.    Have you been diagnosed and are being treated for severe PTSD or severe anxiety?  NO, No RN review required.    Are you taking any prescription medications for pain 3 or more times per week?   NO, No RN review required.    Do you have a history of malignant hyperthermia?  No    (Females) Are you currently pregnant?   No     Have you been diagnosed or told you have pulmonary hypertension?   No    Do you have an LVAD?  No    Have you been told you have moderate to severe sleep apnea?  No.    Have you been told you have COPD, asthma, or any other lung disease?  No    Has your doctor ordered any cardiac tests like echo, angiogram, stress test, ablation, or EKG, that you have not completed yet?  No    Do you  have a history of any heart conditions?  No     Have you ever had or are you waiting for an organ transplant?  No. Continue scheduling, no site restrictions.    Have you had a stroke or transient ischemic attack (TIA aka \"mini stroke\") in the last 2 years?   No.    Have you been diagnosed with or been told you have cirrhosis of the liver?   No.    Are you currently on dialysis?   No    Do you " "need assistance transferring?   No    BMI: Estimated body mass index is 27.03 kg/m  as calculated from the following:    Height as of 4/28/25: 1.795 m (5' 10.67\").    Weight as of 4/28/25: 87.1 kg (192 lb).     Is patients BMI > 40 and scheduling location [facility]?  No    Do you take an injectable or oral medication for weight loss or diabetes (excluding insulin)?  No    Do you take the medication Naltrexone?  No    Do you take blood thinners?  No       Prep   Are you currently on dialysis or do you have chronic kidney disease?  No    Do you have a diagnosis of diabetes?  No    Do you have a diagnosis of cystic fibrosis (CF)?  No    On a regular basis do you go 3 -5 days between bowel movements?  No    BMI > 40?  No    Preferred Pharmacy:    [id number] - [place], [place] [facility] [address] [facility] [address] [facility] [address]  [address] [address]  Phone: [phone] Fax: [phone]    Final Scheduling Details     Procedure scheduled  Colonoscopy    Surgeon:  VAMSI     Date of procedure:  8/28/25     Pre-OP / PAC:   No - Not required for this site.    Location  [facility] - Per order.    Sedation   MAC/Deep Sedation Per location.      Patient Reminders:   You will receive a call from a Nurse to review instructions and health history.  This assessment must be completed prior to your procedure.  Failure to complete the Nurse assessment may result in the procedure being cancelled.      On the day of your procedure, please designate an adult(s) who can drive you home stay with you for the next 24 hours. The medicines used in the exam will make you sleepy. You will not be able to drive.      You cannot take public transportation, ride share services, or non-medical taxi service without a responsible caregiver.  Medical transport services are allowed with the requirement that a responsible caregiver will receive you at your destination.  We require that drivers and caregivers are confirmed prior to " your procedure.

## 2025-07-24 ENCOUNTER — HOSPITAL ENCOUNTER (EMERGENCY)
Facility: CLINIC | Age: 80
Discharge: HOME | End: 2025-07-24
Attending: EMERGENCY MEDICINE
Payer: COMMERCIAL

## 2025-07-24 ENCOUNTER — APPOINTMENT (OUTPATIENT)
Dept: ULTRASOUND IMAGING | Facility: CLINIC | Age: 80
End: 2025-07-24
Attending: EMERGENCY MEDICINE
Payer: COMMERCIAL

## 2025-07-24 ENCOUNTER — APPOINTMENT (OUTPATIENT)
Dept: CT IMAGING | Facility: CLINIC | Age: 80
End: 2025-07-24
Attending: EMERGENCY MEDICINE
Payer: COMMERCIAL

## 2025-07-24 ENCOUNTER — APPOINTMENT (OUTPATIENT)
Dept: NUCLEAR MEDICINE | Facility: CLINIC | Age: 80
End: 2025-07-24
Attending: EMERGENCY MEDICINE
Payer: COMMERCIAL

## 2025-07-24 VITALS
HEART RATE: 61 BPM | SYSTOLIC BLOOD PRESSURE: 127 MMHG | OXYGEN SATURATION: 96 % | HEIGHT: 72 IN | BODY MASS INDEX: 24.79 KG/M2 | DIASTOLIC BLOOD PRESSURE: 73 MMHG | WEIGHT: 183 LBS | TEMPERATURE: 97.6 F | RESPIRATION RATE: 20 BRPM

## 2025-07-24 DIAGNOSIS — K82.8 GALLBLADDER SLUDGE: ICD-10-CM

## 2025-07-24 DIAGNOSIS — K81.2 ACUTE ON CHRONIC CHOLECYSTITIS: ICD-10-CM

## 2025-07-24 DIAGNOSIS — R10.11 RUQ ABDOMINAL PAIN: Primary | ICD-10-CM

## 2025-07-24 LAB
ALBUMIN SERPL BCG-MCNC: 3.8 G/DL (ref 3.5–5.2)
ALP SERPL-CCNC: 47 U/L (ref 40–150)
ALT SERPL W P-5'-P-CCNC: 19 U/L (ref 0–70)
ANION GAP SERPL CALCULATED.3IONS-SCNC: 8 MMOL/L (ref 7–15)
AST SERPL W P-5'-P-CCNC: 25 U/L (ref 0–45)
ATRIAL RATE - MUSE: 52 BPM
BASOPHILS # BLD AUTO: 0 10E3/UL (ref 0–0.2)
BASOPHILS NFR BLD AUTO: 1 %
BILIRUB SERPL-MCNC: 0.3 MG/DL
BUN SERPL-MCNC: 19.4 MG/DL (ref 8–23)
CALCIUM SERPL-MCNC: 8.9 MG/DL (ref 8.8–10.4)
CHLORIDE SERPL-SCNC: 101 MMOL/L (ref 98–107)
CREAT SERPL-MCNC: 1.01 MG/DL (ref 0.67–1.17)
DIASTOLIC BLOOD PRESSURE - MUSE: NORMAL MMHG
EGFRCR SERPLBLD CKD-EPI 2021: 75 ML/MIN/1.73M2
EOSINOPHIL # BLD AUTO: 0.2 10E3/UL (ref 0–0.7)
EOSINOPHIL NFR BLD AUTO: 4 %
ERYTHROCYTE [DISTWIDTH] IN BLOOD BY AUTOMATED COUNT: 13.6 % (ref 10–15)
GLUCOSE SERPL-MCNC: 120 MG/DL (ref 70–99)
HCO3 SERPL-SCNC: 24 MMOL/L (ref 22–29)
HCT VFR BLD AUTO: 38.4 % (ref 40–53)
HGB BLD-MCNC: 13.4 G/DL (ref 13.3–17.7)
IMM GRANULOCYTES # BLD: 0 10E3/UL
IMM GRANULOCYTES NFR BLD: 0 %
INTERPRETATION ECG - MUSE: NORMAL
LIPASE SERPL-CCNC: 68 U/L (ref 13–60)
LYMPHOCYTES # BLD AUTO: 1.3 10E3/UL (ref 0.8–5.3)
LYMPHOCYTES NFR BLD AUTO: 25 %
MCH RBC QN AUTO: 32.4 PG (ref 26.5–33)
MCHC RBC AUTO-ENTMCNC: 34.9 G/DL (ref 31.5–36.5)
MCV RBC AUTO: 93 FL (ref 78–100)
MONOCYTES # BLD AUTO: 0.8 10E3/UL (ref 0–1.3)
MONOCYTES NFR BLD AUTO: 15 %
NEUTROPHILS # BLD AUTO: 3.1 10E3/UL (ref 1.6–8.3)
NEUTROPHILS NFR BLD AUTO: 56 %
NRBC # BLD AUTO: 0 10E3/UL
NRBC BLD AUTO-RTO: 0 /100
P AXIS - MUSE: 55 DEGREES
PLATELET # BLD AUTO: 175 10E3/UL (ref 150–450)
POTASSIUM SERPL-SCNC: 3.9 MMOL/L (ref 3.4–5.3)
PR INTERVAL - MUSE: 164 MS
PROT SERPL-MCNC: 6.7 G/DL (ref 6.4–8.3)
QRS DURATION - MUSE: 98 MS
QT - MUSE: 446 MS
QTC - MUSE: 414 MS
R AXIS - MUSE: -24 DEGREES
RBC # BLD AUTO: 4.14 10E6/UL (ref 4.4–5.9)
SODIUM SERPL-SCNC: 133 MMOL/L (ref 135–145)
SYSTOLIC BLOOD PRESSURE - MUSE: NORMAL MMHG
T AXIS - MUSE: 60 DEGREES
TROPONIN T SERPL HS-MCNC: 8 NG/L
TROPONIN T SERPL HS-MCNC: 8 NG/L
VENTRICULAR RATE- MUSE: 52 BPM
WBC # BLD AUTO: 5.4 10E3/UL (ref 4–11)

## 2025-07-24 PROCEDURE — 71275 CT ANGIOGRAPHY CHEST: CPT

## 2025-07-24 PROCEDURE — 96361 HYDRATE IV INFUSION ADD-ON: CPT | Performed by: EMERGENCY MEDICINE

## 2025-07-24 PROCEDURE — A9537 TC99M MEBROFENIN: HCPCS | Performed by: EMERGENCY MEDICINE

## 2025-07-24 PROCEDURE — 99285 EMERGENCY DEPT VISIT HI MDM: CPT | Mod: 25 | Performed by: EMERGENCY MEDICINE

## 2025-07-24 PROCEDURE — 99284 EMERGENCY DEPT VISIT MOD MDM: CPT | Performed by: EMERGENCY MEDICINE

## 2025-07-24 PROCEDURE — 83690 ASSAY OF LIPASE: CPT | Performed by: EMERGENCY MEDICINE

## 2025-07-24 PROCEDURE — 36415 COLL VENOUS BLD VENIPUNCTURE: CPT | Performed by: EMERGENCY MEDICINE

## 2025-07-24 PROCEDURE — 258N000003 HC RX IP 258 OP 636: Performed by: EMERGENCY MEDICINE

## 2025-07-24 PROCEDURE — 343N000001 HC RX 343 MED OP 636: Performed by: EMERGENCY MEDICINE

## 2025-07-24 PROCEDURE — 96374 THER/PROPH/DIAG INJ IV PUSH: CPT | Mod: 59 | Performed by: EMERGENCY MEDICINE

## 2025-07-24 PROCEDURE — 250N000011 HC RX IP 250 OP 636: Performed by: STUDENT IN AN ORGANIZED HEALTH CARE EDUCATION/TRAINING PROGRAM

## 2025-07-24 PROCEDURE — 96376 TX/PRO/DX INJ SAME DRUG ADON: CPT | Performed by: EMERGENCY MEDICINE

## 2025-07-24 PROCEDURE — 84132 ASSAY OF SERUM POTASSIUM: CPT | Performed by: EMERGENCY MEDICINE

## 2025-07-24 PROCEDURE — 85025 COMPLETE CBC W/AUTO DIFF WBC: CPT | Performed by: EMERGENCY MEDICINE

## 2025-07-24 PROCEDURE — 250N000009 HC RX 250: Performed by: EMERGENCY MEDICINE

## 2025-07-24 PROCEDURE — 96375 TX/PRO/DX INJ NEW DRUG ADDON: CPT | Performed by: EMERGENCY MEDICINE

## 2025-07-24 PROCEDURE — 93010 ELECTROCARDIOGRAM REPORT: CPT | Performed by: EMERGENCY MEDICINE

## 2025-07-24 PROCEDURE — 250N000011 HC RX IP 250 OP 636: Performed by: EMERGENCY MEDICINE

## 2025-07-24 PROCEDURE — 93005 ELECTROCARDIOGRAM TRACING: CPT | Performed by: EMERGENCY MEDICINE

## 2025-07-24 PROCEDURE — 76705 ECHO EXAM OF ABDOMEN: CPT

## 2025-07-24 PROCEDURE — 78227 HEPATOBIL SYST IMAGE W/DRUG: CPT

## 2025-07-24 PROCEDURE — 84484 ASSAY OF TROPONIN QUANT: CPT | Mod: 91 | Performed by: EMERGENCY MEDICINE

## 2025-07-24 RX ORDER — PRAVASTATIN SODIUM 40 MG
40 TABLET ORAL AT BEDTIME
COMMUNITY

## 2025-07-24 RX ORDER — ONDANSETRON 4 MG/1
4 TABLET, ORALLY DISINTEGRATING ORAL EVERY 6 HOURS PRN
Qty: 10 TABLET | Refills: 0 | Status: SHIPPED | OUTPATIENT
Start: 2025-07-24 | End: 2025-07-27

## 2025-07-24 RX ORDER — SENNOSIDES 8.6 MG
650 CAPSULE ORAL DAILY PRN
COMMUNITY

## 2025-07-24 RX ORDER — OMEPRAZOLE 20 MG/1
20 CAPSULE, DELAYED RELEASE ORAL DAILY PRN
COMMUNITY

## 2025-07-24 RX ORDER — KIT FOR THE PREPARATION OF TECHNETIUM TC 99M MEBROFENIN 45 MG/10ML
4.5 INJECTION, POWDER, LYOPHILIZED, FOR SOLUTION INTRAVENOUS ONCE
Status: COMPLETED | OUTPATIENT
Start: 2025-07-24 | End: 2025-07-24

## 2025-07-24 RX ORDER — MORPHINE SULFATE 2 MG/ML
2 INJECTION, SOLUTION INTRAMUSCULAR; INTRAVENOUS ONCE
Refills: 0 | Status: COMPLETED | OUTPATIENT
Start: 2025-07-24 | End: 2025-07-24

## 2025-07-24 RX ORDER — IOPAMIDOL 755 MG/ML
500 INJECTION, SOLUTION INTRAVASCULAR ONCE
Status: COMPLETED | OUTPATIENT
Start: 2025-07-24 | End: 2025-07-24

## 2025-07-24 RX ORDER — OXYCODONE HYDROCHLORIDE 5 MG/1
5 TABLET ORAL EVERY 6 HOURS PRN
Qty: 12 TABLET | Refills: 0 | Status: SHIPPED | OUTPATIENT
Start: 2025-07-24 | End: 2025-07-27

## 2025-07-24 RX ORDER — DIAZEPAM 10 MG/2ML
2.5 INJECTION, SOLUTION INTRAMUSCULAR; INTRAVENOUS ONCE
Status: DISCONTINUED | OUTPATIENT
Start: 2025-07-24 | End: 2025-07-24 | Stop reason: HOSPADM

## 2025-07-24 RX ORDER — KIT FOR THE PREPARATION OF TECHNETIUM TC 99M MEBROFENIN 45 MG/10ML
5.3 INJECTION, POWDER, LYOPHILIZED, FOR SOLUTION INTRAVENOUS ONCE
Status: COMPLETED | OUTPATIENT
Start: 2025-07-24 | End: 2025-07-24

## 2025-07-24 RX ORDER — SODIUM CHLORIDE 9 MG/ML
INJECTION, SOLUTION INTRAVENOUS CONTINUOUS
Status: DISCONTINUED | OUTPATIENT
Start: 2025-07-24 | End: 2025-07-24 | Stop reason: HOSPADM

## 2025-07-24 RX ORDER — ONDANSETRON 2 MG/ML
4 INJECTION INTRAMUSCULAR; INTRAVENOUS EVERY 30 MIN PRN
Status: DISCONTINUED | OUTPATIENT
Start: 2025-07-24 | End: 2025-07-24 | Stop reason: HOSPADM

## 2025-07-24 RX ORDER — HYDROMORPHONE HYDROCHLORIDE 1 MG/ML
0.5 INJECTION, SOLUTION INTRAMUSCULAR; INTRAVENOUS; SUBCUTANEOUS EVERY 30 MIN PRN
Refills: 0 | Status: DISCONTINUED | OUTPATIENT
Start: 2025-07-24 | End: 2025-07-24 | Stop reason: HOSPADM

## 2025-07-24 RX ADMIN — MEBROFENIN 4.5 MILLICURIE: 45 INJECTION, POWDER, LYOPHILIZED, FOR SOLUTION INTRAVENOUS at 12:05

## 2025-07-24 RX ADMIN — MORPHINE SULFATE 2 MG: 2 INJECTION, SOLUTION INTRAMUSCULAR; INTRAVENOUS at 13:06

## 2025-07-24 RX ADMIN — SODIUM CHLORIDE: 0.9 INJECTION, SOLUTION INTRAVENOUS at 05:04

## 2025-07-24 RX ADMIN — HYDROMORPHONE HYDROCHLORIDE 0.5 MG: 1 INJECTION, SOLUTION INTRAMUSCULAR; INTRAVENOUS; SUBCUTANEOUS at 01:32

## 2025-07-24 RX ADMIN — MEBROFENIN 5.3 MILLICURIE: 45 INJECTION, POWDER, LYOPHILIZED, FOR SOLUTION INTRAVENOUS at 13:05

## 2025-07-24 RX ADMIN — SODIUM CHLORIDE 70 ML: 9 INJECTION, SOLUTION INTRAVENOUS at 01:17

## 2025-07-24 RX ADMIN — ONDANSETRON 4 MG: 2 INJECTION, SOLUTION INTRAMUSCULAR; INTRAVENOUS at 00:41

## 2025-07-24 RX ADMIN — SODIUM CHLORIDE 1000 ML: 0.9 INJECTION, SOLUTION INTRAVENOUS at 00:41

## 2025-07-24 RX ADMIN — SODIUM CHLORIDE: 0.9 INJECTION, SOLUTION INTRAVENOUS at 09:48

## 2025-07-24 RX ADMIN — IOPAMIDOL 90 ML: 755 INJECTION, SOLUTION INTRAVENOUS at 01:18

## 2025-07-24 RX ADMIN — HYDROMORPHONE HYDROCHLORIDE 0.5 MG: 1 INJECTION, SOLUTION INTRAMUSCULAR; INTRAVENOUS; SUBCUTANEOUS at 00:40

## 2025-07-24 ASSESSMENT — ACTIVITIES OF DAILY LIVING (ADL)
ADLS_ACUITY_SCORE: 41

## 2025-07-24 ASSESSMENT — COLUMBIA-SUICIDE SEVERITY RATING SCALE - C-SSRS
2. HAVE YOU ACTUALLY HAD ANY THOUGHTS OF KILLING YOURSELF IN THE PAST MONTH?: NO
1. IN THE PAST MONTH, HAVE YOU WISHED YOU WERE DEAD OR WISHED YOU COULD GO TO SLEEP AND NOT WAKE UP?: NO
6. HAVE YOU EVER DONE ANYTHING, STARTED TO DO ANYTHING, OR PREPARED TO DO ANYTHING TO END YOUR LIFE?: NO

## 2025-07-24 NOTE — ED PROVIDER NOTES
"SIGN OUT NOTE    Patient signed out to me at change of shift by Dr. Martin.    This is an 80-year-old male who presented with right upper quadrant/right rib pain.  Full workup showed a distended gallbladder, normal LFTs.  He is awaiting a HIDA scan.  He has received Dilaudid for pain and has preprocedure Valium ordered.    Patient was concerned on the length of time it was taken to get the procedure done.  We have been waiting for the nuclear medicine to arrive at the .    I do long discussion with the patient and his wife regarding HIDA scan and what we were looking for.  Apparently he has had 1 other \"attack\" a number of years ago but otherwise the overnight presentation was the first time in quite some time.  He notes that he has some reflux symptoms and sometimes has some increased thickness of phlegm in the back of his mouth.  Currently he denies any significant abdominal pain or nausea.    We were finally able to get the HIDA scan.    Narrative & Impression   EXAM: NM HEPATOBILIARY SCAN WITH GB EF AND/OR PHARM  LOCATION: [facility]  DATE: 7/24/2025     INDICATION: RUQ abdominal pain, nausea, gallbladder sludge on US, distended GB on CT  COMPARISON: Abdominal ultrasound 7/24/2025.  TECHNIQUE: 5.3 mCi of technetium-99m mebrofenin, IV. Anterior planar imaging of the abdomen. 2.0 mg morphine, IV. Gallbladder imaging was performed.     FINDINGS: Normal radionuclide activity in liver, bile ducts, and small bowel, however the gallbladder is not visualized after 60 minutes. Therefore, morphine was administered. After the administration of morphine, radiotracer fills the gallbladder,   suggesting a patent cystic duct (no acute cholecystitis), however the delayed filling suggest a component of chronic inflammation (such as chronic cholecystitis).                                                                         IMPRESSION:      Findings suspicious for chronic cholecystitis. "     Results as noted above.  Chronic cholecystitis.    Spoke with Dr. Marin, surgery.  He recommended low-fat diet, follow-up in clinic.  Referral sent for surgery follow-up.  I discussed all the results and the plans with the patient and his wife.  And going to discharge him with small amount of pain and nausea medications.  If he has uncontrolled pain, vomiting medications, fevers or other concerns return at any time.  Low-fat diet discussed.    Clinical impression:  (R10.11) RUQ abdominal pain  (primary encounter diagnosis)  Plan: Adult Gen Surg  Referral    (K82.8) Gallbladder sludge    (K81.2) Acute on chronic cholecystitis    Note: Chart documentation done in part with Dragon Voice Recognition software. Although reviewed after completion, some word and grammatical errors may remain.         Estephania Man MD  07/24/25 1492

## 2025-07-24 NOTE — ED PROVIDER NOTES
"  History     Chief Complaint   Patient presents with    Rib Pain     HPI  Lauro Pritchard is a 80 year old male who presents with concern of rib pain.  He states the pain is on the right side of his ribs and \"is really severe\".  It woke him from sleep tonight.  Has not changed in character.  He took 2 aspirin at home before coming to the emergency room.  Pain does not change with deep inspiration.  He says that he has some nausea and an awful taste in his mouth.  Has not been vomiting.  Is denying chest pain or shortness of breath.  Recently he says that he has been feeling unwell.  Has difficulty describing what this means, said that he felt very weak, but otherwise does not describe what he means by feeling ill.  Has never had any abdominal surgery.    Allergies:  No Known Allergies    Problem List:    Patient Active Problem List    Diagnosis Date Noted    Prediabetes 04/28/2025     Priority: Medium    Diverticulosis of large intestine 03/20/2024     Priority: Medium    Supraventricular tachycardia 03/20/2024     Priority: Medium    Cervical stenosis of spinal canal 01/14/2019     Priority: Medium    Neuroforaminal stenosis of cervical spine 11/28/2018     Priority: Medium    Sigmoid diverticulosis 09/17/2018     Priority: Medium    Chronic neck pain 08/15/2018     Priority: Medium    History of prediabetes 07/12/2017     Priority: Medium    Arthritis of right acromioclavicular joint 12/21/2016     Priority: Medium    Complete tear of right rotator cuff 12/21/2016     Priority: Medium    History of colon polyps, tubular adenoma x1, next scope 2028 04/22/2015     Priority: Medium    Mixed hyperlipidemia 09/17/2014     Priority: Medium    Health care maintenance 09/11/2014     Priority: Medium    History of tobacco use 09/11/2014     Priority: Medium        Past Medical History:    Past Medical History:   Diagnosis Date    Anxiety disorder     Arthritis neck and knee    History of adenomatous polyp of colon  "       Past Surgical History:    Past Surgical History:   Procedure Laterality Date    ARTHROSCOPY KNEE      2015    COLONOSCOPY      ,follow up due  -- s/p polyp removal --> next in 2018    COLONOSCOPY  09/10/2015    9/10/2015,36636.0,IL COLONOSCOPY REMOVE JULIO POLYP LESN SNARE    COLONOSCOPY  2018    splenic flexure polyp    COLONOSCOPY N/A 2018    1 small tubular adenoma follow up     SHOULDER SURGERY Right 2016    SOFT TISSUE SURGERY         Family History:    Family History   Problem Relation Age of Onset    Osteoporosis Mother         Osteoporosis    Genetic Disorder Other         Genetic,Mother - Diabetes.  No FHx of CVA, No early family HX of CAD, no known cancers       Social History:  Marital Status:   [2]  Social History     Tobacco Use    Smoking status: Former     Current packs/day: 0.00     Average packs/day: 0.5 packs/day for 10.0 years (5.0 ttl pk-yrs)     Types: Cigarettes     Start date: 7/10/1975     Quit date: 7/10/1985     Years since quittin.0     Passive exposure: Past    Smokeless tobacco: Former   Vaping Use    Vaping status: Never Used   Substance Use Topics    Alcohol use: Not Currently     Alcohol/week: 11.7 standard drinks of alcohol    Drug use: Never        Medications:    betamethasone dipropionate (DIPROSONE) 0.05 % external ointment  metoprolol tartrate (LOPRESSOR) 25 MG tablet  omeprazole (PRILOSEC) 20 MG DR capsule  pravastatin (PRAVACHOL) 40 MG tablet  sertraline (ZOLOFT) 50 MG tablet          Review of Systems   All other systems reviewed and are negative.      Physical Exam   BP: (!) 159/107  Pulse: 57  Temp: 97.6  F (36.4  C)  Resp: 18  Height: 182.9 cm (6')  Weight: 83 kg (183 lb)  SpO2: 98 %      Physical Exam  Vitals and nursing note reviewed.   Constitutional:       General: He is not in acute distress.     Appearance: He is not toxic-appearing or diaphoretic.   Cardiovascular:      Rate and Rhythm: Normal rate and regular rhythm.    Pulmonary:      Effort: Pulmonary effort is normal.      Breath sounds: Normal breath sounds.   Chest:      Chest wall: No tenderness.   Abdominal:      General: Bowel sounds are normal.      Palpations: Abdomen is soft.      Tenderness: There is abdominal tenderness in the right upper quadrant and right lower quadrant. Positive signs include McBurney's sign. Negative signs include Haney's sign.   Skin:     General: Skin is warm and dry.   Neurological:      General: No focal deficit present.      Mental Status: He is alert and oriented to person, place, and time.         ED Course        Procedures              EKG Interpretation:      Interpreted by Liane Martin DO  Time reviewed: 0037  Symptoms at time of EKG: Abdominal pain, nausea  Rhythm: sinus bradycardia  Rate: 82 bpm  Axis: Normal  Ectopy: none  Conduction: normal  ST Segments/ T Waves: No acute ischemic changes  Q Waves: none  Comparison to prior: Unchanged    Clinical Impression: no acute changes and sinus bradycardia            Critical Care time:  none              Recent Results (from the past 24 hours)   EKG 12-lead, tracing only   Result Value Ref Range    Systolic Blood Pressure  mmHg    Diastolic Blood Pressure  mmHg    Ventricular Rate 52 BPM    Atrial Rate 52 BPM    DC Interval 164 ms    QRS Duration 98 ms     ms    QTc 414 ms    P Axis 55 degrees    R AXIS -24 degrees    T Axis 60 degrees    Interpretation ECG       Sinus bradycardia  Otherwise normal ECG  No previous ECGs available  Confirmed by SEE ED PROVIDER NOTE FOR, ECG INTERPRETATION ([id number]),  Camden Saleh ([id number]) on 7/24/2025 12:43:33 AM     CBC with Platelets and Differential (Limited Occurrences)    Narrative    The following orders were created for panel order CBC with Platelets and Differential (Limited Occurrences).  Procedure                               Abnormality         Status                     ---------                               -----------          ------                     CBC with platelets and ...[[id number]]  Abnormal            Final result                 Please view results for these tests on the individual orders.   Comprehensive Metabolic Panel (Limited Occurrences)   Result Value Ref Range    Sodium 133 (L) 135 - 145 mmol/L    Potassium 3.9 3.4 - 5.3 mmol/L    Carbon Dioxide (CO2) 24 22 - 29 mmol/L    Anion Gap 8 7 - 15 mmol/L    Urea Nitrogen 19.4 8.0 - 23.0 mg/dL    Creatinine 1.01 0.67 - 1.17 mg/dL    GFR Estimate 75 >60 mL/min/1.73m2    Calcium 8.9 8.8 - 10.4 mg/dL    Chloride 101 98 - 107 mmol/L    Glucose 120 (H) 70 - 99 mg/dL    Alkaline Phosphatase 47 40 - 150 U/L    AST 25 0 - 45 U/L    ALT 19 0 - 70 U/L    Protein Total 6.7 6.4 - 8.3 g/dL    Albumin 3.8 3.5 - 5.2 g/dL    Bilirubin Total 0.3 <=1.2 mg/dL   Lipase   Result Value Ref Range    Lipase 68 (H) 13 - 60 U/L   Troponin T, High Sensitivity   Result Value Ref Range    Troponin T, High Sensitivity 8 <=22 ng/L   CBC with platelets and differential   Result Value Ref Range    WBC Count 5.4 4.0 - 11.0 10e3/uL    RBC Count 4.14 (L) 4.40 - 5.90 10e6/uL    Hemoglobin 13.4 13.3 - 17.7 g/dL    Hematocrit 38.4 (L) 40.0 - 53.0 %    MCV 93 78 - 100 fL    MCH 32.4 26.5 - 33.0 pg    MCHC 34.9 31.5 - 36.5 g/dL    RDW 13.6 10.0 - 15.0 %    Platelet Count 175 150 - 450 10e3/uL    % Neutrophils 56 %    % Lymphocytes 25 %    % Monocytes 15 %    % Eosinophils 4 %    % Basophils 1 %    % Immature Granulocytes 0 %    NRBCs per 100 WBC 0 <1 /100    Absolute Neutrophils 3.1 1.6 - 8.3 10e3/uL    Absolute Lymphocytes 1.3 0.8 - 5.3 10e3/uL    Absolute Monocytes 0.8 0.0 - 1.3 10e3/uL    Absolute Eosinophils 0.2 0.0 - 0.7 10e3/uL    Absolute Basophils 0.0 0.0 - 0.2 10e3/uL    Absolute Immature Granulocytes 0.0 <=0.4 10e3/uL    Absolute NRBCs 0.0 10e3/uL   CT Chest PE Abdomen Pelvis w Contrast    Narrative    EXAM: CT CHEST PE ABDOMEN PELVIS W CONTRAST  LOCATION: [facility]  DATE: 7/24/2025    INDICATION: Right sided rib pain, right sided abdominal pain, nausea  COMPARISON: None.  TECHNIQUE: CT chest pulmonary angiogram and routine CT abdomen pelvis with IV contrast. Arterial phase through the chest and venous phase through the abdomen and pelvis. Multiplanar reformats and MIP reconstructions were performed. Dose reduction   techniques were used.   CONTRAST: 90mL Isovue 370    FINDINGS:  ANGIOGRAM CHEST: Pulmonary arteries are normal caliber and negative for pulmonary emboli. Thoracic aorta is not well opacified and is  indeterminate for dissection. No CT evidence of right heart strain.     LUNGS AND PLEURA: A few scattered bilateral pleural calcifications. Bibasilar atelectasis. No airspace consolidation. No pneumothorax or pleural effusion.    MEDIASTINUM/AXILLAE: Borderline cardiomegaly. Small hiatal hernia. No pericardial effusion. Coronary artery calcifications.    CORONARY ARTERY CALCIFICATION: Mild-moderate    HEPATOBILIARY: Distended but otherwise unremarkable gallbladder. Liver is normal.    PANCREAS: Normal.    SPLEEN: Normal.    ADRENAL GLANDS: Normal.    KIDNEYS/BLADDER: No hydronephrosis. Normal bladder.    BOWEL: Air-fluid level in the stomach. Normal caliber small bowel. Normal appendix. Distal colonic diverticulosis. No free air.    LYMPH NODES: Normal.    VASCULATURE: Aortoiliac atherosclerotic change without aneurysm.    PELVIC ORGANS: Normal.    MUSCULOSKELETAL: Degenerative changes of the lumbar spine.      Impression    IMPRESSION:  1.  Negative for pulmonary embolism.    2.  No acute, displaced rib fracture.    3.  Distended gallbladder. Recommend ultrasound evaluation in light of right sided abdominal pain and nausea.    4.  Colonic diverticulosis.   Troponin T, High Sensitivity   Result Value Ref Range    Troponin T, High Sensitivity 8 <=22 ng/L   US Abdomen Limited (RUQ)    Narrative    EXAM: US ABDOMEN LIMITED  LOCATION: [facility]  DATE: 7/24/2025    INDICATION: Right upper quadrant abdominal pain, nausea, distended gallbladder on CT.  COMPARISON: CT from 7/24/2025.  TECHNIQUE: Limited abdominal ultrasound.    FINDINGS:    GALLBLADDER: Gallbladder is distended and contains some sludge material. No discrete gallstones are seen. Slight gallbladder wall thickening measuring 4 mm. No pericholecystic fluid. Sonographic Haney's sign is negative.    BILE DUCTS: No biliary dilatation. The common duct measures 5 mm.    LIVER: Normal parenchyma with smooth contour. No focal mass. The portal vein is patent with flow in the normal direction.    RIGHT KIDNEY: No hydronephrosis.    PANCREAS: The visualized portions are normal.    No ascites.      Impression    IMPRESSION:  1.  Somewhat equivocal findings in the gallbladder. Gallbladder is distended with some sludge material and slight gallbladder wall thickening. However, sonographic Haney's sign is negative. A mild or low-grade cholecystitis would be difficult to   exclude. If there remains clinical uncertainty, a nuclear medicine hepatobiliary scan would be helpful in further evaluation.    2.  Remainder of the right upper quadrant ultrasound is negative. No biliary dilatation.           Medications   ondansetron (ZOFRAN) injection 4 mg (4 mg Intravenous $Given 7/24/25 0041)   HYDROmorphone (PF) (DILAUDID) injection 0.5 mg (0.5 mg Intravenous $Given 7/24/25 0132)   diazepam (VALIUM) injection 2.5 mg (has no administration in time range)   sodium chloride 0.9 % infusion (has no administration in time range)   sodium chloride 0.9% BOLUS 1,000 mL (0 mLs Intravenous Stopped 7/24/25 0151)   iopamidol (ISOVUE-370) solution 500 mL (90 mLs Intravenous $Given 7/24/25 0118)   sodium chloride 0.9 % bag for CT scan flush (70 mLs Intravenous $Given 7/24/25 0117)       Assessments & Plan (with Medical Decision Making)  Charles is an 80-year-old male presenting with concern of right side pain that woke him  from sleep tonight.  See history and physical exam as above  80-year-old male in no acute distress, not diaphoretic, hypertensive with blood pressure 159/107 but otherwise vitally stable and afebrile.  When asked about the location of his pain, he points to right upper quadrant and says it is right up under his ribs.  Palpation of this area does cause some mild discomfort, but Haney sign was negative.  Surprisingly, he had pain with palpation in the right lower quadrant even though he did not indicate any discomfort or radiation to that area previously.  Abdomen was soft without distention.  Normal bowel sounds throughout.  He does not have any palpation over the ribs on the right side of his chest wall.  Heart and lung sounds are clear.  This includes cholecystitis, cholelithiasis, appendicitis, ACS, right lower lobe pneumonia.  Will get lab work, imaging, and an EKG.  Zofran and Dilaudid to help with the symptoms  Lab results as above.  There is a slight elevation in the lipase at 68, but it is not 3 times the upper limit of normal, and given the lack of epigastric pain, do not have suspicion for pancreatitis.  White blood cell count was within normal limits.  Initial troponin was normal at 8.  LFTs are within normal limits.  Still awaiting imaging results.  EKG was performed and shows sinus bradycardia without acute ST elevations, ectopy, or other abnormality.  When compared with previous EKGs do not show any acute change.  CT shows dilated gallbladder without evidence of acute obstruction.  Pancreas appeared normal.  Appendix appeared normal.  Otherwise no acute concerning findings on CT PE study chest, CT of the abdomen pelvis.  Updated Don and his wife on the findings.  After 2 doses of Dilaudid he states that his pain has finally improved.  Has not had any emesis here in the ED.  Does remain somewhat hypertensive but overall appears unchanged.  I explained the findings and my concerns about the dilated  gallbladder noted on CT.  Though LFTs are normal, would like to get an ultrasound for evaluation.  They are agreeable to plan.  Second troponin had already been drawn and resulted, remained stable at 8.  Do not think this is atypical presentation of ACS.  Ultrasound results as above.  No definitive stones noted in the gallbladder, but sludge was identified.  Common bile duct is 5 mm, for patient's age this is considered normal.  However, could not definitively rule out cholecystitis.  Recommended biliary scan for additional evaluation  Discussed these results again with Don and his wife.  At this juncture, since he is afebrile, there is no jaundice noted on exam, has normal LFTs, and normal white count, I have very low suspicion for cholecystitis.  However there are findings, and I stated that patient could remain boarding in the ED to have the nuc med test in several hours when MRI is available.  Otherwise, if he would prefer to discharge at this time, would send with pain medication and nausea medication, send a message to his primary provider, in order for nuc med testing to be completed on an outpatient basis.  Ultimately, patient and his wife decided they wanted to have the test, so he is waiting in the ED until this can be completed.  It was ordered, with a dose of IV Valium to be given prior to the test since patient notes he is claustrophobic.  He is to remain n.p.o., so IV normal saline infusion was ordered.  He was given swabs to help wet his mouth since he needs to remain NPO.  Will sign out to Dr. Estephania Man at shift change to follow-up on nuc med results and ultimate disposition of the patient.  Already established with Dr. Hector a well, general surgeon, and is due to have colonoscopy in 1 month.  He does need any outpatient follow-up for surgical consultation for elective gallbladder removal, he could follow-up with Dr. Hector as well.  If there are more acute findings, would need to contact  general surgery on-call for recommendations     I have reviewed the nursing notes.    I have reviewed the findings, diagnosis, plan and need for follow up with the patient.           Medical Decision Making  The patient's presentation was of moderate complexity (an undiagnosed new problem with uncertain prognosis).    The patient's evaluation involved:  ordering and/or review of 3+ test(s) in this encounter (see separate area of note for details)    The patient's management necessitated moderate risk (IV contrast administration), high risk (a parenteral controlled substance), and further care after sign-out to Dr. Man (see their note for further management).        New Prescriptions    No medications on file       Final diagnoses:   RUQ abdominal pain   Gallbladder sludge       7/24/2025   [facility] EMERGENCY DEPT       Liane Martin DO  07/24/25 5660

## 2025-07-24 NOTE — ED TRIAGE NOTES
"Pt states he is having pain right under his ribcage on the right side that woke him from sleep suddenly. Pt pointing to right upper abdominal quadrant but states he is not having abdominal pain. Pt c/o nausea and an \"awful taste\" his mouth. Aspirin taken 1 hour pta. Denies SOA and chest pain.     Triage Assessment (Adult)       Row Name 07/24/25 0013          Triage Assessment    Airway WDL WDL        Respiratory WDL    Respiratory WDL WDL        Cardiac WDL    Cardiac WDL WDL        Peripheral/Neurovascular WDL    Peripheral Neurovascular WDL WDL        Cognitive/Neuro/Behavioral WDL    Cognitive/Neuro/Behavioral WDL WDL           " RT EQUIPMENT DEVICE RELATED - SKIN ASSESSMENT    Hayes Score:  Hayes Score: 14     RT Medical Equipment/Device:     NIV Mask:  Under-the-nose   size:  B    Skin Assessment:      Cheek:  Intact  Nares:  Intact  Lips:  Intact  Mouth:  Intact    Device Skin Pressure Protection:  Skin-to-device areas padded:  None Required    Nurse Notification:  Mellisa Knight, RRT

## 2025-07-24 NOTE — DISCHARGE INSTRUCTIONS
Low-fat diet recommended.    Zofran if needed for nausea.    Oxycodone if needed for severe pain.  No driving while on this medication.  It can cause constipation so take stool softeners if needed.    Referral sent for follow-up with general surgery.    If you have uncontrolled pain, vomiting medications, fevers, any other worsening symptoms or concerns return at any time.    I hope that this gets resolved quickly and I also hope you have a wonderful weekend!!

## 2025-07-24 NOTE — MEDICATION SCRIBE - ADMISSION MEDICATION HISTORY
Medication Scribe Admission Medication History    Admission medication history is complete. The information provided in this note is only as accurate as the sources available at the time of the update.    Information Source(s): Patient and [email or web address]/[employer]Scripts via in-person    Pertinent Information: Verified no use of pain pump, inhalers or prescription eye drops currently.      Changes made to PTA medication list:  Added: aspirin  Deleted: diprosone ointment, lopressor, zoloft  Changed: omeprazole to prn, pravastatin to bedtime    Allergies reviewed with patient and updates made in EHR: yes    Medication History Completed By: JED AGUILAR 7/24/2025 2:26 PM    PTA Med List   Medication Sig Last Dose/Taking    aspirin (ASA) 325 MG EC tablet Take 650 mg by mouth daily as needed for pain. 7/24/2025 at 11:00 AM    omeprazole (PRILOSEC) 20 MG DR capsule Take 20 mg by mouth daily as needed (heartburn). Past Week    pravastatin (PRAVACHOL) 40 MG tablet Take 40 mg by mouth at bedtime. 7/23/2025 at  9:00 PM

## 2025-07-31 ENCOUNTER — APPOINTMENT (OUTPATIENT)
Dept: CT IMAGING | Facility: OTHER | Age: 80
End: 2025-07-31
Attending: PHYSICIAN ASSISTANT
Payer: COMMERCIAL

## 2025-07-31 ENCOUNTER — HOSPITAL ENCOUNTER (EMERGENCY)
Facility: OTHER | Age: 80
Discharge: HOME | End: 2025-07-31
Attending: PHYSICIAN ASSISTANT
Payer: COMMERCIAL

## 2025-07-31 VITALS
TEMPERATURE: 98.2 F | OXYGEN SATURATION: 97 % | DIASTOLIC BLOOD PRESSURE: 76 MMHG | RESPIRATION RATE: 24 BRPM | HEART RATE: 71 BPM | SYSTOLIC BLOOD PRESSURE: 154 MMHG

## 2025-07-31 DIAGNOSIS — R09.89 PULMONARY AIR TRAPPING: ICD-10-CM

## 2025-07-31 DIAGNOSIS — R06.02 SHORTNESS OF BREATH: Primary | ICD-10-CM

## 2025-07-31 LAB
ALBUMIN SERPL BCG-MCNC: 3.5 G/DL (ref 3.5–5.2)
ALBUMIN UR-MCNC: NEGATIVE MG/DL
ALP SERPL-CCNC: 57 U/L (ref 40–150)
ALT SERPL W P-5'-P-CCNC: 23 U/L (ref 0–70)
ANION GAP SERPL CALCULATED.3IONS-SCNC: 12 MMOL/L (ref 7–15)
APPEARANCE UR: CLEAR
AST SERPL W P-5'-P-CCNC: 30 U/L (ref 0–45)
ATRIAL RATE - MUSE: 71 BPM
BASE EXCESS BLDV CALC-SCNC: 0.7 MMOL/L (ref -3–3)
BASOPHILS # BLD AUTO: 0 10E3/UL (ref 0–0.2)
BASOPHILS NFR BLD AUTO: 1 %
BILIRUB SERPL-MCNC: 0.4 MG/DL
BILIRUB UR QL STRIP: NEGATIVE
BUN SERPL-MCNC: 19.4 MG/DL (ref 8–23)
CALCIUM SERPL-MCNC: 8.7 MG/DL (ref 8.8–10.4)
CHLORIDE SERPL-SCNC: 102 MMOL/L (ref 98–107)
COLOR UR AUTO: ABNORMAL
CREAT SERPL-MCNC: 0.81 MG/DL (ref 0.67–1.17)
CRP SERPL-MCNC: 24.2 MG/L
D DIMER PPP FEU-MCNC: 2.22 UG/ML FEU (ref 0–0.5)
DIASTOLIC BLOOD PRESSURE - MUSE: NORMAL MMHG
EGFRCR SERPLBLD CKD-EPI 2021: 89 ML/MIN/1.73M2
EOSINOPHIL # BLD AUTO: 0.1 10E3/UL (ref 0–0.7)
EOSINOPHIL NFR BLD AUTO: 1 %
ERYTHROCYTE [DISTWIDTH] IN BLOOD BY AUTOMATED COUNT: 13.2 % (ref 10–15)
FLUAV RNA SPEC QL NAA+PROBE: NEGATIVE
FLUBV RNA RESP QL NAA+PROBE: NEGATIVE
GLUCOSE SERPL-MCNC: 113 MG/DL (ref 70–99)
GLUCOSE UR STRIP-MCNC: NEGATIVE MG/DL
HCO3 BLDV-SCNC: 25 MMOL/L (ref 21–28)
HCO3 SERPL-SCNC: 23 MMOL/L (ref 22–29)
HCT VFR BLD AUTO: 38.1 % (ref 40–53)
HGB BLD-MCNC: 13 G/DL (ref 13.3–17.7)
HGB UR QL STRIP: NEGATIVE
HOLD SPECIMEN: NORMAL
IMM GRANULOCYTES # BLD: 0 10E3/UL
IMM GRANULOCYTES NFR BLD: 0 %
INTERPRETATION ECG - MUSE: NORMAL
KETONES UR STRIP-MCNC: ABNORMAL MG/DL
LACTATE SERPL-SCNC: 0.9 MMOL/L (ref 0.7–2)
LEUKOCYTE ESTERASE UR QL STRIP: NEGATIVE
LYMPHOCYTES # BLD AUTO: 1 10E3/UL (ref 0.8–5.3)
LYMPHOCYTES NFR BLD AUTO: 16 %
MAGNESIUM SERPL-MCNC: 1.9 MG/DL (ref 1.7–2.3)
MCH RBC QN AUTO: 32.1 PG (ref 26.5–33)
MCHC RBC AUTO-ENTMCNC: 34.1 G/DL (ref 31.5–36.5)
MCV RBC AUTO: 94 FL (ref 78–100)
MONOCYTES # BLD AUTO: 0.6 10E3/UL (ref 0–1.3)
MONOCYTES NFR BLD AUTO: 9 %
MUCOUS THREADS #/AREA URNS LPF: PRESENT /LPF
NEUTROPHILS # BLD AUTO: 4.5 10E3/UL (ref 1.6–8.3)
NEUTROPHILS NFR BLD AUTO: 73 %
NITRATE UR QL: NEGATIVE
NRBC # BLD AUTO: 0 10E3/UL
NRBC BLD AUTO-RTO: 0 /100
NT-PROBNP SERPL-MCNC: 120 PG/ML (ref 0–852)
O2/TOTAL GAS SETTING VFR VENT: 21 %
OXYHGB MFR BLDV: 84 % (ref 70–75)
P AXIS - MUSE: 63 DEGREES
PCO2 BLDV: 37 MM HG (ref 40–50)
PH BLDV: 7.44 [PH] (ref 7.32–7.43)
PH UR STRIP: 6 [PH] (ref 5–9)
PLATELET # BLD AUTO: 220 10E3/UL (ref 150–450)
PO2 BLDV: 56 MM HG (ref 25–47)
POTASSIUM SERPL-SCNC: 3.7 MMOL/L (ref 3.4–5.3)
PR INTERVAL - MUSE: 176 MS
PROT SERPL-MCNC: 6.8 G/DL (ref 6.4–8.3)
QRS DURATION - MUSE: 102 MS
QT - MUSE: 422 MS
QTC - MUSE: 458 MS
R AXIS - MUSE: -26 DEGREES
RBC # BLD AUTO: 4.05 10E6/UL (ref 4.4–5.9)
RBC URINE: <1 /HPF
RSV RNA SPEC NAA+PROBE: NEGATIVE
SAO2 % BLDV: 84.6 % (ref 70–75)
SARS-COV-2 RNA RESP QL NAA+PROBE: NEGATIVE
SODIUM SERPL-SCNC: 137 MMOL/L (ref 135–145)
SP GR UR STRIP: 1.02 (ref 1–1.03)
SYSTOLIC BLOOD PRESSURE - MUSE: NORMAL MMHG
T AXIS - MUSE: 41 DEGREES
TROPONIN T SERPL HS-MCNC: 12 NG/L
TROPONIN T SERPL HS-MCNC: 12 NG/L
TSH SERPL DL<=0.005 MIU/L-ACNC: 1.25 UIU/ML (ref 0.3–4.2)
UROBILINOGEN UR STRIP-MCNC: NORMAL MG/DL
VENTRICULAR RATE- MUSE: 71 BPM
WBC # BLD AUTO: 6.3 10E3/UL (ref 4–11)
WBC URINE: 1 /HPF

## 2025-07-31 PROCEDURE — 84443 ASSAY THYROID STIM HORMONE: CPT | Performed by: PHYSICIAN ASSISTANT

## 2025-07-31 PROCEDURE — 81001 URINALYSIS AUTO W/SCOPE: CPT | Performed by: PHYSICIAN ASSISTANT

## 2025-07-31 PROCEDURE — 250N000011 HC RX IP 250 OP 636: Performed by: PHYSICIAN ASSISTANT

## 2025-07-31 PROCEDURE — 84484 ASSAY OF TROPONIN QUANT: CPT | Performed by: PHYSICIAN ASSISTANT

## 2025-07-31 PROCEDURE — 82247 BILIRUBIN TOTAL: CPT | Performed by: PHYSICIAN ASSISTANT

## 2025-07-31 PROCEDURE — 250N000011 HC RX IP 250 OP 636: Mod: JZ | Performed by: PHYSICIAN ASSISTANT

## 2025-07-31 PROCEDURE — 85379 FIBRIN DEGRADATION QUANT: CPT | Performed by: PHYSICIAN ASSISTANT

## 2025-07-31 PROCEDURE — 85049 AUTOMATED PLATELET COUNT: CPT | Performed by: PHYSICIAN ASSISTANT

## 2025-07-31 PROCEDURE — 36415 COLL VENOUS BLD VENIPUNCTURE: CPT | Performed by: PHYSICIAN ASSISTANT

## 2025-07-31 PROCEDURE — 94640 AIRWAY INHALATION TREATMENT: CPT

## 2025-07-31 PROCEDURE — 83735 ASSAY OF MAGNESIUM: CPT | Performed by: PHYSICIAN ASSISTANT

## 2025-07-31 PROCEDURE — 250N000009 HC RX 250: Performed by: PHYSICIAN ASSISTANT

## 2025-07-31 PROCEDURE — 85014 HEMATOCRIT: CPT | Performed by: PHYSICIAN ASSISTANT

## 2025-07-31 PROCEDURE — 71275 CT ANGIOGRAPHY CHEST: CPT | Mod: 26 | Performed by: RADIOLOGY

## 2025-07-31 PROCEDURE — 86140 C-REACTIVE PROTEIN: CPT | Performed by: PHYSICIAN ASSISTANT

## 2025-07-31 PROCEDURE — 83880 ASSAY OF NATRIURETIC PEPTIDE: CPT | Performed by: PHYSICIAN ASSISTANT

## 2025-07-31 PROCEDURE — 82374 ASSAY BLOOD CARBON DIOXIDE: CPT | Performed by: PHYSICIAN ASSISTANT

## 2025-07-31 PROCEDURE — 99285 EMERGENCY DEPT VISIT HI MDM: CPT | Mod: 25 | Performed by: PHYSICIAN ASSISTANT

## 2025-07-31 PROCEDURE — 999N000157 HC STATISTIC RCP TIME EA 10 MIN

## 2025-07-31 PROCEDURE — 93010 ELECTROCARDIOGRAM REPORT: CPT | Performed by: INTERNAL MEDICINE

## 2025-07-31 PROCEDURE — 71275 CT ANGIOGRAPHY CHEST: CPT

## 2025-07-31 PROCEDURE — 96374 THER/PROPH/DIAG INJ IV PUSH: CPT | Mod: XU

## 2025-07-31 PROCEDURE — 87637 SARSCOV2&INF A&B&RSV AMP PRB: CPT | Performed by: PHYSICIAN ASSISTANT

## 2025-07-31 PROCEDURE — 82805 BLOOD GASES W/O2 SATURATION: CPT | Performed by: PHYSICIAN ASSISTANT

## 2025-07-31 PROCEDURE — 83605 ASSAY OF LACTIC ACID: CPT | Performed by: PHYSICIAN ASSISTANT

## 2025-07-31 PROCEDURE — 93005 ELECTROCARDIOGRAM TRACING: CPT

## 2025-07-31 PROCEDURE — 99284 EMERGENCY DEPT VISIT MOD MDM: CPT | Performed by: PHYSICIAN ASSISTANT

## 2025-07-31 RX ORDER — PREDNISONE 20 MG/1
40 TABLET ORAL DAILY
Qty: 10 TABLET | Refills: 0 | Status: SHIPPED | OUTPATIENT
Start: 2025-07-31

## 2025-07-31 RX ORDER — IOPAMIDOL 755 MG/ML
79 INJECTION, SOLUTION INTRAVASCULAR ONCE
Status: COMPLETED | OUTPATIENT
Start: 2025-07-31 | End: 2025-07-31

## 2025-07-31 RX ORDER — ALBUTEROL SULFATE 90 UG/1
1-2 INHALANT RESPIRATORY (INHALATION) EVERY 4 HOURS PRN
Qty: 18 G | Refills: 3 | Status: SHIPPED | OUTPATIENT
Start: 2025-07-31

## 2025-07-31 RX ORDER — BUDESONIDE 0.5 MG/2ML
0.5 INHALANT ORAL ONCE
Status: COMPLETED | OUTPATIENT
Start: 2025-07-31 | End: 2025-07-31

## 2025-07-31 RX ORDER — IPRATROPIUM BROMIDE AND ALBUTEROL SULFATE 2.5; .5 MG/3ML; MG/3ML
3 SOLUTION RESPIRATORY (INHALATION) ONCE
Status: COMPLETED | OUTPATIENT
Start: 2025-07-31 | End: 2025-07-31

## 2025-07-31 RX ORDER — METHYLPREDNISOLONE SODIUM SUCCINATE 125 MG/2ML
125 INJECTION INTRAMUSCULAR; INTRAVENOUS ONCE
Status: COMPLETED | OUTPATIENT
Start: 2025-07-31 | End: 2025-07-31

## 2025-07-31 RX ADMIN — BUDESONIDE 0.5 MG: 0.5 INHALANT RESPIRATORY (INHALATION) at 15:20

## 2025-07-31 RX ADMIN — METHYLPREDNISOLONE SODIUM SUCCINATE 125 MG: 125 INJECTION, POWDER, FOR SOLUTION INTRAMUSCULAR; INTRAVENOUS at 15:13

## 2025-07-31 RX ADMIN — IOPAMIDOL 79 ML: 755 INJECTION, SOLUTION INTRAVENOUS at 13:43

## 2025-07-31 RX ADMIN — SODIUM CHLORIDE 80 ML: 9 INJECTION, SOLUTION INTRAVENOUS at 13:44

## 2025-07-31 RX ADMIN — IPRATROPIUM BROMIDE AND ALBUTEROL SULFATE 3 ML: .5; 3 SOLUTION RESPIRATORY (INHALATION) at 15:20

## 2025-07-31 ASSESSMENT — ACTIVITIES OF DAILY LIVING (ADL)
ADLS_ACUITY_SCORE: 41

## 2025-07-31 ASSESSMENT — COLUMBIA-SUICIDE SEVERITY RATING SCALE - C-SSRS
1. IN THE PAST MONTH, HAVE YOU WISHED YOU WERE DEAD OR WISHED YOU COULD GO TO SLEEP AND NOT WAKE UP?: NO
2. HAVE YOU ACTUALLY HAD ANY THOUGHTS OF KILLING YOURSELF IN THE PAST MONTH?: NO
6. HAVE YOU EVER DONE ANYTHING, STARTED TO DO ANYTHING, OR PREPARED TO DO ANYTHING TO END YOUR LIFE?: NO

## 2025-07-31 NOTE — DISCHARGE INSTRUCTIONS
- As of heart attack, blood clot, or pneumonia today's workup.  There was some air trapping concerning for COPD.  Your breathing was improved with use of inhalers and steroids today, also concerning for COPD.  -Recommend close follow-up with your primary care doctor for further workup for potential COPD, which will probably include a pulmonary function test.  Your primary care provider may also have you get an echocardiogram, an ultrasound of your heart, to make sure that there is no heart failure, although your heart labs today were completely normal.  -For home, I will prescribe prednisone 40 mg daily for 5 days starting tomorrow morning.  Please use albuterol inhaler 1 to 2 puffs every 4-6 hours for any cough or shortness of breath episodes.  -Return to the ER for any new or worsening symptoms.

## 2025-07-31 NOTE — ED TRIAGE NOTES
"Pt arrives via private car with complaints of shortness of breath and abnormal imaging. Pt states last week he had a \"gallbladder attack\", when he went in they did a few different studies and discharged him. This morning his doctor in [place] called him and stated they found something wrong with his heart and wanted him to come in to be evaluated. Pts only symptom at this time is intermittent shortness of breath.     Triage Assessment (Adult)       Row Name 07/31/25 1052          Triage Assessment    Airway WDL WDL        Respiratory WDL    Respiratory WDL X;rhythm/pattern     Rhythm/Pattern, Respiratory shortness of breath;dyspnea upon exertion        Skin Circulation/Temperature WDL    Skin Circulation/Temperature WDL WDL        Cardiac WDL    Cardiac WDL WDL        Peripheral/Neurovascular WDL    Peripheral Neurovascular WDL WDL        Cognitive/Neuro/Behavioral WDL    Cognitive/Neuro/Behavioral WDL WDL           "

## 2025-07-31 NOTE — ED PROVIDER NOTES
EMERGENCY DEPARTMENT ENCOUNTER      NAME: Lauro Pritchard  AGE: 80 year old male  YOB: 1945  MRN: [id number]  EVALUATION DATE & TIME: 7/31/2025 10:55 AM    PCP: Jarrett Ibarra    ED PROVIDER: Rafael Law PA-C       CHIEF COMPLAINT:  Chief Complaint   Patient presents with    Shortness of Breath    Abnormal Imaging         HPI  Lauro Pritchard is a pleasant 80 year old male with history of prediabetes, hyperlipidemia and chronic cholecystitis presents to the ER for shortness of breath.  Patient had been previously worked up for chronic cholecystitis which he supposed to see general surgery on Monday.  Patient was encouraged to come to the ER for potential enlarged heart to rule out heart failure.  Patient states he does usually have a phlegmy cough.  Patient denying any chest pain today.  Shortness of breath and some occasional lightheadedness upon bending over and then standing up.  Patient denies any leg swelling or weight gain.  Patient is a former smoker but has never been diagnosed with COPD.      REVIEW OF SYSTEMS   Review of Systems  As above, otherwise ROS is unremarkable.      PAST MEDICAL HISTORY:  Past Medical History:   Diagnosis Date    Anxiety disorder     lifetime    Arthritis neck and knee    History of adenomatous polyp of colon     tubular adenoma x1, next scope 2028         PAST SURGICAL HISTORY:  Past Surgical History:   Procedure Laterality Date    ARTHROSCOPY KNEE      2015    COLONOSCOPY      2010,follow up due 2015 -- s/p polyp removal --> next in 2018    COLONOSCOPY  09/10/2015    9/10/2015,[address]93.0,[place] COLONOSCOPY REMOVE JULIO POLYP LESN SNARE    COLONOSCOPY  09/17/2018    splenic flexure polyp    COLONOSCOPY N/A 09/17/2018    1 small tubular adenoma follow up 2028    SHOULDER SURGERY Right 2016    SOFT TISSUE SURGERY           CURRENT MEDICATIONS:    Current Outpatient Medications   Medication Instructions    aspirin (ASA) 650 mg, DAILY PRN    omeprazole (PRILOSEC) 20 mg,  DAILY PRN    pravastatin (PRAVACHOL) 40 mg, AT BEDTIME         ALLERGIES:  No Known Allergies      FAMILY HISTORY:  Family History   Problem Relation Age of Onset    Osteoporosis Mother         Osteoporosis    Genetic Disorder Other         Genetic,Mother - Diabetes.  No FHx of CVA, No early family HX of CAD, no known cancers         SOCIAL HISTORY:   Social History     Socioeconomic History    Marital status:      Spouse name: Pamela   Tobacco Use    Smoking status: Former     Current packs/day: 0.00     Average packs/day: 0.5 packs/day for 10.0 years (5.0 ttl pk-yrs)     Types: Cigarettes     Start date: 7/10/1975     Quit date: 7/10/1985     Years since quittin.0     Passive exposure: Past    Smokeless tobacco: Former   Vaping Use    Vaping status: Never Used   Substance and Sexual Activity    Alcohol use: Not Currently     Alcohol/week: 11.7 standard drinks of alcohol    Drug use: Never    Sexual activity: Yes     Partners: Female   Other Topics Concern    Parent/sibling w/ CABG, MI or angioplasty before 65F 55M? No   Social History Narrative    , lives with wife, retired .     Social Drivers of Health     Financial Resource Strain: Low Risk  (2025)    Financial Resource Strain     Within the past 12 months, have you or your family members you live with been unable to get utilities (heat, electricity) when it was really needed?: No   Food Insecurity: Low Risk  (2025)    Food Insecurity     Within the past 12 months, did you worry that your food would run out before you got money to buy more?: No     Within the past 12 months, did the food you bought just not last and you didn t have money to get more?: No   Transportation Needs: Low Risk  (2025)    Transportation Needs     Within the past 12 months, has lack of transportation kept you from medical appointments, getting your medicines, non-medical meetings or appointments, work, or from getting things that you need?: No    Physical Activity: Sufficiently Active (4/25/2025)    Exercise Vital Sign     Days of Exercise per Week: 6 days     Minutes of Exercise per Session: 90 min   Stress: No Stress Concern Present (4/25/2025)    [employer] - Occupational Stress Questionnaire     Feeling of Stress : Only a little   Social Connections: Unknown (4/25/2025)    Social Connection and Isolation Panel [NHANES]     Frequency of Social Gatherings with Friends and Family: Once a week   Interpersonal Safety: Low Risk  (4/28/2025)    Interpersonal Safety     Do you feel physically and emotionally safe where you currently live?: Yes     Within the past 12 months, have you been hit, slapped, kicked or otherwise physically hurt by someone?: No     Within the past 12 months, have you been humiliated or emotionally abused in other ways by your partner or ex-partner?: No   Housing Stability: Low Risk  (4/25/2025)    Housing Stability     Do you have housing? : Yes     Are you worried about losing your housing?: No       ==================================================================================================================================    PHYSICAL EXAM    VITAL SIGNS: BP (!) 154/76   Pulse 71   Temp 98.2  F (36.8  C) (Tympanic)   Resp 24   SpO2 97%     Patient Vitals for the past 24 hrs:   BP Temp Temp src Pulse Resp SpO2   07/31/25 1520 -- -- -- -- -- 97 %   07/31/25 1052 (!) 154/76 98.2  F (36.8  C) Tympanic 71 24 97 %       Physical Exam  Vitals and nursing note reviewed.   Constitutional:       Appearance: He is well-developed.   HENT:      Right Ear: External ear normal.      Left Ear: External ear normal.      Mouth/Throat:      Mouth: Mucous membranes are moist.   Eyes:      Conjunctiva/sclera: Conjunctivae normal.   Cardiovascular:      Rate and Rhythm: Normal rate and regular rhythm.      Pulses: Normal pulses.      Heart sounds: Normal heart sounds.   Pulmonary:      Effort: Pulmonary effort is  normal.      Breath sounds: Normal breath sounds.   Abdominal:      General: Abdomen is flat.      Palpations: Abdomen is soft.      Tenderness: There is abdominal tenderness (Minimal right upper quadrant tenderness with no rebound or guarding.  Patient states that this is unchanged.).   Musculoskeletal:         General: Normal range of motion.      Cervical back: Normal range of motion.      Right lower leg: No edema.      Left lower leg: No edema.   Skin:     General: Skin is warm and dry.   Neurological:      General: No focal deficit present.      Mental Status: He is alert.   Psychiatric:         Mood and Affect: Mood normal.            ==================================================================================================================================    LABS & RADIOLOGY:  All pertinent labs reviewed and interpreted. Reviewed all pertinent imaging. Please see official radiology report.  Results for orders placed or performed during the hospital encounter of 07/31/25   CT Chest Pulmonary Embolism w Contrast    Impression    IMPRESSION: No pulmonary emboli.    Some mosaic attenuation and air trapping is seen in the lower lobes.    Calcific pleural plaquing seen in both hemithoraces.    ISAK VASQUES MD         SYSTEM ID:  [id number]   D dimer quantitative   Result Value Ref Range    D-Dimer Quantitative 2.22 (H) 0.00 - 0.50 ug/mL FEU   Comprehensive Metabolic Panel (Limited Occurrences)   Result Value Ref Range    Sodium 137 135 - 145 mmol/L    Potassium 3.7 3.4 - 5.3 mmol/L    Carbon Dioxide (CO2) 23 22 - 29 mmol/L    Anion Gap 12 7 - 15 mmol/L    Urea Nitrogen 19.4 8.0 - 23.0 mg/dL    Creatinine 0.81 0.67 - 1.17 mg/dL    GFR Estimate 89 >60 mL/min/1.73m2    Calcium 8.7 (L) 8.8 - 10.4 mg/dL    Chloride 102 98 - 107 mmol/L    Glucose 113 (H) 70 - 99 mg/dL    Alkaline Phosphatase 57 40 - 150 U/L    AST 30 0 - 45 U/L    ALT 23 0 - 70 U/L    Protein Total 6.8 6.4 - 8.3 g/dL    Albumin 3.5 3.5 - 5.2  g/dL    Bilirubin Total 0.4 <=1.2 mg/dL   Lactic acid whole blood with 1x repeat in 2 hr when >2   Result Value Ref Range    Lactic Acid, Initial 0.9 0.7 - 2.0 mmol/L   Result Value Ref Range    Troponin T, High Sensitivity 12 <=22 ng/L   Magnesium (Limited Occurrences)   Result Value Ref Range    Magnesium 1.9 1.7 - 2.3 mg/dL   TSH Reflex GH   Result Value Ref Range    TSH 1.25 0.30 - 4.20 uIU/mL   Blood gas venous   Result Value Ref Range    pH Venous 7.44 (H) 7.32 - 7.43    pCO2 Venous 37 (L) 40 - 50 mm Hg    pO2 Venous 56 (H) 25 - 47 mm Hg    Bicarbonate Venous 25 21 - 28 mmol/L    Base Excess/Deficit Venous 0.7 -3.0 - 3.0 mmol/L    FIO2 21     Oxyhemoglobin Venous 84 (H) 70 - 75 %    O2 Sat, Venous 84.6 (H) 70.0 - 75.0 %   NT-proBNP   Result Value Ref Range    NT-proBNP 120 0 - 852 pg/mL   Result Value Ref Range    CRP Inflammation 24.20 (H) <5.00 mg/L   Influenza A/B, RSV and SARS-CoV2 PCR (COVID-19) Nose    Specimen: Nose; Swab   Result Value Ref Range    Influenza A PCR Negative Negative    Influenza B PCR Negative Negative    RSV PCR Negative Negative    SARS CoV2 PCR Negative Negative   UA with Microscopic reflex to Culture    Specimen: Urine, Midstream   Result Value Ref Range    Color Urine Light Yellow Colorless, Straw, Light Yellow, Yellow    Appearance Urine Clear Clear    Glucose Urine Negative Negative mg/dL    Bilirubin Urine Negative Negative    Ketones Urine Trace (A) Negative mg/dL    Specific Gravity Urine 1.020 1.000 - 1.030    Blood Urine Negative Negative    pH Urine 6.0 5.0 - 9.0    Protein Albumin Urine Negative Negative mg/dL    Urobilinogen Urine Normal Normal mg/dL    Nitrite Urine Negative Negative    Leukocyte Esterase Urine Negative Negative    Mucus Urine Present (A) None Seen /LPF    RBC Urine <1 <=2 /HPF    WBC Urine 1 <=5 /HPF   CBC with platelets and differential   Result Value Ref Range    WBC Count 6.3 4.0 - 11.0 10e3/uL    RBC Count 4.05 (L) 4.40 - 5.90 10e6/uL    Hemoglobin  13.0 (L) 13.3 - 17.7 g/dL    Hematocrit 38.1 (L) 40.0 - 53.0 %    MCV 94 78 - 100 fL    MCH 32.1 26.5 - 33.0 pg    MCHC 34.1 31.5 - 36.5 g/dL    RDW 13.2 10.0 - 15.0 %    Platelet Count 220 150 - 450 10e3/uL    % Neutrophils 73 %    % Lymphocytes 16 %    % Monocytes 9 %    % Eosinophils 1 %    % Basophils 1 %    % Immature Granulocytes 0 %    NRBCs per 100 WBC 0 <1 /100    Absolute Neutrophils 4.5 1.6 - 8.3 10e3/uL    Absolute Lymphocytes 1.0 0.8 - 5.3 10e3/uL    Absolute Monocytes 0.6 0.0 - 1.3 10e3/uL    Absolute Eosinophils 0.1 0.0 - 0.7 10e3/uL    Absolute Basophils 0.0 0.0 - 0.2 10e3/uL    Absolute Immature Granulocytes 0.0 <=0.4 10e3/uL    Absolute NRBCs 0.0 10e3/uL   Extra Red Top Tube   Result Value Ref Range    Hold Specimen JIC    Result Value Ref Range    Troponin T, High Sensitivity 12 <=22 ng/L     CT Chest Pulmonary Embolism w Contrast   Final Result   IMPRESSION: No pulmonary emboli.      Some mosaic attenuation and air trapping is seen in the lower lobes.      Calcific pleural plaquing seen in both hemithoraces.      ISAK VASQUES MD            SYSTEM ID:  [id number]            EKG:    EKG reviewed at 1110.  1) Rhythm: NSR  2) Rate: ventricular rate 71 bpm.  3) QRS Axis: No axis deviation  4) P waves/ MD interval: Sinus. Nml BAILEY. No atrial enlargement  5) QRS complex: Narrow. No BBB.  Possible left ventricular hypertrophy. No pathological Q waves.  6) ST Segment: No acute ST segment elevation or depression  7) T waves: No T wave inversions. No peaked or flattened T waves.     I have independently reviewed and interpreted today's EKG, pending cardiologist over read.       ==================================================================================================================================    ED COURSE, MEDICAL DECISION MAKING, FINAL IMPRESSION AND PLAN:     Assessment / Plan:  1. Shortness of breath    2. Pulmonary air trapping        The patient was interviewed and examined.  HPI and  physical exam as below.  Differential diagnosis and MDM Key Documentation Elements as below.  Vitals, triage note, and nursing notes were reviewed.  BP (!) 154/76   Pulse 71   Temp 98.2  F (36.8  C) (Tympanic)   Resp 24   SpO2 97%     Patient was afebrile, blood pressure.  Patient was in no acute distress.  Evaluation today revealed lungs are clear.  No chest wall tenderness.  Heart unremarkable.  Minimal right upper quadrant abdominal tenderness which patient states is chronic unchanged.  No lower extremity edema or tenderness.  Patient denies any weight gain.    Patient with normal white blood cell count.  Hemoglobin 13.0.  Glucose 113.  Venous pH 7.44 with CO2 37, O2 56, HCO3 25.  Elevated D-dimer 2.22.  Elevated CRP 24.2.  Remaining laboratory studies were otherwise unremarkable for acute findings.  EKG was normal-appearing with no ST segment deviation to suggest ACS/MI.  Screening troponin and BNP were not elevated, less likely ACS/MI or acute heart failure.    Given elevated D-dimer, CT chest PE study was conducted.  Patient had mosaic attenuation and air trapping seen in the lower lobes bilaterally but no signs of PE, consolidation, or effusion.  Patient with pleural plaquing seen.  Patient denies any history of COPD.    Patient was given a DuoNeb along with Pulmicort nebulizer solution with IV Solu-Medrol 125 mg.  Patient states his cough is improved and shortness of breath has improved as well.    I suspect that patient's underlying shortness of breath may be related to undiagnosed COPD.  Would recommend patient follow-up with his primary care doctor or pulmonologist for further evaluation of potential COPD.  Patient will be given prednisone 40 mg daily for 5 days and albuterol inhaler for home.  Do not suspect any worsening of patient's cholecystitis requiring emergent surgery.  No signs of ACS/MI or acute heart failure today.  If not current, would recommend updated echocardiogram through his primary  care provider or cardiologist.  Patient was discharged home in stable and improved condition.    Pertinent Labs & Imaging studies reviewed. (See chart for details)  Results for orders placed or performed during the hospital encounter of 07/31/25   CT Chest Pulmonary Embolism w Contrast    Impression    IMPRESSION: No pulmonary emboli.    Some mosaic attenuation and air trapping is seen in the lower lobes.    Calcific pleural plaquing seen in both hemithoraces.    ISAK VASQUES MD         SYSTEM ID:  [id number]   D dimer quantitative   Result Value Ref Range    D-Dimer Quantitative 2.22 (H) 0.00 - 0.50 ug/mL FEU   Comprehensive Metabolic Panel (Limited Occurrences)   Result Value Ref Range    Sodium 137 135 - 145 mmol/L    Potassium 3.7 3.4 - 5.3 mmol/L    Carbon Dioxide (CO2) 23 22 - 29 mmol/L    Anion Gap 12 7 - 15 mmol/L    Urea Nitrogen 19.4 8.0 - 23.0 mg/dL    Creatinine 0.81 0.67 - 1.17 mg/dL    GFR Estimate 89 >60 mL/min/1.73m2    Calcium 8.7 (L) 8.8 - 10.4 mg/dL    Chloride 102 98 - 107 mmol/L    Glucose 113 (H) 70 - 99 mg/dL    Alkaline Phosphatase 57 40 - 150 U/L    AST 30 0 - 45 U/L    ALT 23 0 - 70 U/L    Protein Total 6.8 6.4 - 8.3 g/dL    Albumin 3.5 3.5 - 5.2 g/dL    Bilirubin Total 0.4 <=1.2 mg/dL   Lactic acid whole blood with 1x repeat in 2 hr when >2   Result Value Ref Range    Lactic Acid, Initial 0.9 0.7 - 2.0 mmol/L   Result Value Ref Range    Troponin T, High Sensitivity 12 <=22 ng/L   Magnesium (Limited Occurrences)   Result Value Ref Range    Magnesium 1.9 1.7 - 2.3 mg/dL   TSH Reflex GH   Result Value Ref Range    TSH 1.25 0.30 - 4.20 uIU/mL   Blood gas venous   Result Value Ref Range    pH Venous 7.44 (H) 7.32 - 7.43    pCO2 Venous 37 (L) 40 - 50 mm Hg    pO2 Venous 56 (H) 25 - 47 mm Hg    Bicarbonate Venous 25 21 - 28 mmol/L    Base Excess/Deficit Venous 0.7 -3.0 - 3.0 mmol/L    FIO2 21     Oxyhemoglobin Venous 84 (H) 70 - 75 %    O2 Sat, Venous 84.6 (H) 70.0 - 75.0 %   NT-proBNP   Result  Value Ref Range    NT-proBNP 120 0 - 852 pg/mL   Result Value Ref Range    CRP Inflammation 24.20 (H) <5.00 mg/L   Influenza A/B, RSV and SARS-CoV2 PCR (COVID-19) Nose    Specimen: Nose; Swab   Result Value Ref Range    Influenza A PCR Negative Negative    Influenza B PCR Negative Negative    RSV PCR Negative Negative    SARS CoV2 PCR Negative Negative   UA with Microscopic reflex to Culture    Specimen: Urine, Midstream   Result Value Ref Range    Color Urine Light Yellow Colorless, Straw, Light Yellow, Yellow    Appearance Urine Clear Clear    Glucose Urine Negative Negative mg/dL    Bilirubin Urine Negative Negative    Ketones Urine Trace (A) Negative mg/dL    Specific Gravity Urine 1.020 1.000 - 1.030    Blood Urine Negative Negative    pH Urine 6.0 5.0 - 9.0    Protein Albumin Urine Negative Negative mg/dL    Urobilinogen Urine Normal Normal mg/dL    Nitrite Urine Negative Negative    Leukocyte Esterase Urine Negative Negative    Mucus Urine Present (A) None Seen /LPF    RBC Urine <1 <=2 /HPF    WBC Urine 1 <=5 /HPF   CBC with platelets and differential   Result Value Ref Range    WBC Count 6.3 4.0 - 11.0 10e3/uL    RBC Count 4.05 (L) 4.40 - 5.90 10e6/uL    Hemoglobin 13.0 (L) 13.3 - 17.7 g/dL    Hematocrit 38.1 (L) 40.0 - 53.0 %    MCV 94 78 - 100 fL    MCH 32.1 26.5 - 33.0 pg    MCHC 34.1 31.5 - 36.5 g/dL    RDW 13.2 10.0 - 15.0 %    Platelet Count 220 150 - 450 10e3/uL    % Neutrophils 73 %    % Lymphocytes 16 %    % Monocytes 9 %    % Eosinophils 1 %    % Basophils 1 %    % Immature Granulocytes 0 %    NRBCs per 100 WBC 0 <1 /100    Absolute Neutrophils 4.5 1.6 - 8.3 10e3/uL    Absolute Lymphocytes 1.0 0.8 - 5.3 10e3/uL    Absolute Monocytes 0.6 0.0 - 1.3 10e3/uL    Absolute Eosinophils 0.1 0.0 - 0.7 10e3/uL    Absolute Basophils 0.0 0.0 - 0.2 10e3/uL    Absolute Immature Granulocytes 0.0 <=0.4 10e3/uL    Absolute NRBCs 0.0 10e3/uL   Extra Red Top Tube   Result Value Ref Range    Hold Specimen JIC    Result  "Value Ref Range    Troponin T, High Sensitivity 12 <=22 ng/L     No results found for: \"ABORH\"      Reassessments, Medications, Interventions, & Response to Treatments:  -as above    Medications given during today's ER visit:  Medications   iopamidol (ISOVUE-370) solution 79 mL (79 mLs Intravenous $Given 7/31/25 1343)   sodium chloride 0.9 % bag for CT scan flush (80 mLs Intravenous $Given 7/31/25 1344)   methylPREDNISolone Na Suc (solu-MEDROL) injection 125 mg (125 mg Intravenous $Given 7/31/25 1513)   budesonide (PULMICORT) neb solution 0.5 mg (0.5 mg Nebulization $Given 7/31/25 1520)   ipratropium - albuterol 0.5 mg/2.5 mg (3mg)/3 mL (DUONEB) neb solution 3 mL (3 mLs Nebulization $Given 7/31/25 1520)       Consultations:  None    Decision Rules, Medical Calculators, Sepsis Criteria, and Risk Stratification Tools:  None    MDM Key Documentation Elements for Patient's Evaluation:  Differential diagnosis to include high risk considerations: As above  Escalation to admission/observation considered: Admission/observation considered, but patient does not meet admission criteria  Discussions and management with other clinicians:    3a. Independent interpretation of testing performed by another health professional:  -No  3b. Discussion of management or test interpretation with another health professional: -No  Independent interpretation of tests:  Ordering and/or review of 3+ test(s)  Discussion of test interpretations with radiology:  No  History obtained from source other than patient or assessment requiring an independent historian:  No  Review of non-ED/external records:  review of 3+ records  Diagnostic tests considered but not ultimately performed/deferred:  -Echocardiogram  Prescription medications considered but not prescribed:  -Antibiotics, no signs of UTI  Chronic conditions affecting care:  -None  Care affected by social determinants of health:  -None    The patient's management involved:   - Laboratory " studies  - Imaging studies  - Prescription drug management  - Parenteral controlled substance      A shared decision making model was used. Time was taken to answer all questions.  Patient and/or associated parties understood and were agreeable to treatment plan.  Plan and all results were discussed. Warning signs and close return precautions to return to the ED given. Copy of results given. Discharged in stable condition. Discharged with discharge instructions outlining plan for further care and follow up.      New prescriptions started at today's ER visit  New Prescriptions    ALBUTEROL (VENTOLIN HFA) 108 (90 BASE) MCG/ACT INHALER    Inhale 1-2 puffs into the lungs every 4 hours as needed for shortness of breath, wheezing or cough.    PREDNISONE (DELTASONE) 20 MG TABLET    Take 2 tablets (40 mg) by mouth daily.       ==================================================================================================================================      Nishant HERNANDEZ PA-C, personally performed the services described in this documentation, and it is both accurate and complete.       Rafael Law PA-C  07/31/25 [id number]

## 2025-08-04 ENCOUNTER — OFFICE VISIT (OUTPATIENT)
Dept: SURGERY | Facility: CLINIC | Age: 80
End: 2025-08-04
Attending: EMERGENCY MEDICINE
Payer: COMMERCIAL

## 2025-08-04 ENCOUNTER — TELEPHONE (OUTPATIENT)
Dept: SURGERY | Facility: CLINIC | Age: 80
End: 2025-08-04

## 2025-08-04 ENCOUNTER — OFFICE VISIT (OUTPATIENT)
Dept: FAMILY MEDICINE | Facility: OTHER | Age: 80
End: 2025-08-04
Payer: COMMERCIAL

## 2025-08-04 VITALS
HEART RATE: 64 BPM | TEMPERATURE: 98.4 F | SYSTOLIC BLOOD PRESSURE: 132 MMHG | WEIGHT: 180 LBS | RESPIRATION RATE: 20 BRPM | OXYGEN SATURATION: 97 % | BODY MASS INDEX: 25.77 KG/M2 | HEIGHT: 70 IN | DIASTOLIC BLOOD PRESSURE: 82 MMHG

## 2025-08-04 VITALS
WEIGHT: 181.8 LBS | SYSTOLIC BLOOD PRESSURE: 123 MMHG | HEIGHT: 75 IN | BODY MASS INDEX: 22.6 KG/M2 | HEART RATE: 77 BPM | OXYGEN SATURATION: 94 % | DIASTOLIC BLOOD PRESSURE: 72 MMHG | TEMPERATURE: 97.9 F

## 2025-08-04 DIAGNOSIS — K82.8 GALLBLADDER SLUDGE: ICD-10-CM

## 2025-08-04 DIAGNOSIS — K81.1 CHRONIC CHOLECYSTITIS: Primary | ICD-10-CM

## 2025-08-04 DIAGNOSIS — K81.2 ACUTE ON CHRONIC CHOLECYSTITIS: ICD-10-CM

## 2025-08-04 DIAGNOSIS — R06.02 SHORTNESS OF BREATH: ICD-10-CM

## 2025-08-04 DIAGNOSIS — R10.11 RUQ ABDOMINAL PAIN: ICD-10-CM

## 2025-08-04 PROCEDURE — 1126F AMNT PAIN NOTED NONE PRSNT: CPT | Performed by: PHYSICIAN ASSISTANT

## 2025-08-04 PROCEDURE — 99213 OFFICE O/P EST LOW 20 MIN: CPT | Performed by: PHYSICIAN ASSISTANT

## 2025-08-04 PROCEDURE — 3075F SYST BP GE 130 - 139MM HG: CPT | Performed by: PHYSICIAN ASSISTANT

## 2025-08-04 PROCEDURE — 3079F DIAST BP 80-89 MM HG: CPT | Performed by: PHYSICIAN ASSISTANT

## 2025-08-04 PROCEDURE — 3078F DIAST BP <80 MM HG: CPT | Performed by: SPECIALIST

## 2025-08-04 PROCEDURE — 99204 OFFICE O/P NEW MOD 45 MIN: CPT | Performed by: SPECIALIST

## 2025-08-04 PROCEDURE — 1125F AMNT PAIN NOTED PAIN PRSNT: CPT | Performed by: SPECIALIST

## 2025-08-04 PROCEDURE — 3074F SYST BP LT 130 MM HG: CPT | Performed by: SPECIALIST

## 2025-08-04 ASSESSMENT — PAIN SCALES - GENERAL
PAINLEVEL_OUTOF10: MILD PAIN (3)
PAINLEVEL_OUTOF10: NO PAIN (0)

## 2025-08-05 DIAGNOSIS — R09.89 PULMONARY AIR TRAPPING: ICD-10-CM

## 2025-08-05 DIAGNOSIS — R06.02 SOB (SHORTNESS OF BREATH): Primary | ICD-10-CM

## 2025-08-06 ENCOUNTER — TELEPHONE (OUTPATIENT)
Dept: GASTROENTEROLOGY | Facility: CLINIC | Age: 80
End: 2025-08-06
Payer: COMMERCIAL

## (undated) DEVICE — SUCTION MANIFOLD NEPTUNE 2 SYS 4 PORT 0702-020-000

## (undated) DEVICE — ENDO KIT COMPLIANCE DYKENDOCMPLY

## (undated) DEVICE — ENDO FORCEP ENDOJAW BIOPSY 2.8MMX230CM FB-220U

## (undated) DEVICE — ENDO BRUSH CHANNEL MASTER CLEANING 2-4.2MM BW-412T

## (undated) DEVICE — SOL WATER 1500ML

## (undated) DEVICE — TUBING SUCTION 10'X3/16" N510

## (undated) RX ORDER — LIDOCAINE HYDROCHLORIDE 10 MG/ML
INJECTION, SOLUTION INFILTRATION; PERINEURAL
Status: DISPENSED
Start: 2018-09-14

## (undated) RX ORDER — TRIAMCINOLONE ACETONIDE 40 MG/ML
INJECTION, SUSPENSION INTRA-ARTICULAR; INTRAMUSCULAR
Status: DISPENSED
Start: 2018-09-25

## (undated) RX ORDER — IPRATROPIUM BROMIDE AND ALBUTEROL SULFATE 2.5; .5 MG/3ML; MG/3ML
SOLUTION RESPIRATORY (INHALATION)
Status: DISPENSED
Start: 2025-07-31

## (undated) RX ORDER — BUPIVACAINE HYDROCHLORIDE 5 MG/ML
INJECTION, SOLUTION EPIDURAL; INTRACAUDAL
Status: DISPENSED
Start: 2018-09-25

## (undated) RX ORDER — LIDOCAINE HYDROCHLORIDE 20 MG/ML
INJECTION, SOLUTION EPIDURAL; INFILTRATION; INTRACAUDAL; PERINEURAL
Status: DISPENSED
Start: 2018-09-17

## (undated) RX ORDER — BUDESONIDE 0.5 MG/2ML
INHALANT ORAL
Status: DISPENSED
Start: 2025-07-31

## (undated) RX ORDER — BETAMETHASONE SODIUM PHOSPHATE AND BETAMETHASONE ACETATE 3; 3 MG/ML; MG/ML
INJECTION, SUSPENSION INTRA-ARTICULAR; INTRALESIONAL; INTRAMUSCULAR; SOFT TISSUE
Status: DISPENSED
Start: 2018-09-14

## (undated) RX ORDER — BUPIVACAINE HYDROCHLORIDE 5 MG/ML
INJECTION, SOLUTION EPIDURAL; INTRACAUDAL
Status: DISPENSED
Start: 2019-01-04

## (undated) RX ORDER — METHYLPREDNISOLONE SODIUM SUCCINATE 125 MG/2ML
INJECTION INTRAMUSCULAR; INTRAVENOUS
Status: DISPENSED
Start: 2025-07-31

## (undated) RX ORDER — LIDOCAINE HYDROCHLORIDE 10 MG/ML
INJECTION, SOLUTION INFILTRATION; PERINEURAL
Status: DISPENSED
Start: 2018-09-25

## (undated) RX ORDER — PROPOFOL 10 MG/ML
INJECTION, EMULSION INTRAVENOUS
Status: DISPENSED
Start: 2018-09-17

## (undated) RX ORDER — LIDOCAINE HYDROCHLORIDE 10 MG/ML
INJECTION, SOLUTION INFILTRATION; PERINEURAL
Status: DISPENSED
Start: 2019-01-04